# Patient Record
Sex: MALE | Race: WHITE | Employment: OTHER | URBAN - METROPOLITAN AREA
[De-identification: names, ages, dates, MRNs, and addresses within clinical notes are randomized per-mention and may not be internally consistent; named-entity substitution may affect disease eponyms.]

---

## 2017-01-06 ENCOUNTER — HOSPITAL ENCOUNTER (INPATIENT)
Age: 82
LOS: 4 days | Discharge: NURSING, REHAB OR LONG-TERM CARE | DRG: 065 | End: 2017-01-10
Attending: INTERNAL MEDICINE | Admitting: FAMILY MEDICINE
Payer: MEDICARE

## 2017-01-06 ENCOUNTER — TELEPHONE (OUTPATIENT)
Dept: FAMILY MEDICINE CLINIC | Age: 82
End: 2017-01-06

## 2017-01-06 PROCEDURE — 36415 COLL VENOUS BLD VENIPUNCTURE: CPT

## 2017-01-06 PROCEDURE — 2580000003 HC RX 258: Performed by: FAMILY MEDICINE

## 2017-01-06 PROCEDURE — 1210000000 HC MED SURG R&B

## 2017-01-06 PROCEDURE — 84484 ASSAY OF TROPONIN QUANT: CPT

## 2017-01-06 RX ORDER — HYDRALAZINE HYDROCHLORIDE 20 MG/ML
10 INJECTION INTRAMUSCULAR; INTRAVENOUS EVERY 4 HOURS PRN
Status: DISCONTINUED | OUTPATIENT
Start: 2017-01-06 | End: 2017-01-09

## 2017-01-06 RX ORDER — ONDANSETRON 2 MG/ML
4 INJECTION INTRAMUSCULAR; INTRAVENOUS EVERY 6 HOURS PRN
Status: DISCONTINUED | OUTPATIENT
Start: 2017-01-06 | End: 2017-01-10 | Stop reason: HOSPADM

## 2017-01-06 RX ORDER — SODIUM CHLORIDE 9 MG/ML
INJECTION, SOLUTION INTRAVENOUS CONTINUOUS
Status: DISCONTINUED | OUTPATIENT
Start: 2017-01-06 | End: 2017-01-09

## 2017-01-06 RX ORDER — ACETAMINOPHEN 650 MG/1
650 SUPPOSITORY RECTAL EVERY 4 HOURS PRN
Status: DISCONTINUED | OUTPATIENT
Start: 2017-01-06 | End: 2017-01-10 | Stop reason: HOSPADM

## 2017-01-06 RX ORDER — SULFACETAMIDE SODIUM 100 MG/ML
1 SOLUTION/ DROPS OPHTHALMIC
Status: DISCONTINUED | OUTPATIENT
Start: 2017-01-07 | End: 2017-01-10 | Stop reason: HOSPADM

## 2017-01-06 RX ORDER — ACETAMINOPHEN 325 MG/1
650 TABLET ORAL EVERY 4 HOURS PRN
Status: DISCONTINUED | OUTPATIENT
Start: 2017-01-06 | End: 2017-01-10 | Stop reason: HOSPADM

## 2017-01-06 RX ADMIN — SODIUM CHLORIDE: 9 INJECTION, SOLUTION INTRAVENOUS at 23:52

## 2017-01-07 ENCOUNTER — APPOINTMENT (OUTPATIENT)
Dept: ULTRASOUND IMAGING | Age: 82
DRG: 065 | End: 2017-01-07
Attending: INTERNAL MEDICINE
Payer: MEDICARE

## 2017-01-07 ENCOUNTER — APPOINTMENT (OUTPATIENT)
Dept: GENERAL RADIOLOGY | Age: 82
DRG: 065 | End: 2017-01-07
Attending: INTERNAL MEDICINE
Payer: MEDICARE

## 2017-01-07 ENCOUNTER — APPOINTMENT (OUTPATIENT)
Dept: MRI IMAGING | Age: 82
DRG: 065 | End: 2017-01-07
Attending: INTERNAL MEDICINE
Payer: MEDICARE

## 2017-01-07 PROBLEM — I48.91 NEW ONSET A-FIB (HCC): Status: ACTIVE | Noted: 2017-01-07

## 2017-01-07 PROBLEM — I63.9 CVA (CEREBRAL VASCULAR ACCIDENT) (HCC): Status: ACTIVE | Noted: 2017-01-07

## 2017-01-07 LAB
ANION GAP SERPL CALCULATED.3IONS-SCNC: 15 MEQ/L (ref 7–13)
BUN BLDV-MCNC: 24 MG/DL (ref 8–23)
CALCIUM SERPL-MCNC: 8.8 MG/DL (ref 8.6–10.2)
CHLORIDE BLD-SCNC: 102 MEQ/L (ref 98–107)
CO2: 21 MEQ/L (ref 22–29)
CREAT SERPL-MCNC: 0.7 MG/DL (ref 0.7–1.2)
GFR AFRICAN AMERICAN: >60
GFR NON-AFRICAN AMERICAN: >60
GLUCOSE BLD-MCNC: 89 MG/DL (ref 74–109)
HCT VFR BLD CALC: 40.7 % (ref 42–52)
HEMOGLOBIN: 13.4 G/DL (ref 14–18)
LV EF: 60 %
LVEF MODALITY: NORMAL
MCH RBC QN AUTO: 30 PG (ref 27–31.3)
MCHC RBC AUTO-ENTMCNC: 33 % (ref 33–37)
MCV RBC AUTO: 91.1 FL (ref 80–100)
PDW BLD-RTO: 13.8 % (ref 11.5–14.5)
PLATELET # BLD: 254 K/UL (ref 130–400)
POTASSIUM SERPL-SCNC: 3.7 MEQ/L (ref 3.5–5.1)
RBC # BLD: 4.47 M/UL (ref 4.7–6.1)
SODIUM BLD-SCNC: 138 MEQ/L (ref 132–144)
TROPONIN: <0.01 NG/ML (ref 0–0.01)
WBC # BLD: 9.6 K/UL (ref 4.8–10.8)

## 2017-01-07 PROCEDURE — 1210000000 HC MED SURG R&B

## 2017-01-07 PROCEDURE — 6370000000 HC RX 637 (ALT 250 FOR IP): Performed by: FAMILY MEDICINE

## 2017-01-07 PROCEDURE — G8989 SELF CARE D/C STATUS: HCPCS

## 2017-01-07 PROCEDURE — 6360000002 HC RX W HCPCS: Performed by: NURSE PRACTITIONER

## 2017-01-07 PROCEDURE — 36415 COLL VENOUS BLD VENIPUNCTURE: CPT

## 2017-01-07 PROCEDURE — 93880 EXTRACRANIAL BILAT STUDY: CPT

## 2017-01-07 PROCEDURE — 6360000002 HC RX W HCPCS: Performed by: INTERNAL MEDICINE

## 2017-01-07 PROCEDURE — 85027 COMPLETE CBC AUTOMATED: CPT

## 2017-01-07 PROCEDURE — 92523 SPEECH SOUND LANG COMPREHEN: CPT

## 2017-01-07 PROCEDURE — 84484 ASSAY OF TROPONIN QUANT: CPT

## 2017-01-07 PROCEDURE — 93306 TTE W/DOPPLER COMPLETE: CPT

## 2017-01-07 PROCEDURE — 80048 BASIC METABOLIC PNL TOTAL CA: CPT

## 2017-01-07 PROCEDURE — 6370000000 HC RX 637 (ALT 250 FOR IP): Performed by: NURSE PRACTITIONER

## 2017-01-07 PROCEDURE — 92610 EVALUATE SWALLOWING FUNCTION: CPT

## 2017-01-07 PROCEDURE — 99222 1ST HOSP IP/OBS MODERATE 55: CPT | Performed by: INTERNAL MEDICINE

## 2017-01-07 PROCEDURE — 71010 XR CHEST PORTABLE: CPT

## 2017-01-07 PROCEDURE — 70544 MR ANGIOGRAPHY HEAD W/O DYE: CPT

## 2017-01-07 PROCEDURE — G8978 MOBILITY CURRENT STATUS: HCPCS

## 2017-01-07 PROCEDURE — 70551 MRI BRAIN STEM W/O DYE: CPT

## 2017-01-07 PROCEDURE — 97162 PT EVAL MOD COMPLEX 30 MIN: CPT

## 2017-01-07 PROCEDURE — 2580000003 HC RX 258: Performed by: FAMILY MEDICINE

## 2017-01-07 PROCEDURE — G8996 SWALLOW CURRENT STATUS: HCPCS

## 2017-01-07 PROCEDURE — G8979 MOBILITY GOAL STATUS: HCPCS

## 2017-01-07 PROCEDURE — G8997 SWALLOW GOAL STATUS: HCPCS

## 2017-01-07 PROCEDURE — 6360000002 HC RX W HCPCS: Performed by: FAMILY MEDICINE

## 2017-01-07 PROCEDURE — G8987 SELF CARE CURRENT STATUS: HCPCS

## 2017-01-07 PROCEDURE — G8988 SELF CARE GOAL STATUS: HCPCS

## 2017-01-07 PROCEDURE — 97165 OT EVAL LOW COMPLEX 30 MIN: CPT

## 2017-01-07 RX ORDER — METOPROLOL SUCCINATE 50 MG/1
50 TABLET, EXTENDED RELEASE ORAL DAILY
Status: DISCONTINUED | OUTPATIENT
Start: 2017-01-07 | End: 2017-01-07

## 2017-01-07 RX ORDER — ATORVASTATIN CALCIUM 20 MG/1
20 TABLET, FILM COATED ORAL NIGHTLY
Status: DISCONTINUED | OUTPATIENT
Start: 2017-01-07 | End: 2017-01-10 | Stop reason: HOSPADM

## 2017-01-07 RX ORDER — ASPIRIN 81 MG/1
81 TABLET, CHEWABLE ORAL DAILY
Status: DISCONTINUED | OUTPATIENT
Start: 2017-01-07 | End: 2017-01-10 | Stop reason: HOSPADM

## 2017-01-07 RX ORDER — LISINOPRIL 10 MG/1
10 TABLET ORAL DAILY
Status: DISCONTINUED | OUTPATIENT
Start: 2017-01-07 | End: 2017-01-07

## 2017-01-07 RX ORDER — LORAZEPAM 2 MG/ML
0.5 INJECTION INTRAMUSCULAR ONCE
Status: COMPLETED | OUTPATIENT
Start: 2017-01-07 | End: 2017-01-07

## 2017-01-07 RX ORDER — OMEPRAZOLE 20 MG/1
20 CAPSULE, DELAYED RELEASE ORAL DAILY
Status: DISCONTINUED | OUTPATIENT
Start: 2017-01-07 | End: 2017-01-07 | Stop reason: CLARIF

## 2017-01-07 RX ORDER — PANTOPRAZOLE SODIUM 40 MG/1
40 TABLET, DELAYED RELEASE ORAL
Status: DISCONTINUED | OUTPATIENT
Start: 2017-01-08 | End: 2017-01-10 | Stop reason: HOSPADM

## 2017-01-07 RX ADMIN — ENOXAPARIN SODIUM 90 MG: 80 INJECTION, SOLUTION INTRAVENOUS; SUBCUTANEOUS at 20:47

## 2017-01-07 RX ADMIN — HYDRALAZINE HYDROCHLORIDE 10 MG: 20 INJECTION INTRAMUSCULAR; INTRAVENOUS at 00:05

## 2017-01-07 RX ADMIN — SULFACETAMIDE SODIUM 1 DROP: 100 SOLUTION OPHTHALMIC at 20:47

## 2017-01-07 RX ADMIN — LORAZEPAM 0.5 MG: 2 INJECTION INTRAMUSCULAR; INTRAVENOUS at 13:48

## 2017-01-07 RX ADMIN — SULFACETAMIDE SODIUM 1 DROP: 100 SOLUTION OPHTHALMIC at 00:49

## 2017-01-07 RX ADMIN — HYDRALAZINE HYDROCHLORIDE 10 MG: 20 INJECTION INTRAMUSCULAR; INTRAVENOUS at 09:39

## 2017-01-07 RX ADMIN — ASPIRIN 81 MG 81 MG: 81 TABLET ORAL at 13:20

## 2017-01-07 RX ADMIN — SODIUM CHLORIDE: 9 INJECTION, SOLUTION INTRAVENOUS at 13:12

## 2017-01-07 RX ADMIN — SULFACETAMIDE SODIUM 1 DROP: 100 SOLUTION OPHTHALMIC at 07:08

## 2017-01-07 RX ADMIN — ENOXAPARIN SODIUM 40 MG: 40 INJECTION SUBCUTANEOUS at 09:37

## 2017-01-07 RX ADMIN — ATORVASTATIN CALCIUM 20 MG: 20 TABLET, FILM COATED ORAL at 21:14

## 2017-01-07 ASSESSMENT — ENCOUNTER SYMPTOMS
EYES NEGATIVE: 1
CHOKING: 0
CHEST TIGHTNESS: 0
GASTROINTESTINAL NEGATIVE: 1
WHEEZING: 0
VOMITING: 0
ALLERGIC/IMMUNOLOGIC NEGATIVE: 1
COUGH: 0
STRIDOR: 0
APNEA: 0
SHORTNESS OF BREATH: 0
NAUSEA: 0

## 2017-01-07 ASSESSMENT — PAIN SCALES - GENERAL
PAINLEVEL_OUTOF10: 0

## 2017-01-08 ENCOUNTER — APPOINTMENT (OUTPATIENT)
Dept: ULTRASOUND IMAGING | Age: 82
DRG: 065 | End: 2017-01-08
Attending: INTERNAL MEDICINE
Payer: MEDICARE

## 2017-01-08 PROBLEM — R13.12 DYSPHAGIA, OROPHARYNGEAL PHASE: Status: ACTIVE | Noted: 2017-01-08

## 2017-01-08 PROBLEM — R47.01 APHASIA: Status: ACTIVE | Noted: 2017-01-08

## 2017-01-08 PROBLEM — Z78.9 IMPAIRED MOBILITY AND ACTIVITIES OF DAILY LIVING: Status: ACTIVE | Noted: 2017-01-08

## 2017-01-08 PROBLEM — Z74.09 IMPAIRED MOBILITY AND ACTIVITIES OF DAILY LIVING: Status: ACTIVE | Noted: 2017-01-08

## 2017-01-08 PROBLEM — R26.9 GAIT ABNORMALITY: Status: ACTIVE | Noted: 2017-01-08

## 2017-01-08 PROCEDURE — 6370000000 HC RX 637 (ALT 250 FOR IP): Performed by: FAMILY MEDICINE

## 2017-01-08 PROCEDURE — 93005 ELECTROCARDIOGRAM TRACING: CPT

## 2017-01-08 PROCEDURE — 6360000002 HC RX W HCPCS: Performed by: NURSE PRACTITIONER

## 2017-01-08 PROCEDURE — 6370000000 HC RX 637 (ALT 250 FOR IP): Performed by: NURSE PRACTITIONER

## 2017-01-08 PROCEDURE — 6360000002 HC RX W HCPCS: Performed by: PSYCHIATRY & NEUROLOGY

## 2017-01-08 PROCEDURE — 99232 SBSQ HOSP IP/OBS MODERATE 35: CPT | Performed by: NURSE PRACTITIONER

## 2017-01-08 PROCEDURE — 99221 1ST HOSP IP/OBS SF/LOW 40: CPT | Performed by: PHYSICAL MEDICINE & REHABILITATION

## 2017-01-08 PROCEDURE — 93971 EXTREMITY STUDY: CPT

## 2017-01-08 PROCEDURE — 1210000000 HC MED SURG R&B

## 2017-01-08 RX ORDER — TAMSULOSIN HYDROCHLORIDE 0.4 MG/1
0.4 CAPSULE ORAL EVERY EVENING
Status: DISCONTINUED | OUTPATIENT
Start: 2017-01-08 | End: 2017-01-10 | Stop reason: HOSPADM

## 2017-01-08 RX ADMIN — PANTOPRAZOLE SODIUM 40 MG: 40 TABLET, DELAYED RELEASE ORAL at 06:31

## 2017-01-08 RX ADMIN — SULFACETAMIDE SODIUM 1 DROP: 100 SOLUTION OPHTHALMIC at 00:13

## 2017-01-08 RX ADMIN — ASPIRIN 81 MG 81 MG: 81 TABLET ORAL at 08:21

## 2017-01-08 RX ADMIN — SULFACETAMIDE SODIUM 1 DROP: 100 SOLUTION OPHTHALMIC at 04:26

## 2017-01-08 RX ADMIN — TAMSULOSIN HYDROCHLORIDE 0.4 MG: 0.4 CAPSULE ORAL at 00:13

## 2017-01-08 RX ADMIN — SULFACETAMIDE SODIUM 1 DROP: 100 SOLUTION OPHTHALMIC at 20:08

## 2017-01-08 RX ADMIN — ACETAMINOPHEN 650 MG: 325 TABLET ORAL at 20:31

## 2017-01-08 RX ADMIN — SULFACETAMIDE SODIUM 1 DROP: 100 SOLUTION OPHTHALMIC at 12:42

## 2017-01-08 RX ADMIN — ATORVASTATIN CALCIUM 20 MG: 20 TABLET, FILM COATED ORAL at 20:08

## 2017-01-08 RX ADMIN — SULFACETAMIDE SODIUM 1 DROP: 100 SOLUTION OPHTHALMIC at 22:00

## 2017-01-08 RX ADMIN — TAMSULOSIN HYDROCHLORIDE 0.4 MG: 0.4 CAPSULE ORAL at 20:08

## 2017-01-08 RX ADMIN — ENOXAPARIN SODIUM 90 MG: 80 INJECTION SUBCUTANEOUS at 20:08

## 2017-01-08 RX ADMIN — SULFACETAMIDE SODIUM 1 DROP: 100 SOLUTION OPHTHALMIC at 06:32

## 2017-01-08 RX ADMIN — SULFACETAMIDE SODIUM 1 DROP: 100 SOLUTION OPHTHALMIC at 10:00

## 2017-01-08 RX ADMIN — ENOXAPARIN SODIUM 90 MG: 80 INJECTION, SOLUTION INTRAVENOUS; SUBCUTANEOUS at 08:21

## 2017-01-08 RX ADMIN — SULFACETAMIDE SODIUM 1 DROP: 100 SOLUTION OPHTHALMIC at 16:38

## 2017-01-08 ASSESSMENT — ENCOUNTER SYMPTOMS
ABDOMINAL PAIN: 0
NAUSEA: 0
RESPIRATORY NEGATIVE: 1
TROUBLE SWALLOWING: 0
EYES NEGATIVE: 1
SHORTNESS OF BREATH: 0
WHEEZING: 0
COUGH: 0
CHEST TIGHTNESS: 0
FACIAL SWELLING: 0
GASTROINTESTINAL NEGATIVE: 1
BLOOD IN STOOL: 0
ABDOMINAL DISTENTION: 0
CHOKING: 0
VOMITING: 0
COLOR CHANGE: 0
CONSTIPATION: 0
BACK PAIN: 0
VISUAL CHANGE: 0
EYE PAIN: 0
PHOTOPHOBIA: 0
EYE REDNESS: 0
ANAL BLEEDING: 0

## 2017-01-08 ASSESSMENT — PAIN DESCRIPTION - LOCATION: LOCATION: KNEE

## 2017-01-08 ASSESSMENT — PAIN SCALES - GENERAL
PAINLEVEL_OUTOF10: 0
PAINLEVEL_OUTOF10: 0
PAINLEVEL_OUTOF10: 4
PAINLEVEL_OUTOF10: 0

## 2017-01-08 ASSESSMENT — PAIN DESCRIPTION - PAIN TYPE: TYPE: CHRONIC PAIN

## 2017-01-09 ENCOUNTER — APPOINTMENT (OUTPATIENT)
Dept: CARDIAC CATH/INVASIVE PROCEDURES | Age: 82
DRG: 065 | End: 2017-01-09
Attending: INTERNAL MEDICINE
Payer: MEDICARE

## 2017-01-09 PROCEDURE — 93321 DOPPLER ECHO F-UP/LMTD STD: CPT

## 2017-01-09 PROCEDURE — 97530 THERAPEUTIC ACTIVITIES: CPT

## 2017-01-09 PROCEDURE — 6370000000 HC RX 637 (ALT 250 FOR IP): Performed by: FAMILY MEDICINE

## 2017-01-09 PROCEDURE — 93005 ELECTROCARDIOGRAM TRACING: CPT

## 2017-01-09 PROCEDURE — 6370000000 HC RX 637 (ALT 250 FOR IP): Performed by: PSYCHIATRY & NEUROLOGY

## 2017-01-09 PROCEDURE — 1210000000 HC MED SURG R&B

## 2017-01-09 PROCEDURE — 6370000000 HC RX 637 (ALT 250 FOR IP): Performed by: INTERNAL MEDICINE

## 2017-01-09 PROCEDURE — 93312 ECHO TRANSESOPHAGEAL: CPT

## 2017-01-09 PROCEDURE — 2580000003 HC RX 258: Performed by: FAMILY MEDICINE

## 2017-01-09 PROCEDURE — 6360000002 HC RX W HCPCS: Performed by: FAMILY MEDICINE

## 2017-01-09 PROCEDURE — 97535 SELF CARE MNGMENT TRAINING: CPT

## 2017-01-09 PROCEDURE — 6370000000 HC RX 637 (ALT 250 FOR IP): Performed by: NURSE PRACTITIONER

## 2017-01-09 PROCEDURE — 6360000002 HC RX W HCPCS

## 2017-01-09 PROCEDURE — 97532 HC OT DEVELOP COGNITIVE SKILLS 15MIN: CPT

## 2017-01-09 PROCEDURE — 93325 DOPPLER ECHO COLOR FLOW MAPG: CPT

## 2017-01-09 PROCEDURE — 93325 DOPPLER ECHO COLOR FLOW MAPG: CPT | Performed by: INTERNAL MEDICINE

## 2017-01-09 PROCEDURE — 99231 SBSQ HOSP IP/OBS SF/LOW 25: CPT | Performed by: PHYSICAL MEDICINE & REHABILITATION

## 2017-01-09 PROCEDURE — 97116 GAIT TRAINING THERAPY: CPT

## 2017-01-09 RX ORDER — SODIUM CHLORIDE 0.9 % (FLUSH) 0.9 %
10 SYRINGE (ML) INJECTION PRN
Status: DISCONTINUED | OUTPATIENT
Start: 2017-01-09 | End: 2017-01-10 | Stop reason: HOSPADM

## 2017-01-09 RX ORDER — METOPROLOL SUCCINATE 50 MG/1
50 TABLET, EXTENDED RELEASE ORAL DAILY
Status: DISCONTINUED | OUTPATIENT
Start: 2017-01-09 | End: 2017-01-10 | Stop reason: HOSPADM

## 2017-01-09 RX ORDER — BACTERIOSTATIC SODIUM CHLORIDE 0.9 %
VIAL (ML) INJECTION
Status: DISPENSED
Start: 2017-01-09 | End: 2017-01-09

## 2017-01-09 RX ORDER — HYDRALAZINE HYDROCHLORIDE 20 MG/ML
5 INJECTION INTRAMUSCULAR; INTRAVENOUS EVERY 6 HOURS PRN
Status: DISCONTINUED | OUTPATIENT
Start: 2017-01-09 | End: 2017-01-10 | Stop reason: HOSPADM

## 2017-01-09 RX ORDER — AMLODIPINE BESYLATE 5 MG/1
5 TABLET ORAL DAILY
Status: DISCONTINUED | OUTPATIENT
Start: 2017-01-09 | End: 2017-01-10 | Stop reason: HOSPADM

## 2017-01-09 RX ORDER — MIDAZOLAM HYDROCHLORIDE 1 MG/ML
INJECTION INTRAMUSCULAR; INTRAVENOUS
Status: DISPENSED
Start: 2017-01-09 | End: 2017-01-10

## 2017-01-09 RX ORDER — SODIUM CHLORIDE 0.9 % (FLUSH) 0.9 %
10 SYRINGE (ML) INJECTION EVERY 12 HOURS SCHEDULED
Status: DISCONTINUED | OUTPATIENT
Start: 2017-01-09 | End: 2017-01-10 | Stop reason: HOSPADM

## 2017-01-09 RX ORDER — NYSTATIN 100000 U/G
OINTMENT TOPICAL PRN
Status: DISCONTINUED | OUTPATIENT
Start: 2017-01-09 | End: 2017-01-10 | Stop reason: HOSPADM

## 2017-01-09 RX ORDER — SODIUM CHLORIDE 9 MG/ML
INJECTION, SOLUTION INTRAVENOUS CONTINUOUS
Status: DISCONTINUED | OUTPATIENT
Start: 2017-01-09 | End: 2017-01-09

## 2017-01-09 RX ADMIN — ATORVASTATIN CALCIUM 20 MG: 20 TABLET, FILM COATED ORAL at 21:10

## 2017-01-09 RX ADMIN — SULFACETAMIDE SODIUM 1 DROP: 100 SOLUTION OPHTHALMIC at 09:42

## 2017-01-09 RX ADMIN — SULFACETAMIDE SODIUM 1 DROP: 100 SOLUTION OPHTHALMIC at 07:01

## 2017-01-09 RX ADMIN — RIVAROXABAN 20 MG: 20 TABLET, FILM COATED ORAL at 17:13

## 2017-01-09 RX ADMIN — HYDRALAZINE HYDROCHLORIDE 10 MG: 20 INJECTION INTRAMUSCULAR; INTRAVENOUS at 08:23

## 2017-01-09 RX ADMIN — SULFACETAMIDE SODIUM 1 DROP: 100 SOLUTION OPHTHALMIC at 03:21

## 2017-01-09 RX ADMIN — SULFACETAMIDE SODIUM 1 DROP: 100 SOLUTION OPHTHALMIC at 21:13

## 2017-01-09 RX ADMIN — SODIUM CHLORIDE: 9 INJECTION, SOLUTION INTRAVENOUS at 09:39

## 2017-01-09 RX ADMIN — SULFACETAMIDE SODIUM 1 DROP: 100 SOLUTION OPHTHALMIC at 00:48

## 2017-01-09 RX ADMIN — TAMSULOSIN HYDROCHLORIDE 0.4 MG: 0.4 CAPSULE ORAL at 17:12

## 2017-01-09 RX ADMIN — SULFACETAMIDE SODIUM 1 DROP: 100 SOLUTION OPHTHALMIC at 18:25

## 2017-01-09 RX ADMIN — SULFACETAMIDE SODIUM 1 DROP: 100 SOLUTION OPHTHALMIC at 15:17

## 2017-01-09 RX ADMIN — METOPROLOL SUCCINATE 50 MG: 50 TABLET, EXTENDED RELEASE ORAL at 09:40

## 2017-01-09 RX ADMIN — AMLODIPINE BESYLATE 5 MG: 5 TABLET ORAL at 21:11

## 2017-01-09 ASSESSMENT — PAIN DESCRIPTION - DESCRIPTORS: DESCRIPTORS: SORE

## 2017-01-09 ASSESSMENT — PAIN DESCRIPTION - PAIN TYPE: TYPE: ACUTE PAIN

## 2017-01-09 ASSESSMENT — PAIN SCALES - GENERAL
PAINLEVEL_OUTOF10: 0
PAINLEVEL_OUTOF10: 2
PAINLEVEL_OUTOF10: 0

## 2017-01-09 ASSESSMENT — PAIN DESCRIPTION - ORIENTATION: ORIENTATION: LEFT

## 2017-01-09 ASSESSMENT — PAIN DESCRIPTION - LOCATION: LOCATION: KNEE

## 2017-01-10 VITALS
SYSTOLIC BLOOD PRESSURE: 176 MMHG | HEIGHT: 69 IN | BODY MASS INDEX: 30.07 KG/M2 | HEART RATE: 63 BPM | TEMPERATURE: 98.4 F | DIASTOLIC BLOOD PRESSURE: 92 MMHG | WEIGHT: 203 LBS | OXYGEN SATURATION: 95 % | RESPIRATION RATE: 18 BRPM

## 2017-01-10 LAB
ANION GAP SERPL CALCULATED.3IONS-SCNC: 13 MEQ/L (ref 7–13)
BASOPHILS ABSOLUTE: 0.1 K/UL (ref 0–0.2)
BASOPHILS RELATIVE PERCENT: 0.9 %
BUN BLDV-MCNC: 30 MG/DL (ref 8–23)
CALCIUM SERPL-MCNC: 8.8 MG/DL (ref 8.6–10.2)
CHLORIDE BLD-SCNC: 105 MEQ/L (ref 98–107)
CO2: 20 MEQ/L (ref 22–29)
CREAT SERPL-MCNC: 0.59 MG/DL (ref 0.7–1.2)
EOSINOPHILS ABSOLUTE: 0.3 K/UL (ref 0–0.7)
EOSINOPHILS RELATIVE PERCENT: 2.5 %
GFR AFRICAN AMERICAN: >60
GFR NON-AFRICAN AMERICAN: >60
GLUCOSE BLD-MCNC: 115 MG/DL (ref 74–109)
HCT VFR BLD CALC: 39.2 % (ref 42–52)
HEMOGLOBIN: 13.4 G/DL (ref 14–18)
LYMPHOCYTES ABSOLUTE: 1.6 K/UL (ref 1–4.8)
LYMPHOCYTES RELATIVE PERCENT: 14 %
MAGNESIUM: 2.3 MG/DL (ref 1.7–2.3)
MCH RBC QN AUTO: 31 PG (ref 27–31.3)
MCHC RBC AUTO-ENTMCNC: 34.3 % (ref 33–37)
MCV RBC AUTO: 90.3 FL (ref 80–100)
MONOCYTES ABSOLUTE: 1.5 K/UL (ref 0.2–0.8)
MONOCYTES RELATIVE PERCENT: 13.2 %
NEUTROPHILS ABSOLUTE: 7.8 K/UL (ref 1.4–6.5)
NEUTROPHILS RELATIVE PERCENT: 69.4 %
PDW BLD-RTO: 13.8 % (ref 11.5–14.5)
PLATELET # BLD: 205 K/UL (ref 130–400)
POTASSIUM SERPL-SCNC: 4 MEQ/L (ref 3.5–5.1)
RBC # BLD: 4.34 M/UL (ref 4.7–6.1)
SODIUM BLD-SCNC: 138 MEQ/L (ref 132–144)
WBC # BLD: 11.2 K/UL (ref 4.8–10.8)

## 2017-01-10 PROCEDURE — 85025 COMPLETE CBC W/AUTO DIFF WBC: CPT

## 2017-01-10 PROCEDURE — 6370000000 HC RX 637 (ALT 250 FOR IP): Performed by: INTERNAL MEDICINE

## 2017-01-10 PROCEDURE — 97116 GAIT TRAINING THERAPY: CPT

## 2017-01-10 PROCEDURE — 36415 COLL VENOUS BLD VENIPUNCTURE: CPT

## 2017-01-10 PROCEDURE — 93005 ELECTROCARDIOGRAM TRACING: CPT

## 2017-01-10 PROCEDURE — 99232 SBSQ HOSP IP/OBS MODERATE 35: CPT | Performed by: PHYSICAL MEDICINE & REHABILITATION

## 2017-01-10 PROCEDURE — 80048 BASIC METABOLIC PNL TOTAL CA: CPT

## 2017-01-10 PROCEDURE — 6370000000 HC RX 637 (ALT 250 FOR IP): Performed by: NURSE PRACTITIONER

## 2017-01-10 PROCEDURE — 97532 HC COGNITIVE THERAPY 15 MIN: CPT

## 2017-01-10 PROCEDURE — 6370000000 HC RX 637 (ALT 250 FOR IP): Performed by: PSYCHIATRY & NEUROLOGY

## 2017-01-10 PROCEDURE — 99231 SBSQ HOSP IP/OBS SF/LOW 25: CPT | Performed by: NURSE PRACTITIONER

## 2017-01-10 PROCEDURE — 92507 TX SP LANG VOICE COMM INDIV: CPT

## 2017-01-10 PROCEDURE — 2580000003 HC RX 258: Performed by: NURSE PRACTITIONER

## 2017-01-10 PROCEDURE — 83735 ASSAY OF MAGNESIUM: CPT

## 2017-01-10 PROCEDURE — 6370000000 HC RX 637 (ALT 250 FOR IP): Performed by: FAMILY MEDICINE

## 2017-01-10 RX ORDER — ASPIRIN 81 MG/1
81 TABLET, CHEWABLE ORAL DAILY
Qty: 30 TABLET | Refills: 3 | Status: SHIPPED | OUTPATIENT
Start: 2017-01-10

## 2017-01-10 RX ORDER — METOPROLOL SUCCINATE 50 MG/1
50 TABLET, EXTENDED RELEASE ORAL DAILY
Qty: 30 TABLET | Refills: 3 | Status: SHIPPED | OUTPATIENT
Start: 2017-01-10 | End: 2017-02-01 | Stop reason: DRUGHIGH

## 2017-01-10 RX ORDER — AMLODIPINE BESYLATE 5 MG/1
5 TABLET ORAL DAILY
Qty: 30 TABLET | Refills: 3 | Status: SHIPPED | OUTPATIENT
Start: 2017-01-10 | End: 2017-01-30 | Stop reason: SDUPTHER

## 2017-01-10 RX ORDER — PANTOPRAZOLE SODIUM 40 MG/1
40 TABLET, DELAYED RELEASE ORAL
Qty: 30 TABLET | Refills: 3 | Status: SHIPPED | OUTPATIENT
Start: 2017-01-10 | End: 2017-01-12 | Stop reason: ALTCHOICE

## 2017-01-10 RX ORDER — ATORVASTATIN CALCIUM 20 MG/1
20 TABLET, FILM COATED ORAL NIGHTLY
Qty: 30 TABLET | Refills: 3 | Status: SHIPPED | OUTPATIENT
Start: 2017-01-10 | End: 2017-06-02 | Stop reason: SDUPTHER

## 2017-01-10 RX ORDER — GUAIFENESIN/DEXTROMETHORPHAN 100-10MG/5
10 SYRUP ORAL EVERY 4 HOURS PRN
Qty: 120 ML | Refills: 0 | Status: SHIPPED | OUTPATIENT
Start: 2017-01-10 | End: 2017-01-20

## 2017-01-10 RX ORDER — GUAIFENESIN/DEXTROMETHORPHAN 100-10MG/5
10 SYRUP ORAL EVERY 4 HOURS PRN
Status: DISCONTINUED | OUTPATIENT
Start: 2017-01-10 | End: 2017-01-10 | Stop reason: HOSPADM

## 2017-01-10 RX ADMIN — METOPROLOL SUCCINATE 50 MG: 50 TABLET, EXTENDED RELEASE ORAL at 08:50

## 2017-01-10 RX ADMIN — TAMSULOSIN HYDROCHLORIDE 0.4 MG: 0.4 CAPSULE ORAL at 17:13

## 2017-01-10 RX ADMIN — ASPIRIN 81 MG 81 MG: 81 TABLET ORAL at 08:50

## 2017-01-10 RX ADMIN — SULFACETAMIDE SODIUM 1 DROP: 100 SOLUTION OPHTHALMIC at 04:54

## 2017-01-10 RX ADMIN — SODIUM CHLORIDE, PRESERVATIVE FREE 10 ML: 5 INJECTION INTRAVENOUS at 08:50

## 2017-01-10 RX ADMIN — SULFACETAMIDE SODIUM 1 DROP: 100 SOLUTION OPHTHALMIC at 16:33

## 2017-01-10 RX ADMIN — SULFACETAMIDE SODIUM 1 DROP: 100 SOLUTION OPHTHALMIC at 12:54

## 2017-01-10 RX ADMIN — PANTOPRAZOLE SODIUM 40 MG: 40 TABLET, DELAYED RELEASE ORAL at 08:50

## 2017-01-10 RX ADMIN — SULFACETAMIDE SODIUM 1 DROP: 100 SOLUTION OPHTHALMIC at 08:51

## 2017-01-10 RX ADMIN — AMLODIPINE BESYLATE 5 MG: 5 TABLET ORAL at 08:50

## 2017-01-10 RX ADMIN — SULFACETAMIDE SODIUM 1 DROP: 100 SOLUTION OPHTHALMIC at 07:02

## 2017-01-10 RX ADMIN — RIVAROXABAN 20 MG: 20 TABLET, FILM COATED ORAL at 17:13

## 2017-01-10 RX ADMIN — SULFACETAMIDE SODIUM 1 DROP: 100 SOLUTION OPHTHALMIC at 01:35

## 2017-01-10 ASSESSMENT — ENCOUNTER SYMPTOMS
EYES NEGATIVE: 1
GASTROINTESTINAL NEGATIVE: 1
RESPIRATORY NEGATIVE: 1

## 2017-01-10 ASSESSMENT — PAIN SCALES - GENERAL
PAINLEVEL_OUTOF10: 0
PAINLEVEL_OUTOF10: 0

## 2017-01-11 ENCOUNTER — NURSE ONLY (OUTPATIENT)
Dept: GERIATRIC MEDICINE | Age: 82
End: 2017-01-11

## 2017-01-11 DIAGNOSIS — I63.9 CEREBROVASCULAR ACCIDENT (CVA), UNSPECIFIED MECHANISM (HCC): Primary | ICD-10-CM

## 2017-01-11 DIAGNOSIS — R53.1 WEAKNESS: ICD-10-CM

## 2017-01-11 DIAGNOSIS — I10 ESSENTIAL HYPERTENSION: ICD-10-CM

## 2017-01-11 PROCEDURE — 99305 1ST NF CARE MODERATE MDM 35: CPT | Performed by: INTERNAL MEDICINE

## 2017-01-12 LAB
EKG ATRIAL RATE: 57 BPM
EKG ATRIAL RATE: 63 BPM
EKG ATRIAL RATE: 67 BPM
EKG ATRIAL RATE: 68 BPM
EKG P AXIS: 34 DEGREES
EKG P AXIS: 35 DEGREES
EKG P AXIS: 50 DEGREES
EKG P AXIS: 61 DEGREES
EKG P-R INTERVAL: 156 MS
EKG P-R INTERVAL: 162 MS
EKG P-R INTERVAL: 170 MS
EKG P-R INTERVAL: 182 MS
EKG Q-T INTERVAL: 424 MS
EKG Q-T INTERVAL: 430 MS
EKG Q-T INTERVAL: 446 MS
EKG Q-T INTERVAL: 458 MS
EKG QRS DURATION: 108 MS
EKG QRS DURATION: 110 MS
EKG QRS DURATION: 110 MS
EKG QRS DURATION: 112 MS
EKG QTC CALCULATION (BAZETT): 445 MS
EKG QTC CALCULATION (BAZETT): 450 MS
EKG QTC CALCULATION (BAZETT): 454 MS
EKG QTC CALCULATION (BAZETT): 456 MS
EKG R AXIS: 16 DEGREES
EKG R AXIS: 17 DEGREES
EKG R AXIS: 35 DEGREES
EKG R AXIS: 42 DEGREES
EKG T AXIS: 48 DEGREES
EKG T AXIS: 52 DEGREES
EKG T AXIS: 55 DEGREES
EKG T AXIS: 59 DEGREES
EKG VENTRICULAR RATE: 57 BPM
EKG VENTRICULAR RATE: 63 BPM
EKG VENTRICULAR RATE: 67 BPM
EKG VENTRICULAR RATE: 68 BPM

## 2017-01-12 RX ORDER — OMEPRAZOLE 20 MG/1
20 CAPSULE, DELAYED RELEASE ORAL DAILY
COMMUNITY
End: 2017-08-04 | Stop reason: ALTCHOICE

## 2017-01-12 RX ORDER — FINASTERIDE 5 MG/1
5 TABLET, FILM COATED ORAL DAILY
COMMUNITY
End: 2018-01-05 | Stop reason: SDUPTHER

## 2017-01-12 RX ORDER — LACTOBACILLUS RHAMNOSUS GG 10B CELL
1 CAPSULE ORAL DAILY
COMMUNITY
Start: 2017-01-11 | End: 2017-01-18

## 2017-01-17 ENCOUNTER — NURSE ONLY (OUTPATIENT)
Dept: GERIATRIC MEDICINE | Age: 82
End: 2017-01-17

## 2017-01-17 DIAGNOSIS — I63.9 CEREBROVASCULAR ACCIDENT (CVA), UNSPECIFIED MECHANISM (HCC): Primary | ICD-10-CM

## 2017-01-17 DIAGNOSIS — R13.12 DYSPHAGIA, OROPHARYNGEAL PHASE: ICD-10-CM

## 2017-01-17 DIAGNOSIS — I10 ESSENTIAL HYPERTENSION: ICD-10-CM

## 2017-01-17 DIAGNOSIS — G45.9 TRANSIENT CEREBRAL ISCHEMIA, UNSPECIFIED TYPE: ICD-10-CM

## 2017-01-17 PROCEDURE — 99308 SBSQ NF CARE LOW MDM 20: CPT | Performed by: NURSE PRACTITIONER

## 2017-01-18 LAB
BASOPHILS ABSOLUTE: ABNORMAL /ΜL
BASOPHILS RELATIVE PERCENT: ABNORMAL %
BUN BLDV-MCNC: 17 MG/DL
CALCIUM SERPL-MCNC: 8.5 MG/DL
CHLORIDE BLD-SCNC: 102 MMOL/L
CHOLESTEROL, TOTAL: 78 MG/DL
CHOLESTEROL/HDL RATIO: 2.6
CO2: 23 MMOL/L
CREAT SERPL-MCNC: 0.63 MG/DL
EOSINOPHILS ABSOLUTE: ABNORMAL /ΜL
EOSINOPHILS RELATIVE PERCENT: ABNORMAL %
GFR CALCULATED: >60
GLUCOSE BLD-MCNC: 92 MG/DL
HCT VFR BLD CALC: 35.8 % (ref 41–53)
HDLC SERPL-MCNC: 30 MG/DL (ref 35–70)
HEMOGLOBIN: 11.6 G/DL (ref 13.5–17.5)
LDL CHOLESTEROL CALCULATED: 41 MG/DL (ref 0–160)
LYMPHOCYTES ABSOLUTE: ABNORMAL /ΜL
LYMPHOCYTES RELATIVE PERCENT: ABNORMAL %
MCH RBC QN AUTO: 29.4 PG
MCHC RBC AUTO-ENTMCNC: 32.4 G/DL
MCV RBC AUTO: 90.6 FL
MONOCYTES ABSOLUTE: ABNORMAL /ΜL
MONOCYTES RELATIVE PERCENT: ABNORMAL %
NEUTROPHILS ABSOLUTE: ABNORMAL /ΜL
NEUTROPHILS RELATIVE PERCENT: ABNORMAL %
PLATELET # BLD: 174 K/ΜL
PMV BLD AUTO: ABNORMAL FL
POTASSIUM SERPL-SCNC: 4 MMOL/L
RBC # BLD: 3.95 10^6/ΜL
SODIUM BLD-SCNC: 139 MMOL/L
TRIGL SERPL-MCNC: 36 MG/DL
VLDLC SERPL CALC-MCNC: 7 MG/DL
WBC # BLD: 9.69 10^3/ML

## 2017-01-19 ENCOUNTER — NURSE ONLY (OUTPATIENT)
Dept: GERIATRIC MEDICINE | Age: 82
End: 2017-01-19

## 2017-01-19 VITALS
DIASTOLIC BLOOD PRESSURE: 72 MMHG | OXYGEN SATURATION: 98 % | TEMPERATURE: 96.8 F | SYSTOLIC BLOOD PRESSURE: 114 MMHG | HEART RATE: 86 BPM

## 2017-01-19 DIAGNOSIS — I10 ESSENTIAL HYPERTENSION: ICD-10-CM

## 2017-01-19 DIAGNOSIS — G45.9 TRANSIENT CEREBRAL ISCHEMIA, UNSPECIFIED TYPE: Primary | ICD-10-CM

## 2017-01-19 DIAGNOSIS — D50.9 IRON DEFICIENCY ANEMIA, UNSPECIFIED IRON DEFICIENCY ANEMIA TYPE: ICD-10-CM

## 2017-01-19 DIAGNOSIS — I63.9 CEREBROVASCULAR ACCIDENT (CVA), UNSPECIFIED MECHANISM (HCC): ICD-10-CM

## 2017-01-19 PROCEDURE — 99316 NF DSCHRG MGMT 30 MIN+: CPT | Performed by: NURSE PRACTITIONER

## 2017-01-20 ENCOUNTER — TELEPHONE (OUTPATIENT)
Dept: FAMILY MEDICINE CLINIC | Age: 82
End: 2017-01-20

## 2017-01-20 DIAGNOSIS — I63.529 CEREBROVASCULAR ACCIDENT (CVA) DUE TO OCCLUSION OF ANTERIOR CEREBRAL ARTERY, UNSPECIFIED BLOOD VESSEL LATERALITY (HCC): Primary | ICD-10-CM

## 2017-01-24 ENCOUNTER — TELEPHONE (OUTPATIENT)
Dept: FAMILY MEDICINE CLINIC | Age: 82
End: 2017-01-24

## 2017-01-25 ENCOUNTER — OFFICE VISIT (OUTPATIENT)
Dept: FAMILY MEDICINE CLINIC | Age: 82
End: 2017-01-25

## 2017-01-25 VITALS
DIASTOLIC BLOOD PRESSURE: 90 MMHG | BODY MASS INDEX: 28.66 KG/M2 | HEART RATE: 52 BPM | HEIGHT: 70 IN | WEIGHT: 200.2 LBS | TEMPERATURE: 97.4 F | SYSTOLIC BLOOD PRESSURE: 134 MMHG | RESPIRATION RATE: 12 BRPM

## 2017-01-25 VITALS
RESPIRATION RATE: 18 BRPM | TEMPERATURE: 98.2 F | HEART RATE: 77 BPM | OXYGEN SATURATION: 97 % | DIASTOLIC BLOOD PRESSURE: 78 MMHG | SYSTOLIC BLOOD PRESSURE: 148 MMHG

## 2017-01-25 VITALS
HEART RATE: 57 BPM | OXYGEN SATURATION: 97 % | BODY MASS INDEX: 27.92 KG/M2 | RESPIRATION RATE: 18 BRPM | HEIGHT: 70 IN | TEMPERATURE: 96.1 F | WEIGHT: 195 LBS | DIASTOLIC BLOOD PRESSURE: 78 MMHG | SYSTOLIC BLOOD PRESSURE: 132 MMHG

## 2017-01-25 DIAGNOSIS — I48.91 NEW ONSET A-FIB (HCC): ICD-10-CM

## 2017-01-25 DIAGNOSIS — I63.9 CEREBROVASCULAR ACCIDENT (CVA), UNSPECIFIED MECHANISM (HCC): Primary | ICD-10-CM

## 2017-01-25 DIAGNOSIS — H53.9 VISION CHANGES: ICD-10-CM

## 2017-01-25 DIAGNOSIS — I10 ESSENTIAL HYPERTENSION: ICD-10-CM

## 2017-01-25 DIAGNOSIS — F41.9 ANXIETY: ICD-10-CM

## 2017-01-25 PROCEDURE — 99213 OFFICE O/P EST LOW 20 MIN: CPT | Performed by: FAMILY MEDICINE

## 2017-01-25 RX ORDER — DICLOFENAC SODIUM 75 MG/1
75 TABLET, DELAYED RELEASE ORAL 2 TIMES DAILY
COMMUNITY
End: 2017-09-21 | Stop reason: SDUPTHER

## 2017-01-25 RX ORDER — METOPROLOL SUCCINATE 100 MG/1
100 TABLET, EXTENDED RELEASE ORAL DAILY
Qty: 30 TABLET | Refills: 3 | Status: SHIPPED | OUTPATIENT
Start: 2017-01-25 | End: 2017-02-01 | Stop reason: DRUGHIGH

## 2017-01-25 ASSESSMENT — ENCOUNTER SYMPTOMS
COUGH: 0
EYES NEGATIVE: 1
NAUSEA: 0
ABDOMINAL PAIN: 0
SHORTNESS OF BREATH: 0
CONSTIPATION: 0
DIARRHEA: 0

## 2017-01-27 ENCOUNTER — TELEPHONE (OUTPATIENT)
Dept: FAMILY MEDICINE CLINIC | Age: 82
End: 2017-01-27

## 2017-01-30 PROBLEM — G45.9 TIA (TRANSIENT ISCHEMIC ATTACK): Status: ACTIVE | Noted: 2017-01-17

## 2017-01-30 PROBLEM — D64.9 ANEMIA: Status: ACTIVE | Noted: 2017-01-19

## 2017-01-30 RX ORDER — AMLODIPINE BESYLATE 5 MG/1
TABLET ORAL
Qty: 60 TABLET | Refills: 3 | Status: SHIPPED | OUTPATIENT
Start: 2017-01-30 | End: 2017-03-30 | Stop reason: SDUPTHER

## 2017-01-30 RX ORDER — AMLODIPINE BESYLATE 5 MG/1
TABLET ORAL
Qty: 60 TABLET | Refills: 3 | Status: CANCELLED | OUTPATIENT
Start: 2017-01-30

## 2017-02-01 ENCOUNTER — OFFICE VISIT (OUTPATIENT)
Dept: CARDIOLOGY | Age: 82
End: 2017-02-01

## 2017-02-01 VITALS
DIASTOLIC BLOOD PRESSURE: 70 MMHG | WEIGHT: 196.2 LBS | OXYGEN SATURATION: 98 % | BODY MASS INDEX: 28.09 KG/M2 | HEIGHT: 70 IN | HEART RATE: 53 BPM | SYSTOLIC BLOOD PRESSURE: 130 MMHG

## 2017-02-01 DIAGNOSIS — I65.23 BILATERAL CAROTID ARTERY STENOSIS: ICD-10-CM

## 2017-02-01 DIAGNOSIS — I48.0 PAROXYSMAL ATRIAL FIBRILLATION (HCC): ICD-10-CM

## 2017-02-01 DIAGNOSIS — I63.019 CEREBROVASCULAR ACCIDENT (CVA) DUE TO THROMBOSIS OF VERTEBRAL ARTERY, UNSPECIFIED BLOOD VESSEL LATERALITY (HCC): ICD-10-CM

## 2017-02-01 DIAGNOSIS — I10 ESSENTIAL HYPERTENSION: ICD-10-CM

## 2017-02-01 PROCEDURE — 99214 OFFICE O/P EST MOD 30 MIN: CPT | Performed by: PHYSICIAN ASSISTANT

## 2017-02-01 RX ORDER — METOPROLOL SUCCINATE 25 MG/1
25 TABLET, EXTENDED RELEASE ORAL DAILY
Qty: 30 TABLET | Refills: 3 | Status: SHIPPED | OUTPATIENT
Start: 2017-02-01 | End: 2017-03-02 | Stop reason: SDUPTHER

## 2017-02-01 ASSESSMENT — ENCOUNTER SYMPTOMS
CHEST TIGHTNESS: 0
VOMITING: 0
NAUSEA: 0
BLOOD IN STOOL: 0
SHORTNESS OF BREATH: 0
COLOR CHANGE: 0

## 2017-02-07 ENCOUNTER — TELEPHONE (OUTPATIENT)
Dept: FAMILY MEDICINE CLINIC | Age: 82
End: 2017-02-07

## 2017-02-14 ENCOUNTER — NURSE ONLY (OUTPATIENT)
Dept: CARDIOLOGY | Age: 82
End: 2017-02-14

## 2017-02-14 DIAGNOSIS — I48.91 NEW ONSET A-FIB (HCC): Primary | ICD-10-CM

## 2017-02-16 ENCOUNTER — NURSE ONLY (OUTPATIENT)
Dept: CARDIOLOGY | Age: 82
End: 2017-02-16

## 2017-02-16 DIAGNOSIS — I48.91 NEW ONSET A-FIB (HCC): Primary | ICD-10-CM

## 2017-02-22 ENCOUNTER — OFFICE VISIT (OUTPATIENT)
Dept: FAMILY MEDICINE CLINIC | Age: 82
End: 2017-02-22

## 2017-02-22 VITALS
TEMPERATURE: 97.5 F | WEIGHT: 208.2 LBS | RESPIRATION RATE: 16 BRPM | BODY MASS INDEX: 29.81 KG/M2 | HEIGHT: 70 IN | SYSTOLIC BLOOD PRESSURE: 138 MMHG | HEART RATE: 64 BPM | DIASTOLIC BLOOD PRESSURE: 86 MMHG

## 2017-02-22 DIAGNOSIS — J01.00 ACUTE MAXILLARY SINUSITIS, RECURRENCE NOT SPECIFIED: ICD-10-CM

## 2017-02-22 DIAGNOSIS — L30.9 ECZEMA, UNSPECIFIED TYPE: ICD-10-CM

## 2017-02-22 DIAGNOSIS — I63.9 CEREBROVASCULAR ACCIDENT (CVA), UNSPECIFIED MECHANISM (HCC): Primary | ICD-10-CM

## 2017-02-22 DIAGNOSIS — I48.91 NEW ONSET A-FIB (HCC): ICD-10-CM

## 2017-02-22 DIAGNOSIS — I10 ESSENTIAL HYPERTENSION: ICD-10-CM

## 2017-02-22 PROCEDURE — 99213 OFFICE O/P EST LOW 20 MIN: CPT | Performed by: FAMILY MEDICINE

## 2017-02-22 RX ORDER — CEFUROXIME AXETIL 250 MG/1
250 TABLET ORAL 2 TIMES DAILY
Qty: 20 TABLET | Refills: 0 | Status: SHIPPED | OUTPATIENT
Start: 2017-02-22 | End: 2017-03-04

## 2017-02-22 ASSESSMENT — ENCOUNTER SYMPTOMS
CONSTIPATION: 0
SHORTNESS OF BREATH: 0
COUGH: 0
EYES NEGATIVE: 1
DIARRHEA: 0
NAUSEA: 0
ABDOMINAL PAIN: 0

## 2017-02-24 ENCOUNTER — CARE COORDINATION (OUTPATIENT)
Dept: FAMILY MEDICINE CLINIC | Age: 82
End: 2017-02-24

## 2017-02-24 DIAGNOSIS — I48.91 NEW ONSET A-FIB (HCC): Primary | ICD-10-CM

## 2017-03-02 ENCOUNTER — OFFICE VISIT (OUTPATIENT)
Dept: CARDIOLOGY | Age: 82
End: 2017-03-02

## 2017-03-02 VITALS
SYSTOLIC BLOOD PRESSURE: 122 MMHG | HEART RATE: 55 BPM | DIASTOLIC BLOOD PRESSURE: 72 MMHG | WEIGHT: 209.8 LBS | HEIGHT: 70 IN | BODY MASS INDEX: 30.03 KG/M2

## 2017-03-02 DIAGNOSIS — I10 ESSENTIAL HYPERTENSION: ICD-10-CM

## 2017-03-02 DIAGNOSIS — I48.91 NEW ONSET A-FIB (HCC): Primary | ICD-10-CM

## 2017-03-02 PROCEDURE — 93000 ELECTROCARDIOGRAM COMPLETE: CPT | Performed by: INTERNAL MEDICINE

## 2017-03-02 PROCEDURE — 99213 OFFICE O/P EST LOW 20 MIN: CPT | Performed by: INTERNAL MEDICINE

## 2017-03-02 RX ORDER — METOPROLOL SUCCINATE 50 MG/1
50 TABLET, EXTENDED RELEASE ORAL DAILY
Qty: 30 TABLET | Refills: 11 | Status: SHIPPED | OUTPATIENT
Start: 2017-03-02 | End: 2017-05-24 | Stop reason: ALTCHOICE

## 2017-03-02 ASSESSMENT — ENCOUNTER SYMPTOMS
CHEST TIGHTNESS: 0
COLOR CHANGE: 0
SHORTNESS OF BREATH: 0
BLOOD IN STOOL: 0
VOMITING: 0
NAUSEA: 0

## 2017-03-06 ENCOUNTER — CARE COORDINATION (OUTPATIENT)
Dept: FAMILY MEDICINE CLINIC | Age: 82
End: 2017-03-06

## 2017-03-20 RX ORDER — TAMSULOSIN HYDROCHLORIDE 0.4 MG/1
CAPSULE ORAL
Qty: 90 CAPSULE | Refills: 2 | Status: SHIPPED | OUTPATIENT
Start: 2017-03-20 | End: 2018-01-05 | Stop reason: ALTCHOICE

## 2017-03-30 ENCOUNTER — TELEPHONE (OUTPATIENT)
Dept: INTERNAL MEDICINE | Age: 82
End: 2017-03-30

## 2017-03-30 ENCOUNTER — OFFICE VISIT (OUTPATIENT)
Dept: CARDIOLOGY | Age: 82
End: 2017-03-30

## 2017-03-30 VITALS
WEIGHT: 212.2 LBS | DIASTOLIC BLOOD PRESSURE: 72 MMHG | HEIGHT: 70 IN | HEART RATE: 50 BPM | OXYGEN SATURATION: 97 % | BODY MASS INDEX: 30.38 KG/M2 | SYSTOLIC BLOOD PRESSURE: 148 MMHG | RESPIRATION RATE: 12 BRPM

## 2017-03-30 DIAGNOSIS — I77.9 BILATERAL CAROTID ARTERY DISEASE (HCC): ICD-10-CM

## 2017-03-30 DIAGNOSIS — Z86.79 HISTORY OF ATRIAL FIBRILLATION: ICD-10-CM

## 2017-03-30 DIAGNOSIS — I10 ESSENTIAL HYPERTENSION: Primary | ICD-10-CM

## 2017-03-30 PROBLEM — I48.0 AF (PAROXYSMAL ATRIAL FIBRILLATION) (HCC): Status: ACTIVE | Noted: 2017-01-07

## 2017-03-30 PROBLEM — I48.91 NEW ONSET A-FIB (HCC): Status: RESOLVED | Noted: 2017-01-07 | Resolved: 2017-03-30

## 2017-03-30 PROCEDURE — 99213 OFFICE O/P EST LOW 20 MIN: CPT | Performed by: INTERNAL MEDICINE

## 2017-03-30 RX ORDER — AMLODIPINE BESYLATE 10 MG/1
TABLET ORAL
Qty: 30 TABLET | Refills: 6 | Status: SHIPPED | OUTPATIENT
Start: 2017-03-30 | End: 2017-05-24 | Stop reason: ALTCHOICE

## 2017-03-30 RX ORDER — PANTOPRAZOLE SODIUM 40 MG/1
40 TABLET, DELAYED RELEASE ORAL DAILY
COMMUNITY
Start: 2017-03-13 | End: 2017-06-02 | Stop reason: SDUPTHER

## 2017-04-05 ENCOUNTER — CARE COORDINATION (OUTPATIENT)
Dept: FAMILY MEDICINE CLINIC | Age: 82
End: 2017-04-05

## 2017-04-21 ENCOUNTER — OFFICE VISIT (OUTPATIENT)
Dept: FAMILY MEDICINE CLINIC | Age: 82
End: 2017-04-21

## 2017-04-21 VITALS
WEIGHT: 213.6 LBS | RESPIRATION RATE: 10 BRPM | HEIGHT: 70 IN | DIASTOLIC BLOOD PRESSURE: 72 MMHG | SYSTOLIC BLOOD PRESSURE: 120 MMHG | TEMPERATURE: 98.5 F | BODY MASS INDEX: 30.58 KG/M2 | HEART RATE: 72 BPM

## 2017-04-21 DIAGNOSIS — R60.9 EDEMA, UNSPECIFIED TYPE: ICD-10-CM

## 2017-04-21 DIAGNOSIS — I10 ESSENTIAL HYPERTENSION: ICD-10-CM

## 2017-04-21 DIAGNOSIS — I48.0 AF (PAROXYSMAL ATRIAL FIBRILLATION) (HCC): ICD-10-CM

## 2017-04-21 DIAGNOSIS — I63.9 CEREBROVASCULAR ACCIDENT (CVA), UNSPECIFIED MECHANISM (HCC): ICD-10-CM

## 2017-04-21 DIAGNOSIS — Z86.79 HISTORY OF ATRIAL FIBRILLATION: Primary | ICD-10-CM

## 2017-04-21 PROCEDURE — 99213 OFFICE O/P EST LOW 20 MIN: CPT | Performed by: FAMILY MEDICINE

## 2017-04-21 RX ORDER — HYDROCHLOROTHIAZIDE 12.5 MG/1
12.5 CAPSULE, GELATIN COATED ORAL DAILY
Qty: 30 CAPSULE | Refills: 3 | Status: SHIPPED | OUTPATIENT
Start: 2017-04-21 | End: 2018-04-06 | Stop reason: ALTCHOICE

## 2017-04-21 RX ORDER — AMLODIPINE BESYLATE 5 MG/1
5 TABLET ORAL DAILY
Qty: 30 TABLET | Refills: 3 | Status: SHIPPED | OUTPATIENT
Start: 2017-04-21 | End: 2017-07-13 | Stop reason: DRUGHIGH

## 2017-04-21 ASSESSMENT — ENCOUNTER SYMPTOMS
CONSTIPATION: 0
SHORTNESS OF BREATH: 0
ABDOMINAL PAIN: 0
DIARRHEA: 0
EYES NEGATIVE: 1
COUGH: 0
NAUSEA: 0

## 2017-05-05 ENCOUNTER — NURSE ONLY (OUTPATIENT)
Dept: FAMILY MEDICINE CLINIC | Age: 82
End: 2017-05-05

## 2017-05-05 VITALS — DIASTOLIC BLOOD PRESSURE: 80 MMHG | SYSTOLIC BLOOD PRESSURE: 138 MMHG

## 2017-05-05 DIAGNOSIS — I15.9 SECONDARY HYPERTENSION: Primary | ICD-10-CM

## 2017-05-24 ENCOUNTER — OFFICE VISIT (OUTPATIENT)
Dept: FAMILY MEDICINE CLINIC | Age: 82
End: 2017-05-24

## 2017-05-24 ENCOUNTER — CARE COORDINATOR VISIT (OUTPATIENT)
Dept: FAMILY MEDICINE CLINIC | Age: 82
End: 2017-05-24

## 2017-05-24 VITALS
HEIGHT: 70 IN | WEIGHT: 210 LBS | BODY MASS INDEX: 30.06 KG/M2 | DIASTOLIC BLOOD PRESSURE: 84 MMHG | RESPIRATION RATE: 14 BRPM | HEART RATE: 60 BPM | SYSTOLIC BLOOD PRESSURE: 142 MMHG

## 2017-05-24 DIAGNOSIS — G45.9 TRANSIENT CEREBRAL ISCHEMIA, UNSPECIFIED TYPE: ICD-10-CM

## 2017-05-24 DIAGNOSIS — T50.905A MEDICATION SIDE EFFECT, INITIAL ENCOUNTER: ICD-10-CM

## 2017-05-24 DIAGNOSIS — I10 ESSENTIAL HYPERTENSION: Primary | ICD-10-CM

## 2017-05-24 DIAGNOSIS — Z86.79 HISTORY OF ATRIAL FIBRILLATION: ICD-10-CM

## 2017-05-24 PROCEDURE — 99213 OFFICE O/P EST LOW 20 MIN: CPT | Performed by: FAMILY MEDICINE

## 2017-05-24 RX ORDER — METOPROLOL SUCCINATE 25 MG/1
25 TABLET, EXTENDED RELEASE ORAL DAILY
COMMUNITY
End: 2017-08-04 | Stop reason: SDUPTHER

## 2017-05-24 RX ORDER — AMLODIPINE BESYLATE 2.5 MG/1
2.5 TABLET ORAL DAILY
Qty: 30 TABLET | Refills: 3 | Status: SHIPPED | OUTPATIENT
Start: 2017-05-24 | End: 2017-08-04 | Stop reason: SDUPTHER

## 2017-05-24 ASSESSMENT — ENCOUNTER SYMPTOMS
SHORTNESS OF BREATH: 0
NAUSEA: 0
ABDOMINAL PAIN: 0
EYES NEGATIVE: 1
COUGH: 0
CONSTIPATION: 0
DIARRHEA: 0

## 2017-05-24 ASSESSMENT — PATIENT HEALTH QUESTIONNAIRE - PHQ9: SUM OF ALL RESPONSES TO PHQ QUESTIONS 1-9: 0

## 2017-06-02 RX ORDER — ATORVASTATIN CALCIUM 20 MG/1
20 TABLET, FILM COATED ORAL NIGHTLY
Qty: 30 TABLET | Refills: 3 | Status: SHIPPED | OUTPATIENT
Start: 2017-06-02 | End: 2017-10-12 | Stop reason: SDUPTHER

## 2017-06-02 RX ORDER — PANTOPRAZOLE SODIUM 40 MG/1
40 TABLET, DELAYED RELEASE ORAL DAILY
Qty: 30 TABLET | Refills: 3 | Status: SHIPPED | OUTPATIENT
Start: 2017-06-02 | End: 2018-01-05 | Stop reason: ALTCHOICE

## 2017-06-22 ENCOUNTER — OFFICE VISIT (OUTPATIENT)
Dept: FAMILY MEDICINE CLINIC | Age: 82
End: 2017-06-22

## 2017-06-22 VITALS
WEIGHT: 205.5 LBS | RESPIRATION RATE: 8 BRPM | SYSTOLIC BLOOD PRESSURE: 130 MMHG | TEMPERATURE: 97.5 F | BODY MASS INDEX: 29.42 KG/M2 | DIASTOLIC BLOOD PRESSURE: 84 MMHG | HEIGHT: 70 IN | HEART RATE: 62 BPM

## 2017-06-22 DIAGNOSIS — J01.01 ACUTE RECURRENT MAXILLARY SINUSITIS: Primary | ICD-10-CM

## 2017-06-22 DIAGNOSIS — R06.02 SOB (SHORTNESS OF BREATH): ICD-10-CM

## 2017-06-22 DIAGNOSIS — R05.9 COUGH: ICD-10-CM

## 2017-06-22 PROCEDURE — 99213 OFFICE O/P EST LOW 20 MIN: CPT | Performed by: FAMILY MEDICINE

## 2017-06-22 RX ORDER — AZITHROMYCIN 250 MG/1
TABLET, FILM COATED ORAL
Qty: 1 PACKET | Refills: 0 | Status: SHIPPED | OUTPATIENT
Start: 2017-06-22 | End: 2017-07-02

## 2017-07-13 ENCOUNTER — CARE COORDINATION (OUTPATIENT)
Dept: FAMILY MEDICINE CLINIC | Age: 82
End: 2017-07-13

## 2017-08-04 ENCOUNTER — OFFICE VISIT (OUTPATIENT)
Dept: FAMILY MEDICINE CLINIC | Age: 82
End: 2017-08-04

## 2017-08-04 VITALS
WEIGHT: 206 LBS | SYSTOLIC BLOOD PRESSURE: 136 MMHG | HEART RATE: 60 BPM | RESPIRATION RATE: 16 BRPM | DIASTOLIC BLOOD PRESSURE: 86 MMHG | BODY MASS INDEX: 29.49 KG/M2 | HEIGHT: 70 IN

## 2017-08-04 DIAGNOSIS — R26.9 GAIT ABNORMALITY: ICD-10-CM

## 2017-08-04 DIAGNOSIS — Z86.79 HISTORY OF ATRIAL FIBRILLATION: ICD-10-CM

## 2017-08-04 DIAGNOSIS — I10 ESSENTIAL HYPERTENSION: ICD-10-CM

## 2017-08-04 DIAGNOSIS — I77.9 BILATERAL CAROTID ARTERY DISEASE (HCC): Primary | ICD-10-CM

## 2017-08-04 PROCEDURE — 99213 OFFICE O/P EST LOW 20 MIN: CPT | Performed by: FAMILY MEDICINE

## 2017-08-04 RX ORDER — METOPROLOL SUCCINATE 25 MG/1
25 TABLET, EXTENDED RELEASE ORAL DAILY
Qty: 90 TABLET | Refills: 3 | Status: SHIPPED | OUTPATIENT
Start: 2017-08-04 | End: 2017-11-03 | Stop reason: SDUPTHER

## 2017-08-04 RX ORDER — AMLODIPINE BESYLATE 2.5 MG/1
2.5 TABLET ORAL DAILY
Qty: 90 TABLET | Refills: 3 | Status: SHIPPED | OUTPATIENT
Start: 2017-08-04 | End: 2017-11-03 | Stop reason: SDUPTHER

## 2017-08-04 ASSESSMENT — ENCOUNTER SYMPTOMS
CONSTIPATION: 0
NAUSEA: 0
SHORTNESS OF BREATH: 0
ABDOMINAL PAIN: 0
EYES NEGATIVE: 1
DIARRHEA: 0
COUGH: 0

## 2017-08-24 ENCOUNTER — CARE COORDINATION (OUTPATIENT)
Dept: FAMILY MEDICINE CLINIC | Age: 82
End: 2017-08-24

## 2017-08-25 ENCOUNTER — CARE COORDINATION (OUTPATIENT)
Dept: FAMILY MEDICINE CLINIC | Age: 82
End: 2017-08-25

## 2017-09-21 RX ORDER — DICLOFENAC SODIUM 75 MG/1
TABLET, DELAYED RELEASE ORAL
Qty: 180 TABLET | Refills: 1 | Status: SHIPPED | OUTPATIENT
Start: 2017-09-21 | End: 2018-01-05 | Stop reason: SDUPTHER

## 2017-10-13 RX ORDER — ATORVASTATIN CALCIUM 20 MG/1
TABLET, FILM COATED ORAL
Qty: 30 TABLET | Refills: 3 | Status: SHIPPED | OUTPATIENT
Start: 2017-10-13 | End: 2017-11-03 | Stop reason: SDUPTHER

## 2017-11-03 ENCOUNTER — OFFICE VISIT (OUTPATIENT)
Dept: FAMILY MEDICINE CLINIC | Age: 82
End: 2017-11-03

## 2017-11-03 VITALS
TEMPERATURE: 97.7 F | HEART RATE: 88 BPM | SYSTOLIC BLOOD PRESSURE: 138 MMHG | DIASTOLIC BLOOD PRESSURE: 80 MMHG | RESPIRATION RATE: 18 BRPM

## 2017-11-03 DIAGNOSIS — F41.9 ANXIETY: ICD-10-CM

## 2017-11-03 DIAGNOSIS — D50.9 IRON DEFICIENCY ANEMIA, UNSPECIFIED IRON DEFICIENCY ANEMIA TYPE: ICD-10-CM

## 2017-11-03 DIAGNOSIS — R79.89 LOW TESTOSTERONE: ICD-10-CM

## 2017-11-03 DIAGNOSIS — Z86.79 HISTORY OF ATRIAL FIBRILLATION: ICD-10-CM

## 2017-11-03 DIAGNOSIS — Z23 NEED FOR PNEUMOCOCCAL VACCINATION: ICD-10-CM

## 2017-11-03 DIAGNOSIS — I10 ESSENTIAL HYPERTENSION: Primary | ICD-10-CM

## 2017-11-03 DIAGNOSIS — M17.10 PRIMARY OSTEOARTHRITIS OF KNEE, UNSPECIFIED LATERALITY: ICD-10-CM

## 2017-11-03 DIAGNOSIS — I10 ESSENTIAL HYPERTENSION: ICD-10-CM

## 2017-11-03 LAB
ALBUMIN SERPL-MCNC: 4 G/DL (ref 3.9–4.9)
ALP BLD-CCNC: 109 U/L (ref 35–104)
ALT SERPL-CCNC: 21 U/L (ref 0–41)
ANION GAP SERPL CALCULATED.3IONS-SCNC: 14 MEQ/L (ref 7–13)
AST SERPL-CCNC: 16 U/L (ref 0–40)
BASOPHILS ABSOLUTE: 0.1 K/UL (ref 0–0.2)
BASOPHILS RELATIVE PERCENT: 1.2 %
BILIRUB SERPL-MCNC: 1.2 MG/DL (ref 0–1.2)
BUN BLDV-MCNC: 23 MG/DL (ref 8–23)
CALCIUM SERPL-MCNC: 8.8 MG/DL (ref 8.6–10.2)
CHLORIDE BLD-SCNC: 104 MEQ/L (ref 98–107)
CO2: 25 MEQ/L (ref 22–29)
CREAT SERPL-MCNC: 0.7 MG/DL (ref 0.7–1.2)
EOSINOPHILS ABSOLUTE: 0.4 K/UL (ref 0–0.7)
EOSINOPHILS RELATIVE PERCENT: 4.8 %
GFR AFRICAN AMERICAN: >60
GFR NON-AFRICAN AMERICAN: >60
GLOBULIN: 3.1 G/DL (ref 2.3–3.5)
GLUCOSE BLD-MCNC: 88 MG/DL (ref 74–109)
HCT VFR BLD CALC: 41.4 % (ref 42–52)
HEMOGLOBIN: 13.6 G/DL (ref 14–18)
LYMPHOCYTES ABSOLUTE: 1.9 K/UL (ref 1–4.8)
LYMPHOCYTES RELATIVE PERCENT: 23.8 %
MCH RBC QN AUTO: 30.7 PG (ref 27–31.3)
MCHC RBC AUTO-ENTMCNC: 32.9 % (ref 33–37)
MCV RBC AUTO: 93.3 FL (ref 80–100)
MONOCYTES ABSOLUTE: 1 K/UL (ref 0.2–0.8)
MONOCYTES RELATIVE PERCENT: 12 %
NEUTROPHILS ABSOLUTE: 4.7 K/UL (ref 1.4–6.5)
NEUTROPHILS RELATIVE PERCENT: 58.2 %
PDW BLD-RTO: 14.4 % (ref 11.5–14.5)
PLATELET # BLD: 195 K/UL (ref 130–400)
POTASSIUM SERPL-SCNC: 4.4 MEQ/L (ref 3.5–5.1)
RBC # BLD: 4.44 M/UL (ref 4.7–6.1)
SODIUM BLD-SCNC: 143 MEQ/L (ref 132–144)
TOTAL PROTEIN: 7.1 G/DL (ref 6.4–8.1)
WBC # BLD: 8.2 K/UL (ref 4.8–10.8)

## 2017-11-03 PROCEDURE — 99213 OFFICE O/P EST LOW 20 MIN: CPT | Performed by: FAMILY MEDICINE

## 2017-11-03 PROCEDURE — G0009 ADMIN PNEUMOCOCCAL VACCINE: HCPCS | Performed by: FAMILY MEDICINE

## 2017-11-03 PROCEDURE — 90670 PCV13 VACCINE IM: CPT | Performed by: FAMILY MEDICINE

## 2017-11-03 RX ORDER — AMLODIPINE BESYLATE 2.5 MG/1
2.5 TABLET ORAL DAILY
Qty: 90 TABLET | Refills: 3 | Status: SHIPPED | OUTPATIENT
Start: 2017-11-03 | End: 2018-01-05 | Stop reason: SDUPTHER

## 2017-11-03 RX ORDER — ATORVASTATIN CALCIUM 20 MG/1
TABLET, FILM COATED ORAL
Qty: 90 TABLET | Refills: 3 | Status: SHIPPED | OUTPATIENT
Start: 2017-11-03 | End: 2018-01-05 | Stop reason: SDUPTHER

## 2017-11-03 RX ORDER — METOPROLOL SUCCINATE 25 MG/1
25 TABLET, EXTENDED RELEASE ORAL DAILY
Qty: 90 TABLET | Refills: 3 | Status: SHIPPED | OUTPATIENT
Start: 2017-11-03 | End: 2019-02-15 | Stop reason: SDUPTHER

## 2017-11-03 ASSESSMENT — ENCOUNTER SYMPTOMS
ABDOMINAL PAIN: 0
COUGH: 0
DIARRHEA: 0
EYES NEGATIVE: 1
NAUSEA: 0
SHORTNESS OF BREATH: 0
SORE THROAT: 0
CONSTIPATION: 0

## 2017-11-03 NOTE — PROGRESS NOTES
Subjective  Geovanna Licea, 81 y.o. male presents today with:  Chief Complaint   Patient presents with    Hypertension     PT presents today for f/u on bp. PT is on low sodium diet. He checks bp at home on regular basis but isnt sure if its accurate he gets higher reading than he does at drug store machine. HPI      Patient presents today for a follow-up hypertension   Taking current medications which were reviewed. Problem list discussed. Eating okay. Monitors blood pressure at home. Overall doing well. Has no new problem / question. Health Maintenance Is Up-To-Date       No other questions and or concerns for today's visit      Review of Systems   Constitutional: Negative for appetite change, fatigue and fever. HENT: Negative for congestion and sore throat. Eyes: Negative. Respiratory: Negative for cough and shortness of breath. Cardiovascular: Negative for chest pain and palpitations. Gastrointestinal: Negative for abdominal pain, constipation, diarrhea and nausea. Genitourinary: Negative for frequency. Neurological: Negative for dizziness. Past Medical History:   Diagnosis Date    Anxiety     Bilateral carotid artery disease ([address] 3/30/2017    BPH (benign prostatic hypertrophy)     History of atrial fibrillation 3/30/2017    Hypertension     Low testosterone     Osteoarthritis     Vertigo      Past Surgical History:   Procedure Laterality Date    JOINT REPLACEMENT Left 10/11/2016    total    KIDNEY STONE SURGERY       Social History     Social History    Marital status:       Spouse name: Cinthia Hernandez    Number of children: 0    Years of education: N/A     Occupational History     Retired     Social History Main Topics    Smoking status: Former Smoker     Packs/day: 3.00     Types: Cigarettes    Smokeless tobacco: Never Used    Alcohol use No    Drug use: No    Sexual activity: Not Currently     Other Topics Concern    Not on file Social History Narrative    Lives independently in on floor home--step children in the are are helpful. Has neighbors that help if needed. Family History   Problem Relation Age of Onset    Emphysema Father      Allergies   Allergen Reactions    Oxycodone Hcl Anaphylaxis and Other (See Comments)    Betamethasone Dipropionate Other (See Comments)     Regular steroids cause dim vision    Cortisone      Blurred vision    Pcn [Penicillins] Hives    Morphine And Related Nausea And Vomiting     Current Outpatient Prescriptions   Medication Sig Dispense Refill    metoprolol succinate (TOPROL XL) 25 MG extended release tablet Take 1 tablet by mouth daily 90 tablet 3    amLODIPine (NORVASC) 2.5 MG tablet Take 1 tablet by mouth daily 90 tablet 3    atorvastatin (LIPITOR) 20 MG tablet TAKE ONE TABLET BY MOUTH NIGHTLY 90 tablet 3    diclofenac (VOLTAREN) 75 MG EC tablet TAKE 1 TABLET TWICE A  tablet 1    tamsulosin (FLOMAX) 0.4 MG capsule TAKE 1 CAPSULE DAILY 90 capsule 2    hydrocortisone 2.5 % cream Apply topically 2 times daily. 60 g 5    aspirin 81 MG chewable tablet Take 1 tablet by mouth daily 30 tablet 3    meclizine (ANTIVERT) 25 MG tablet Take 1 tablet by mouth 3 times daily as needed for Dizziness 180 tablet 1    pantoprazole (PROTONIX) 40 MG tablet Take 1 tablet by mouth daily 30 tablet 3    hydrochlorothiazide (MICROZIDE) 12.5 MG capsule Take 1 capsule by mouth daily (Patient taking differently: Take 12.5 mg by mouth daily Takes only on M-W-F) 30 capsule 3    finasteride (PROSCAR) 5 MG tablet Take 5 mg by mouth daily       No current facility-administered medications for this visit. PMH, Surgical Hx, Family Hx, and Social Hx reviewed and updated. Health Maintenance reviewed. Objective    Vitals:    11/03/17 1008   BP: 138/80   Pulse: 88   Resp: 18   Temp: 97.7 °F (36.5 °C)   TempSrc: Temporal       Physical Exam    Assessment & Plan   1.  Essential hypertension controlled metoprolol succinate (TOPROL XL) 25 MG extended release tablet    amLODIPine (NORVASC) 2.5 MG tablet    Comprehensive Metabolic Panel    CBC Auto Differential   2. Anxiety stable Comprehensive Metabolic Panel    CBC Auto Differential   3. Primary osteoarthritis of knee, unspecified laterality     4. Low testosterone     5. Iron deficiency anemia, unspecified iron deficiency anemia type     6. Need for pneumococcal vaccination  Pneumococcal conjugate vaccine 13-valent IM (PREVNAR 13)   7. History of atrial fibrillation  amLODIPine (NORVASC) 2.5 MG tablet     Orders Placed This Encounter   Procedures    Pneumococcal conjugate vaccine 13-valent IM (PREVNAR 13)    Comprehensive Metabolic Panel     Standing Status:   Future     Number of Occurrences:   1     Standing Expiration Date:   11/3/2018    CBC Auto Differential     Standing Status:   Future     Number of Occurrences:   1     Standing Expiration Date:   11/3/2018     Orders Placed This Encounter   Medications    metoprolol succinate (TOPROL XL) 25 MG extended release tablet     Sig: Take 1 tablet by mouth daily     Dispense:  90 tablet     Refill:  3    amLODIPine (NORVASC) 2.5 MG tablet     Sig: Take 1 tablet by mouth daily     Dispense:  90 tablet     Refill:  3    atorvastatin (LIPITOR) 20 MG tablet     Sig: TAKE ONE TABLET BY MOUTH NIGHTLY     Dispense:  90 tablet     Refill:  3     Medications Discontinued During This Encounter   Medication Reason    metoprolol succinate (TOPROL XL) 25 MG extended release tablet Reorder    amLODIPine (NORVASC) 2.5 MG tablet Reorder    atorvastatin (LIPITOR) 20 MG tablet Reorder     No Follow-up on file. Long talk. Health maintenance issues reviewed and discussed with recommendations made. The importance of a good diet and exercise regimen discussed. Take medications as prescribed. Follow up as instructed.   Call if any problems        Controlled Substances Monitoring:          Prabhu Delaney MD          Please note this report has been partially produced using speech recognition software  And may cause contain errors related to that system including grammar, punctuation and spelling as well as words and phrases that may seem inappropriate. If there are questions or concerns please feel free to contact me to clarify.

## 2017-12-14 ENCOUNTER — CARE COORDINATION (OUTPATIENT)
Dept: CARE COORDINATION | Age: 82
End: 2017-12-14

## 2017-12-14 NOTE — CARE COORDINATION
Ambulatory Care Coordination Note  12/14/2017  CM Risk Score: No Risk Score On File  Chi Mortality Risk Score: 16.89    ACC: Rhonda Salinas RN    Summary Note: Patient continues to monitor BP daily in a.m. And p.p. Before taking BP medications, States generally in range of 160/90 in a.m. And in range of 130/85 in evening. He has quit checking BP in between \"unless I feel funny\". Patient vasyl has new Medicare advantage plan for 2018 and will bring information with to his appt. 1/5/18. States his knee pain has improved, lately not having any knee pain at all, has been more active. States has been doing some stretching exercises therapy gave him, and watching what he eats an has lost 5 lbs. Patient offers no concerns or issues today. He will bring BP log to his next appt. Care Coordination Interventions    Program Enrollment:  Rising Risk  Referral from Primary Care Provider:  Yes  Suggested Interventions and Community Resources  Fall Risk Prevention: In Process  Meals on Wheels:  Declined  Medi Set or Pill Pack:  Completed (Comment: patient uses a med-minder box and is undependent with managing the box., leopoldo. )  Senior Services:  Declined         Goals Addressed             Most Recent     Self Monitoring   On track (12/14/2017)             Blood Pressure - I will take my blood pressure as directed - Daily  I will notify my provider of any changes in blood pressure associated with symptoms of dizziness, falls, passing out, headache, confusion/change in mental status. Patient Reported Blood Pressure No flowsheet data found. Barriers: none  Plan for overcoming my barriers: N/A  Confidence: 8/10  Anticipated Goal Completion Date: 3 months              Prior to Admission medications    Medication Sig Start Date End Date Taking?  Authorizing Provider   metoprolol succinate (TOPROL XL) 25 MG extended release tablet Take 1 tablet by mouth daily 11/3/17  Yes Prescilla Landau, MD   amLODIPine (NORVASC) 2.5 MG tablet Take 1 tablet by mouth daily 11/3/17  Yes Joseph Mcgovern MD   atorvastatin (LIPITOR) 20 MG tablet TAKE ONE TABLET BY MOUTH NIGHTLY 11/3/17  Yes Joseph Mcgovern MD   diclofenac (VOLTAREN) 75 MG EC tablet TAKE 1 TABLET TWICE A DAY 9/21/17  Yes Joseph Mcgovern MD   tamsulosin (FLOMAX) 0.4 MG capsule TAKE 1 CAPSULE DAILY 3/20/17  Yes Joseph Mcgovern MD   finasteride (PROSCAR) 5 MG tablet Take 5 mg by mouth daily   Yes Historical Provider, MD   aspirin 81 MG chewable tablet Take 1 tablet by mouth daily 1/10/17  Yes Mahnaz Mckay, NATTY   meclizine (ANTIVERT) 25 MG tablet Take 1 tablet by mouth 3 times daily as needed for Dizziness 12/6/16  Yes Joseph Mcgovern MD   pantoprazole (PROTONIX) 40 MG tablet Take 1 tablet by mouth daily 6/2/17   Joseph Mcgovern MD   hydrochlorothiazide (MICROZIDE) 12.5 MG capsule Take 1 capsule by mouth daily  Patient taking differently: Take 12.5 mg by mouth daily Takes only on M-W-F 4/21/17   Joseph Mcgovern MD   hydrocortisone 2.5 % cream Apply topically 2 times daily.  2/22/17   Joseph Mcgovern MD       Future Appointments  Date Time Provider Jian Uribe   1/5/2018 10:00 AM Joseph Mcgovern MD [id number]     General Assessment    Do you have any symptoms that are causing concern?:  No      and

## 2018-01-05 ENCOUNTER — CARE COORDINATOR VISIT (OUTPATIENT)
Dept: CARE COORDINATION | Age: 83
End: 2018-01-05

## 2018-01-05 ENCOUNTER — OFFICE VISIT (OUTPATIENT)
Dept: FAMILY MEDICINE CLINIC | Age: 83
End: 2018-01-05

## 2018-01-05 VITALS
RESPIRATION RATE: 12 BRPM | BODY MASS INDEX: 29.86 KG/M2 | TEMPERATURE: 98.2 F | WEIGHT: 208.6 LBS | SYSTOLIC BLOOD PRESSURE: 130 MMHG | HEIGHT: 70 IN | DIASTOLIC BLOOD PRESSURE: 80 MMHG | HEART RATE: 60 BPM

## 2018-01-05 DIAGNOSIS — Z86.79 HISTORY OF ATRIAL FIBRILLATION: ICD-10-CM

## 2018-01-05 DIAGNOSIS — L30.9 ECZEMA, UNSPECIFIED TYPE: ICD-10-CM

## 2018-01-05 DIAGNOSIS — I10 ESSENTIAL HYPERTENSION: Primary | ICD-10-CM

## 2018-01-05 DIAGNOSIS — I63.9 CEREBROVASCULAR ACCIDENT (CVA), UNSPECIFIED MECHANISM (HCC): ICD-10-CM

## 2018-01-05 DIAGNOSIS — I48.0 AF (PAROXYSMAL ATRIAL FIBRILLATION) (HCC): ICD-10-CM

## 2018-01-05 DIAGNOSIS — R26.9 GAIT ABNORMALITY: ICD-10-CM

## 2018-01-05 PROCEDURE — G8427 DOCREV CUR MEDS BY ELIG CLIN: HCPCS | Performed by: FAMILY MEDICINE

## 2018-01-05 PROCEDURE — 1036F TOBACCO NON-USER: CPT | Performed by: FAMILY MEDICINE

## 2018-01-05 PROCEDURE — 99213 OFFICE O/P EST LOW 20 MIN: CPT | Performed by: FAMILY MEDICINE

## 2018-01-05 PROCEDURE — 1123F ACP DISCUSS/DSCN MKR DOCD: CPT | Performed by: FAMILY MEDICINE

## 2018-01-05 PROCEDURE — G8599 NO ASA/ANTIPLAT THER USE RNG: HCPCS | Performed by: FAMILY MEDICINE

## 2018-01-05 PROCEDURE — G8417 CALC BMI ABV UP PARAM F/U: HCPCS | Performed by: FAMILY MEDICINE

## 2018-01-05 PROCEDURE — 4040F PNEUMOC VAC/ADMIN/RCVD: CPT | Performed by: FAMILY MEDICINE

## 2018-01-05 PROCEDURE — G8483 FLU IMM NO ADMIN DOC REA: HCPCS | Performed by: FAMILY MEDICINE

## 2018-01-05 RX ORDER — AMLODIPINE BESYLATE 2.5 MG/1
2.5 TABLET ORAL DAILY
Qty: 90 TABLET | Refills: 3 | Status: SHIPPED | OUTPATIENT
Start: 2018-01-05 | End: 2018-09-28 | Stop reason: SDUPTHER

## 2018-01-05 RX ORDER — ATORVASTATIN CALCIUM 20 MG/1
TABLET, FILM COATED ORAL
Qty: 90 TABLET | Refills: 3 | Status: SHIPPED | OUTPATIENT
Start: 2018-01-05 | End: 2018-09-28 | Stop reason: SDUPTHER

## 2018-01-05 RX ORDER — METOPROLOL SUCCINATE 50 MG/1
50 TABLET, EXTENDED RELEASE ORAL DAILY
Qty: 90 TABLET | Refills: 3 | Status: SHIPPED | OUTPATIENT
Start: 2018-01-05 | End: 2018-09-28 | Stop reason: SDUPTHER

## 2018-01-05 RX ORDER — FINASTERIDE 5 MG/1
5 TABLET, FILM COATED ORAL DAILY
Qty: 90 TABLET | Refills: 3 | Status: SHIPPED | OUTPATIENT
Start: 2018-01-05 | End: 2018-04-06 | Stop reason: ALTCHOICE

## 2018-01-05 RX ORDER — TRIAMCINOLONE ACETONIDE 1 MG/G
CREAM TOPICAL
Qty: 45 G | Refills: 1 | Status: SHIPPED | OUTPATIENT
Start: 2018-01-05 | End: 2018-10-12 | Stop reason: ALTCHOICE

## 2018-01-05 RX ORDER — MECLIZINE HYDROCHLORIDE 25 MG/1
25 TABLET ORAL 3 TIMES DAILY PRN
Qty: 180 TABLET | Refills: 3 | Status: SHIPPED | OUTPATIENT
Start: 2018-01-05 | End: 2018-06-15 | Stop reason: SDUPTHER

## 2018-01-05 RX ORDER — DICLOFENAC SODIUM 75 MG/1
TABLET, DELAYED RELEASE ORAL
Qty: 180 TABLET | Refills: 3 | Status: SHIPPED | OUTPATIENT
Start: 2018-01-05 | End: 2018-09-28 | Stop reason: SDUPTHER

## 2018-01-05 RX ORDER — TAMSULOSIN HYDROCHLORIDE 0.4 MG/1
CAPSULE ORAL
Qty: 90 CAPSULE | Refills: 3 | Status: SHIPPED | OUTPATIENT
Start: 2018-01-05 | End: 2021-11-30

## 2018-01-05 ASSESSMENT — ENCOUNTER SYMPTOMS
EYES NEGATIVE: 1
DIARRHEA: 0
NAUSEA: 0
COUGH: 0
SHORTNESS OF BREATH: 0
CONSTIPATION: 0
ABDOMINAL PAIN: 0

## 2018-01-05 NOTE — PATIENT INSTRUCTIONS
Patient Education        Preventing Falls: Care Instructions  Your Care Instructions    Getting around your home safely can be a challenge if you have injuries or health problems that make it easy for you to fall. Loose rugs and furniture in walkways are among the dangers for many older people who have problems walking or who have poor eyesight. People who have conditions such as arthritis, osteoporosis, or dementia also have to be careful not to fall. You can make your home safer with a few simple measures. Follow-up care is a key part of your treatment and safety. Be sure to make and go to all appointments, and call your doctor if you are having problems. It's also a good idea to know your test results and keep a list of the medicines you take. How can you care for yourself at home? Taking care of yourself  · You may get dizzy if you do not drink enough water. To prevent dehydration, drink plenty of fluids, enough so that your urine is light yellow or clear like water. Choose water and other caffeine-free clear liquids. If you have kidney, heart, or liver disease and have to limit fluids, talk with your doctor before you increase the amount of fluids you drink. · Exercise regularly to improve your strength, muscle tone, and balance. Walk if you can. Swimming may be a good choice if you cannot walk easily. · Have your vision and hearing checked each year or any time you notice a change. If you have trouble seeing and hearing, you might not be able to avoid objects and could lose your balance. · Know the side effects of the medicines you take. Ask your doctor or pharmacist whether the medicines you take can affect your balance. Sleeping pills or sedatives can affect your balance. · Limit the amount of alcohol you drink. Alcohol can impair your balance and other senses. · Ask your doctor whether calluses or corns on your feet need to be removed.  If you wear loose-fitting shoes because of calluses or corns, you can lose your balance and fall. · Talk to your doctor if you have numbness in your feet. Preventing falls at home  · Remove raised doorway thresholds, throw rugs, and clutter. Repair loose carpet or raised areas in the floor. · Move furniture and electrical cords to keep them out of walking paths. · Use nonskid floor wax, and wipe up spills right away, especially on ceramic tile floors. · If you use a walker or cane, put rubber tips on it. If you use crutches, clean the bottoms of them regularly with an abrasive pad, such as steel wool. · Keep your house well lit, especially [place], and outside walkways. Use night-lights in areas such as hallways and bathrooms. Add extra light switches or use remote switches (such as switches that go on or off when you clap your hands) to make it easier to turn lights on if you have to get up during the night. · Install sturdy handrails on stairways. · Move items in your cabinets so that the things you use a lot are on the lower shelves (about waist level). · Keep a cordless phone and a flashlight with new batteries by your bed. If possible, put a phone in each of the main rooms of your house, or carry a cell phone in case you fall and cannot reach a phone. Or, you can wear a device around your neck or wrist. You push a button that sends a signal for help. · Wear low-heeled shoes that fit well and give your feet good support. Use footwear with nonskid soles. Check the heels and soles of your shoes for wear. Repair or replace worn heels or soles. · Do not wear socks without shoes on wood floors. · Walk on the grass when the sidewalks are slippery. If you live in an area that gets snow and ice in the winter, sprinkle salt on slippery steps and sidewalks. Preventing falls in the bath  · Install grab bars and nonskid mats inside and outside your shower or tub and near the toilet and sinks. · Use shower chairs and bath benches.   · Use a hand-held shower head use grippers that can be worn over your shoes in bad weather. · Be extra careful if weather is bad. Walk on the grass when the sidewalks are slick. If you live in a place that gets snow and ice in the winter, sprinkle salt on slippery stairs and sidewalks. · Be careful getting on or off buses and trains or getting in and out of cars. If handrails are available, use them. · Be careful when you cross the street. Look for crosswalks or places where curb cuts or ramps are present. · Try not to hurry, especially if you are carrying something. · Be cautious in parking lots or garages. There may be curbs or changes in pavement, or the height of the pavement may vary. · Make sure to wear the correct eyeglasses, if you need them. Reading glasses or bifocals can make it harder to see hazards that might be in your way. · If you are walking outdoors for exercise, try to:  ¨ Walk in well-lighted, well-maintained areas. These include high school or college tracks, shopping malls, and public spaces. ¨ Walk with a partner. ¨ Watch out for cracked sidewalks, curbs, changes in the height of the pavement, exposed tree roots, and debris such as fallen leaves or branches. Where can you learn more? Go to https://[employer].commagdyeb.[employer]. org and sign in to your [employer] account. Enter H213 in the [facility] box to learn more about \"Preventing Outdoor Falls: Care Instructions. \"     If you do not have an account, please click on the \"Sign Up Now\" link. Current as of: May 12, 2017  Content Version: 11.5  © 6509-2928 [employer]. Care instructions adapted under license by [facility] ([facility]). If you have questions about a medical condition or this instruction, always ask your healthcare professional. [place] [address] any warranty or liability for your use of this information.        Patient Education        How to Get Up Safely After a Fall: Care Instructions  Your Care Instructions    If

## 2018-01-05 NOTE — CARE COORDINATION
status. Patient Reported Blood Pressure No flowsheet data found. Barriers: none  Plan for overcoming my barriers: N/A  Confidence: 8/10  Anticipated Goal Completion Date: 3 months              Prior to Admission medications    Medication Sig Start Date End Date Taking? Authorizing Provider   amLODIPine (NORVASC) 2.5 MG tablet Take 1 tablet by mouth daily 1/5/18   Christopher Hyde MD   atorvastatin (LIPITOR) 20 MG tablet TAKE ONE TABLET BY MOUTH NIGHTLY 1/5/18   Christopher Hyde, MD   diclofenac (VOLTAREN) 75 MG EC tablet TAKE 1 TABLET TWICE A DAY 1/5/18   Christopher Hyde MD   finasteride (PROSCAR) 5 MG tablet Take 1 tablet by mouth daily 1/5/18   Christopher Hyde, MD   meclizine (ANTIVERT) 25 MG tablet Take 1 tablet by mouth 3 times daily as needed for Dizziness 1/5/18   Christopher Hyde, MD   tamsulosin [facility]) 0.4 MG capsule TAKE 1 CAPSULE DAILY 1/5/18   Christopher Hyde MD   metoprolol succinate (TOPROL XL) 50 MG extended release tablet Take 1 tablet by mouth daily 1/5/18   Christopher Hyde MD   triamcinolone (KENALOG) 0.1 % cream Apply topically 2 times daily. 1/5/18   Christopher Hyde MD   metoprolol succinate (TOPROL XL) 25 MG extended release tablet Take 1 tablet by mouth daily 11/3/17   Christopher Hyde MD   hydrochlorothiazide (MICROZIDE) 12.5 MG capsule Take 1 capsule by mouth daily  Patient taking differently: Take 12.5 mg by mouth daily Takes only on M-W-F 4/21/17   Christopher Hyde MD   hydrocortisone 2.5 % cream Apply topically 2 times daily.  2/22/17   Christopher Hyde MD   aspirin 81 MG chewable tablet Take 1 tablet by mouth daily 1/10/17   Yuval Munoz NP       Future Appointments  Date Time Provider Jian Uribe   4/6/2018 10:00 AM MD Laurita Parada[address]     General Assessment    Do you have any symptoms that are causing concern?:  Yes  Progression since Onset:  Intermittent - Waxing/Waning  Reported Symptoms:  Other (Comment: dry skin and itching of skin LLE, has scabs from scratching )      and

## 2018-01-05 NOTE — PROGRESS NOTES
diclofenac (VOLTAREN) 75 MG EC tablet     Sig: TAKE 1 TABLET TWICE A DAY     Dispense:  180 tablet     Refill:  3    finasteride (PROSCAR) 5 MG tablet     Sig: Take 1 tablet by mouth daily     Dispense:  90 tablet     Refill:  3    meclizine (ANTIVERT) 25 MG tablet     Sig: Take 1 tablet by mouth 3 times daily as needed for Dizziness     Dispense:  180 tablet     Refill:  3    tamsulosin (FLOMAX) 0.4 MG capsule     Sig: TAKE 1 CAPSULE DAILY     Dispense:  90 capsule     Refill:  3    metoprolol succinate (TOPROL XL) 50 MG extended release tablet     Sig: Take 1 tablet by mouth daily     Dispense:  90 tablet     Refill:  3    triamcinolone (KENALOG) 0.1 % cream     Sig: Apply topically 2 times daily. Dispense:  45 g     Refill:  1     Medications Discontinued During This Encounter   Medication Reason    pantoprazole (PROTONIX) 40 MG tablet Therapy completed    tamsulosin (FLOMAX) 0.4 MG capsule Therapy completed    amLODIPine (NORVASC) 2.5 MG tablet Reorder    atorvastatin (LIPITOR) 20 MG tablet Reorder    diclofenac (VOLTAREN) 75 MG EC tablet Reorder    finasteride (PROSCAR) 5 MG tablet Reorder    meclizine (ANTIVERT) 25 MG tablet Reorder     Return in about 3 months (around 4/5/2018). Long talk. Health maintenance issues reviewed and discussed with recommendations made. The importance of a good diet and exercise regimen discussed. Take medications as prescribed. Follow up as instructed. Call if any problems    Controlled Substances Monitoring:          Oskar Ricardo MD          Please note this report has been partially produced using speech recognition software  And may cause contain errors related to that system including grammar, punctuation and spelling as well as words and phrases that may seem inappropriate. If there are questions or concerns please feel free to contact me to clarify.

## 2018-03-22 ENCOUNTER — CARE COORDINATION (OUTPATIENT)
Dept: CARE COORDINATION | Age: 83
End: 2018-03-22

## 2018-03-22 RX ORDER — VIT B/MIN/SAW PALMETTO/PYGEUM 160-12.5MG
2 CAPSULE ORAL DAILY
COMMUNITY
Start: 2018-03-22 | End: 2020-02-27 | Stop reason: ALTCHOICE

## 2018-03-22 NOTE — CARE COORDINATION
Ambulatory Care Coordination Note  3/22/2018  CM Risk Score: 4  Chi Mortality Risk Score: 13.08    ACC: Leeroy Puckett, NICHO    Summary Note: States \"feel pretty good\", tries to stay busy as he can, doesn't gp out much. States follows low salt diet, doesn't add any salt and only buys low salt or no added salt labeled items. BP ranges 110//90, states compliant with metoprolol dose based on BP scale , taking either 25 mg or 50 mg dose. Has occasional dizziness, takes Antivert with good relief. He stopped Finasteride and replaced with OTC supplement Urinozinc OTC due to did not like possible side effects of finasteride , states urine flow is ok as long as he takes Urinozinc. He has had no numbness/tingling. He will bring BP log to f/u appt. reviewed home safety , falls prevention: states tries to be careful and has had no falls. Dicussed care coordination program discharge but states he wants intermittent calls to continues, makes him feel \"like somebody cares\". Care Coordination Interventions    Program Enrollment:  Rising Risk  Referral from Primary Care Provider:  Yes  Suggested Interventions and Community Resources  Fall Risk Prevention:   In Process  Meals on Wheels:  Declined  Medi Set or Pill Pack:  Completed (Comment: patient uses a med-minder box and is undependent with managing the box., leopoldo. )  Senior Services:  Declined         Goals Addressed             Most Recent     Reduce Falls    On track (3/22/2018)             I will reduce my risk of falls by the following: reading falls prevfention and home safety educ handouts     Barriers: none  Plan for overcoming my barriers: N/A  Confidence: 10/10  Anticipated Goal Completion Date:1 month       Self Monitoring   On track (3/22/2018)             Blood Pressure - I will take my blood pressure as directed - Daily  I will notify my provider of any changes in blood pressure associated with symptoms of dizziness, falls, passing out, headache,

## 2018-04-06 ENCOUNTER — CARE COORDINATOR VISIT (OUTPATIENT)
Dept: CARE COORDINATION | Age: 83
End: 2018-04-06

## 2018-04-06 ENCOUNTER — OFFICE VISIT (OUTPATIENT)
Dept: FAMILY MEDICINE CLINIC | Age: 83
End: 2018-04-06
Payer: MEDICARE

## 2018-04-06 VITALS
HEART RATE: 57 BPM | SYSTOLIC BLOOD PRESSURE: 138 MMHG | DIASTOLIC BLOOD PRESSURE: 80 MMHG | TEMPERATURE: 97.1 F | HEIGHT: 70 IN | BODY MASS INDEX: 30.24 KG/M2 | OXYGEN SATURATION: 97 % | WEIGHT: 211.25 LBS

## 2018-04-06 DIAGNOSIS — Z13.220 SCREENING CHOLESTEROL LEVEL: ICD-10-CM

## 2018-04-06 DIAGNOSIS — Z12.5 SCREENING PSA (PROSTATE SPECIFIC ANTIGEN): ICD-10-CM

## 2018-04-06 DIAGNOSIS — Z86.79 HISTORY OF ATRIAL FIBRILLATION: ICD-10-CM

## 2018-04-06 DIAGNOSIS — I10 ESSENTIAL HYPERTENSION: ICD-10-CM

## 2018-04-06 DIAGNOSIS — I10 ESSENTIAL HYPERTENSION: Primary | ICD-10-CM

## 2018-04-06 DIAGNOSIS — M15.9 PRIMARY OSTEOARTHRITIS INVOLVING MULTIPLE JOINTS: ICD-10-CM

## 2018-04-06 DIAGNOSIS — R26.9 GAIT ABNORMALITY: ICD-10-CM

## 2018-04-06 LAB
ALBUMIN SERPL-MCNC: 3.9 G/DL (ref 3.9–4.9)
ALP BLD-CCNC: 106 U/L (ref 35–104)
ALT SERPL-CCNC: 20 U/L (ref 0–41)
ANION GAP SERPL CALCULATED.3IONS-SCNC: 14 MEQ/L (ref 7–13)
AST SERPL-CCNC: 16 U/L (ref 0–40)
BILIRUB SERPL-MCNC: 1.6 MG/DL (ref 0–1.2)
BUN BLDV-MCNC: 28 MG/DL (ref 8–23)
CALCIUM SERPL-MCNC: 8.8 MG/DL (ref 8.6–10.2)
CHLORIDE BLD-SCNC: 102 MEQ/L (ref 98–107)
CO2: 24 MEQ/L (ref 22–29)
CREAT SERPL-MCNC: 0.83 MG/DL (ref 0.7–1.2)
GFR AFRICAN AMERICAN: >60
GFR NON-AFRICAN AMERICAN: >60
GLOBULIN: 2.7 G/DL (ref 2.3–3.5)
GLUCOSE BLD-MCNC: 71 MG/DL (ref 74–109)
POTASSIUM SERPL-SCNC: 3.9 MEQ/L (ref 3.5–5.1)
PROSTATE SPECIFIC ANTIGEN: 92.06 NG/ML (ref 0–6.22)
SODIUM BLD-SCNC: 140 MEQ/L (ref 132–144)
TOTAL PROTEIN: 6.6 G/DL (ref 6.4–8.1)

## 2018-04-06 PROCEDURE — 99213 OFFICE O/P EST LOW 20 MIN: CPT | Performed by: FAMILY MEDICINE

## 2018-04-06 PROCEDURE — 4040F PNEUMOC VAC/ADMIN/RCVD: CPT | Performed by: FAMILY MEDICINE

## 2018-04-06 PROCEDURE — G8427 DOCREV CUR MEDS BY ELIG CLIN: HCPCS | Performed by: FAMILY MEDICINE

## 2018-04-06 PROCEDURE — G8599 NO ASA/ANTIPLAT THER USE RNG: HCPCS | Performed by: FAMILY MEDICINE

## 2018-04-06 PROCEDURE — G8417 CALC BMI ABV UP PARAM F/U: HCPCS | Performed by: FAMILY MEDICINE

## 2018-04-06 PROCEDURE — 1036F TOBACCO NON-USER: CPT | Performed by: FAMILY MEDICINE

## 2018-04-06 PROCEDURE — 1123F ACP DISCUSS/DSCN MKR DOCD: CPT | Performed by: FAMILY MEDICINE

## 2018-04-08 ASSESSMENT — ENCOUNTER SYMPTOMS
SHORTNESS OF BREATH: 0
SORE THROAT: 0
COUGH: 0
DIARRHEA: 0
EYES NEGATIVE: 1
ABDOMINAL PAIN: 0
CONSTIPATION: 0
NAUSEA: 0

## 2018-04-09 ENCOUNTER — TELEPHONE (OUTPATIENT)
Dept: FAMILY MEDICINE CLINIC | Age: 83
End: 2018-04-09

## 2018-04-09 DIAGNOSIS — R97.20 ELEVATED PSA: Primary | ICD-10-CM

## 2018-06-15 RX ORDER — MECLIZINE HYDROCHLORIDE 25 MG/1
25 TABLET ORAL 3 TIMES DAILY PRN
Qty: 100 TABLET | Refills: 0 | Status: SHIPPED | OUTPATIENT
Start: 2018-06-15 | End: 2018-08-24 | Stop reason: SDUPTHER

## 2018-06-19 ENCOUNTER — HOSPITAL ENCOUNTER (OUTPATIENT)
Dept: NUCLEAR MEDICINE | Age: 83
Discharge: HOME | End: 2018-06-21
Payer: MEDICARE

## 2018-06-19 DIAGNOSIS — C61 PROSTATE CA (HCC): ICD-10-CM

## 2018-06-19 PROCEDURE — A9503 TC99M MEDRONATE: HCPCS | Performed by: UROLOGY

## 2018-06-19 PROCEDURE — 78306 BONE IMAGING WHOLE BODY: CPT

## 2018-06-19 PROCEDURE — 3430000000 HC RX DIAGNOSTIC RADIOPHARMACEUTICAL: Performed by: UROLOGY

## 2018-06-19 RX ORDER — TC 99M MEDRONATE 20 MG/10ML
25 INJECTION, POWDER, LYOPHILIZED, FOR SOLUTION INTRAVENOUS
Status: COMPLETED | OUTPATIENT
Start: 2018-06-19 | End: 2018-06-19

## 2018-06-19 RX ADMIN — Medication 25.1 MILLICURIE: at 10:35

## 2018-06-21 DIAGNOSIS — I10 ESSENTIAL HYPERTENSION: ICD-10-CM

## 2018-06-21 DIAGNOSIS — R60.9 EDEMA, UNSPECIFIED TYPE: ICD-10-CM

## 2018-06-22 RX ORDER — AMLODIPINE BESYLATE 5 MG/1
TABLET ORAL
Qty: 30 TABLET | Refills: 3 | Status: SHIPPED | OUTPATIENT
Start: 2018-06-22 | End: 2018-10-12 | Stop reason: DRUGHIGH

## 2018-07-09 ENCOUNTER — CARE COORDINATION (OUTPATIENT)
Dept: CARE COORDINATION | Age: 83
End: 2018-07-09

## 2018-07-10 ENCOUNTER — CARE COORDINATION (OUTPATIENT)
Dept: CARE COORDINATION | Age: 83
End: 2018-07-10

## 2018-08-27 RX ORDER — MECLIZINE HYDROCHLORIDE 25 MG/1
25 TABLET ORAL 3 TIMES DAILY PRN
Qty: 90 TABLET | Refills: 2 | Status: SHIPPED | OUTPATIENT
Start: 2018-08-27 | End: 2019-04-02 | Stop reason: SDUPTHER

## 2018-09-05 ENCOUNTER — TELEPHONE (OUTPATIENT)
Dept: FAMILY MEDICINE CLINIC | Age: 83
End: 2018-09-05

## 2018-09-05 NOTE — TELEPHONE ENCOUNTER
Pt is calling because he takes Voltaren for his arthritis and he was reading on the intranet that Voltaren can contribute to heart problems. He's already had a stroke and wants to take all precautions.  Wanted to know if there was something else you could prescribe instead of the Voltaren  Please advise  Thanks        Preferred pharmacy [id number]

## 2018-09-11 RX ORDER — MELOXICAM 15 MG/1
15 TABLET ORAL DAILY
Qty: 30 TABLET | Refills: 3 | Status: SHIPPED | OUTPATIENT
Start: 2018-09-11 | End: 2018-10-12 | Stop reason: ALTCHOICE

## 2018-09-28 DIAGNOSIS — Z86.79 HISTORY OF ATRIAL FIBRILLATION: ICD-10-CM

## 2018-09-28 DIAGNOSIS — I10 ESSENTIAL HYPERTENSION: ICD-10-CM

## 2018-09-28 RX ORDER — ATORVASTATIN CALCIUM 20 MG/1
TABLET, FILM COATED ORAL
Qty: 90 TABLET | Refills: 0 | Status: SHIPPED | OUTPATIENT
Start: 2018-09-28 | End: 2018-12-14 | Stop reason: SDUPTHER

## 2018-09-28 RX ORDER — AMLODIPINE BESYLATE 2.5 MG/1
2.5 TABLET ORAL DAILY
Qty: 90 TABLET | Refills: 0 | Status: SHIPPED | OUTPATIENT
Start: 2018-09-28 | End: 2018-12-14 | Stop reason: SDUPTHER

## 2018-09-28 RX ORDER — METOPROLOL SUCCINATE 50 MG/1
50 TABLET, EXTENDED RELEASE ORAL DAILY
Qty: 90 TABLET | Refills: 0 | Status: SHIPPED | OUTPATIENT
Start: 2018-09-28 | End: 2018-12-14 | Stop reason: SDUPTHER

## 2018-09-28 RX ORDER — DICLOFENAC SODIUM 75 MG/1
TABLET, DELAYED RELEASE ORAL
Qty: 180 TABLET | Refills: 0 | Status: SHIPPED | OUTPATIENT
Start: 2018-09-28 | End: 2018-12-14 | Stop reason: SDUPTHER

## 2018-10-12 ENCOUNTER — OFFICE VISIT (OUTPATIENT)
Dept: FAMILY MEDICINE CLINIC | Age: 83
End: 2018-10-12
Payer: MEDICARE

## 2018-10-12 VITALS
DIASTOLIC BLOOD PRESSURE: 82 MMHG | SYSTOLIC BLOOD PRESSURE: 128 MMHG | RESPIRATION RATE: 16 BRPM | BODY MASS INDEX: 29.09 KG/M2 | HEART RATE: 68 BPM | TEMPERATURE: 97.4 F | HEIGHT: 70 IN | WEIGHT: 203.2 LBS

## 2018-10-12 DIAGNOSIS — I77.9 BILATERAL CAROTID ARTERY DISEASE, UNSPECIFIED TYPE (HCC): ICD-10-CM

## 2018-10-12 DIAGNOSIS — G45.9 TIA (TRANSIENT ISCHEMIC ATTACK): ICD-10-CM

## 2018-10-12 DIAGNOSIS — I48.0 AF (PAROXYSMAL ATRIAL FIBRILLATION) (HCC): ICD-10-CM

## 2018-10-12 DIAGNOSIS — C61 PROSTATE CANCER (HCC): ICD-10-CM

## 2018-10-12 DIAGNOSIS — F41.9 ANXIETY: ICD-10-CM

## 2018-10-12 DIAGNOSIS — I10 ESSENTIAL HYPERTENSION: ICD-10-CM

## 2018-10-12 DIAGNOSIS — R79.89 LOW TESTOSTERONE: ICD-10-CM

## 2018-10-12 DIAGNOSIS — I77.9 DISORDER OF ARTERIES AND ARTERIOLES (HCC): ICD-10-CM

## 2018-10-12 DIAGNOSIS — I10 ESSENTIAL HYPERTENSION: Primary | ICD-10-CM

## 2018-10-12 LAB
BASOPHILS ABSOLUTE: 0.1 K/UL (ref 0–0.2)
BASOPHILS RELATIVE PERCENT: 1.2 %
CHOLESTEROL, TOTAL: 108 MG/DL (ref 0–199)
EOSINOPHILS ABSOLUTE: 0.4 K/UL (ref 0–0.7)
EOSINOPHILS RELATIVE PERCENT: 4.9 %
HCT VFR BLD CALC: 41 % (ref 42–52)
HDLC SERPL-MCNC: 38 MG/DL (ref 40–59)
HEMOGLOBIN: 13.7 G/DL (ref 14–18)
LDL CHOLESTEROL CALCULATED: 59 MG/DL (ref 0–129)
LYMPHOCYTES ABSOLUTE: 2.1 K/UL (ref 1–4.8)
LYMPHOCYTES RELATIVE PERCENT: 28.1 %
MCH RBC QN AUTO: 30.8 PG (ref 27–31.3)
MCHC RBC AUTO-ENTMCNC: 33.3 % (ref 33–37)
MCV RBC AUTO: 92.5 FL (ref 80–100)
MONOCYTES ABSOLUTE: 1 K/UL (ref 0.2–0.8)
MONOCYTES RELATIVE PERCENT: 13.3 %
NEUTROPHILS ABSOLUTE: 3.9 K/UL (ref 1.4–6.5)
NEUTROPHILS RELATIVE PERCENT: 52.5 %
PDW BLD-RTO: 15.5 % (ref 11.5–14.5)
PLATELET # BLD: 173 K/UL (ref 130–400)
RBC # BLD: 4.43 M/UL (ref 4.7–6.1)
TRIGL SERPL-MCNC: 54 MG/DL (ref 0–200)
WBC # BLD: 7.4 K/UL (ref 4.8–10.8)

## 2018-10-12 PROCEDURE — G8417 CALC BMI ABV UP PARAM F/U: HCPCS | Performed by: FAMILY MEDICINE

## 2018-10-12 PROCEDURE — 4040F PNEUMOC VAC/ADMIN/RCVD: CPT | Performed by: FAMILY MEDICINE

## 2018-10-12 PROCEDURE — 1101F PT FALLS ASSESS-DOCD LE1/YR: CPT | Performed by: FAMILY MEDICINE

## 2018-10-12 PROCEDURE — G8427 DOCREV CUR MEDS BY ELIG CLIN: HCPCS | Performed by: FAMILY MEDICINE

## 2018-10-12 PROCEDURE — 1036F TOBACCO NON-USER: CPT | Performed by: FAMILY MEDICINE

## 2018-10-12 PROCEDURE — G8599 NO ASA/ANTIPLAT THER USE RNG: HCPCS | Performed by: FAMILY MEDICINE

## 2018-10-12 PROCEDURE — 1123F ACP DISCUSS/DSCN MKR DOCD: CPT | Performed by: FAMILY MEDICINE

## 2018-10-12 PROCEDURE — G8483 FLU IMM NO ADMIN DOC REA: HCPCS | Performed by: FAMILY MEDICINE

## 2018-10-12 PROCEDURE — 99214 OFFICE O/P EST MOD 30 MIN: CPT | Performed by: FAMILY MEDICINE

## 2018-10-12 ASSESSMENT — PATIENT HEALTH QUESTIONNAIRE - PHQ9
SUM OF ALL RESPONSES TO PHQ QUESTIONS 1-9: 2
2. FEELING DOWN, DEPRESSED OR HOPELESS: 1
SUM OF ALL RESPONSES TO PHQ QUESTIONS 1-9: 2
SUM OF ALL RESPONSES TO PHQ9 QUESTIONS 1 & 2: 2
1. LITTLE INTEREST OR PLEASURE IN DOING THINGS: 1

## 2018-10-12 ASSESSMENT — ENCOUNTER SYMPTOMS
EYES NEGATIVE: 1
DIARRHEA: 0
CONSTIPATION: 0
NAUSEA: 0
SHORTNESS OF BREATH: 0
ABDOMINAL PAIN: 0
COUGH: 0

## 2018-12-14 DIAGNOSIS — Z86.79 HISTORY OF ATRIAL FIBRILLATION: ICD-10-CM

## 2018-12-14 DIAGNOSIS — I10 ESSENTIAL HYPERTENSION: ICD-10-CM

## 2018-12-14 RX ORDER — ATORVASTATIN CALCIUM 20 MG/1
TABLET, FILM COATED ORAL
Qty: 90 TABLET | Refills: 1 | Status: SHIPPED | OUTPATIENT
Start: 2018-12-14 | End: 2020-02-27 | Stop reason: ALTCHOICE

## 2018-12-14 RX ORDER — AMLODIPINE BESYLATE 2.5 MG/1
2.5 TABLET ORAL DAILY
Qty: 90 TABLET | Refills: 1 | Status: SHIPPED | OUTPATIENT
Start: 2018-12-14

## 2018-12-14 RX ORDER — METOPROLOL SUCCINATE 50 MG/1
50 TABLET, EXTENDED RELEASE ORAL DAILY
Qty: 90 TABLET | Refills: 1 | Status: SHIPPED | OUTPATIENT
Start: 2018-12-14 | End: 2020-02-27 | Stop reason: DRUGHIGH

## 2018-12-14 RX ORDER — DICLOFENAC SODIUM 75 MG/1
TABLET, DELAYED RELEASE ORAL
Qty: 180 TABLET | Refills: 1 | Status: SHIPPED | OUTPATIENT
Start: 2018-12-14 | End: 2020-02-27 | Stop reason: ALTCHOICE

## 2019-02-15 DIAGNOSIS — I10 ESSENTIAL HYPERTENSION: ICD-10-CM

## 2019-02-15 RX ORDER — METOPROLOL SUCCINATE 25 MG/1
TABLET, EXTENDED RELEASE ORAL
Qty: 90 TABLET | Refills: 1 | Status: SHIPPED | OUTPATIENT
Start: 2019-02-15 | End: 2020-02-27 | Stop reason: DRUGHIGH

## 2019-03-19 ENCOUNTER — OFFICE VISIT (OUTPATIENT)
Dept: FAMILY MEDICINE CLINIC | Age: 84
End: 2019-03-19
Payer: MEDICARE

## 2019-03-19 VITALS
WEIGHT: 209.8 LBS | SYSTOLIC BLOOD PRESSURE: 122 MMHG | DIASTOLIC BLOOD PRESSURE: 80 MMHG | HEART RATE: 56 BPM | BODY MASS INDEX: 30.03 KG/M2 | TEMPERATURE: 97.8 F | RESPIRATION RATE: 12 BRPM | HEIGHT: 70 IN

## 2019-03-19 DIAGNOSIS — B96.89 ACUTE BACTERIAL SINUSITIS: ICD-10-CM

## 2019-03-19 DIAGNOSIS — I10 ESSENTIAL HYPERTENSION: ICD-10-CM

## 2019-03-19 DIAGNOSIS — I77.9 BILATERAL CAROTID ARTERY DISEASE, UNSPECIFIED TYPE (HCC): ICD-10-CM

## 2019-03-19 DIAGNOSIS — F41.9 ANXIETY: ICD-10-CM

## 2019-03-19 DIAGNOSIS — Z12.5 SPECIAL SCREENING FOR MALIGNANT NEOPLASM OF PROSTATE: ICD-10-CM

## 2019-03-19 DIAGNOSIS — J01.90 ACUTE BACTERIAL SINUSITIS: ICD-10-CM

## 2019-03-19 DIAGNOSIS — I48.0 AF (PAROXYSMAL ATRIAL FIBRILLATION) (HCC): ICD-10-CM

## 2019-03-19 DIAGNOSIS — I10 ESSENTIAL HYPERTENSION: Primary | ICD-10-CM

## 2019-03-19 LAB
ALBUMIN SERPL-MCNC: 4 G/DL (ref 3.5–4.6)
ALP BLD-CCNC: 102 U/L (ref 35–104)
ALT SERPL-CCNC: 34 U/L (ref 0–41)
ANION GAP SERPL CALCULATED.3IONS-SCNC: 12 MEQ/L (ref 9–15)
AST SERPL-CCNC: 30 U/L (ref 0–40)
BILIRUB SERPL-MCNC: 1.1 MG/DL (ref 0.2–0.7)
BUN BLDV-MCNC: 22 MG/DL (ref 8–23)
CALCIUM SERPL-MCNC: 9 MG/DL (ref 8.5–9.9)
CHLORIDE BLD-SCNC: 106 MEQ/L (ref 95–107)
CO2: 25 MEQ/L (ref 20–31)
CREAT SERPL-MCNC: 0.71 MG/DL (ref 0.7–1.2)
GFR AFRICAN AMERICAN: >60
GFR NON-AFRICAN AMERICAN: >60
GLOBULIN: 3.3 G/DL (ref 2.3–3.5)
GLUCOSE BLD-MCNC: 95 MG/DL (ref 70–99)
POTASSIUM SERPL-SCNC: 4.1 MEQ/L (ref 3.4–4.9)
SODIUM BLD-SCNC: 143 MEQ/L (ref 135–144)
TOTAL PROTEIN: 7.3 G/DL (ref 6.3–8)

## 2019-03-19 PROCEDURE — 1101F PT FALLS ASSESS-DOCD LE1/YR: CPT | Performed by: FAMILY MEDICINE

## 2019-03-19 PROCEDURE — G8417 CALC BMI ABV UP PARAM F/U: HCPCS | Performed by: FAMILY MEDICINE

## 2019-03-19 PROCEDURE — 1036F TOBACCO NON-USER: CPT | Performed by: FAMILY MEDICINE

## 2019-03-19 PROCEDURE — G8599 NO ASA/ANTIPLAT THER USE RNG: HCPCS | Performed by: FAMILY MEDICINE

## 2019-03-19 PROCEDURE — 1123F ACP DISCUSS/DSCN MKR DOCD: CPT | Performed by: FAMILY MEDICINE

## 2019-03-19 PROCEDURE — 99214 OFFICE O/P EST MOD 30 MIN: CPT | Performed by: FAMILY MEDICINE

## 2019-03-19 PROCEDURE — 4040F PNEUMOC VAC/ADMIN/RCVD: CPT | Performed by: FAMILY MEDICINE

## 2019-03-19 PROCEDURE — G8427 DOCREV CUR MEDS BY ELIG CLIN: HCPCS | Performed by: FAMILY MEDICINE

## 2019-03-19 PROCEDURE — G8483 FLU IMM NO ADMIN DOC REA: HCPCS | Performed by: FAMILY MEDICINE

## 2019-03-19 RX ORDER — AZITHROMYCIN 250 MG/1
TABLET, FILM COATED ORAL
Qty: 6 TABLET | Refills: 0 | Status: SHIPPED | OUTPATIENT
Start: 2019-03-19 | End: 2020-02-27 | Stop reason: ALTCHOICE

## 2019-03-19 ASSESSMENT — PATIENT HEALTH QUESTIONNAIRE - PHQ9
SUM OF ALL RESPONSES TO PHQ QUESTIONS 1-9: 0
1. LITTLE INTEREST OR PLEASURE IN DOING THINGS: 0
SUM OF ALL RESPONSES TO PHQ9 QUESTIONS 1 & 2: 0
SUM OF ALL RESPONSES TO PHQ QUESTIONS 1-9: 0
2. FEELING DOWN, DEPRESSED OR HOPELESS: 0

## 2019-03-19 ASSESSMENT — ENCOUNTER SYMPTOMS
SINUS PRESSURE: 1
CONSTIPATION: 0
DIARRHEA: 0
NAUSEA: 0
ABDOMINAL PAIN: 0
COUGH: 0
SHORTNESS OF BREATH: 0
EYES NEGATIVE: 1

## 2019-04-03 RX ORDER — MECLIZINE HYDROCHLORIDE 25 MG/1
25 TABLET ORAL 3 TIMES DAILY PRN
Qty: 90 TABLET | Refills: 2 | Status: SHIPPED | OUTPATIENT
Start: 2019-04-03 | End: 2021-11-30

## 2020-02-25 ENCOUNTER — OFFICE VISIT (OUTPATIENT)
Dept: CARDIOLOGY CLINIC | Age: 85
End: 2020-02-25
Payer: MEDICARE

## 2020-02-25 PROCEDURE — 93000 ELECTROCARDIOGRAM COMPLETE: CPT | Performed by: INTERNAL MEDICINE

## 2020-02-27 ENCOUNTER — OFFICE VISIT (OUTPATIENT)
Dept: CARDIOLOGY CLINIC | Age: 85
End: 2020-02-27
Payer: MEDICARE

## 2020-02-27 VITALS
SYSTOLIC BLOOD PRESSURE: 130 MMHG | WEIGHT: 208 LBS | BODY MASS INDEX: 29.84 KG/M2 | HEART RATE: 61 BPM | DIASTOLIC BLOOD PRESSURE: 80 MMHG

## 2020-02-27 PROCEDURE — G8483 FLU IMM NO ADMIN DOC REA: HCPCS | Performed by: INTERNAL MEDICINE

## 2020-02-27 PROCEDURE — 99204 OFFICE O/P NEW MOD 45 MIN: CPT | Performed by: INTERNAL MEDICINE

## 2020-02-27 PROCEDURE — 4040F PNEUMOC VAC/ADMIN/RCVD: CPT | Performed by: INTERNAL MEDICINE

## 2020-02-27 PROCEDURE — 1036F TOBACCO NON-USER: CPT | Performed by: INTERNAL MEDICINE

## 2020-02-27 PROCEDURE — G8427 DOCREV CUR MEDS BY ELIG CLIN: HCPCS | Performed by: INTERNAL MEDICINE

## 2020-02-27 PROCEDURE — G8417 CALC BMI ABV UP PARAM F/U: HCPCS | Performed by: INTERNAL MEDICINE

## 2020-02-27 PROCEDURE — 1123F ACP DISCUSS/DSCN MKR DOCD: CPT | Performed by: INTERNAL MEDICINE

## 2020-02-27 RX ORDER — LISINOPRIL 10 MG/1
10 TABLET ORAL DAILY
COMMUNITY
End: 2020-06-24 | Stop reason: ALTCHOICE

## 2020-02-27 RX ORDER — PANTOPRAZOLE SODIUM 40 MG/1
40 TABLET, DELAYED RELEASE ORAL DAILY
COMMUNITY
End: 2020-06-24 | Stop reason: ALTCHOICE

## 2020-02-27 NOTE — PROGRESS NOTES
Subjective:     Maryuri Funes is a 81 y.o. male who present today with:    Chief Complaint   Patient presents with    Establish Cardiologist     DR SHINE REFERRING /OLD PATIENT OF DR Alecia DUQUE [address]       [facility]    2-1-17 with Aiyana Akins: Patient here for hospital follow-up from recent admission to [facility] for CVA and ? new onset atrial fibrillation. Reportedly when patient initially presented to [address] [place] [address] ER he was noted to be in atrial fibrillation; however, upon review of cardiology consult done at [facility] patient's initial rhythm appeared to be SR with PACs. EKG from [address] [place] [address] unavailable to me at this time. Had bilateral carotid US from 1/7/17 revealed 50-69% bilateral ICA stenosis. MRI brain 1/7/17 showed there is a focal area of diffusion restriction in the right periventricular deep white matter consistent with an acute ischemic nonhemorrhagic infarct. Was later discharge to [facility] rehab facility and discharged from there after 1.5 weeks of rehab. Has been doing well since discharge with no complaints overall other than occasionally lower BP with lowest BP reading of 108/71. Patient has occasionally not been taking his Toprol XL 50mg PO daily and amlodipine 5mg PO daily if his SBP isn't above 140-160 range. Will experience dizziness and lightheadedness occasionally when SBP in low 100s. Denies chest pain/SOB/orthopnea/syncope/fever/chills. Has been compliant with xarelto. No bleeding issues. 3-2-17: s/p normal 48 hour holter monitor. States his BP is 160-170 in am and PM. Pt denies chest pain, dyspnea, dyspnea on exertion, change in exercise capacity, fatigue,  nausea, vomiting, diarrhea, constipation, motor weakness, insomnia, weight loss, syncope, dizziness, lightheadedness, palpitations, PND, orthopnea, or claudication. No nitro use. BP and hr are good. CAD is stable. No LE discoloration or ulcers. No LE edema. No CHF type symptoms.  Lipid profile is normal.     S/p LOUISA in condition explained and patient was warned about adverse consequences of uncontrolled medical conditions and possible side effects of prescribed medications. Follow up:  Return in about 3 weeks (around 3/19/2020).     Lamont Chapman, DO

## 2020-05-19 ENCOUNTER — HOSPITAL ENCOUNTER (OUTPATIENT)
Dept: NON INVASIVE DIAGNOSTICS | Age: 85
End: 2020-05-19
Payer: MEDICARE

## 2020-06-16 ENCOUNTER — HOSPITAL ENCOUNTER (OUTPATIENT)
Dept: NON INVASIVE DIAGNOSTICS | Age: 85
Discharge: HOME | End: 2020-06-16
Payer: MEDICARE

## 2020-06-16 ENCOUNTER — HOSPITAL ENCOUNTER (OUTPATIENT)
Dept: ULTRASOUND IMAGING | Age: 85
Discharge: HOME | End: 2020-06-18
Payer: MEDICARE

## 2020-06-16 LAB
LV EF: 60 %
LVEF MODALITY: NORMAL

## 2020-06-16 PROCEDURE — 93880 EXTRACRANIAL BILAT STUDY: CPT

## 2020-06-16 PROCEDURE — 93306 TTE W/DOPPLER COMPLETE: CPT

## 2020-06-16 PROCEDURE — 93880 EXTRACRANIAL BILAT STUDY: CPT | Performed by: INTERNAL MEDICINE

## 2020-06-24 RX ORDER — METOPROLOL SUCCINATE 25 MG/1
25 TABLET, EXTENDED RELEASE ORAL DAILY
COMMUNITY
End: 2021-11-30

## 2020-06-24 RX ORDER — DICLOFENAC SODIUM 75 MG/1
75 TABLET, DELAYED RELEASE ORAL 2 TIMES DAILY
COMMUNITY

## 2020-06-24 RX ORDER — ATORVASTATIN CALCIUM 20 MG/1
20 TABLET, FILM COATED ORAL NIGHTLY
COMMUNITY
End: 2021-03-09 | Stop reason: SINTOL

## 2020-06-26 ENCOUNTER — VIRTUAL VISIT (OUTPATIENT)
Dept: CARDIOLOGY CLINIC | Age: 85
End: 2020-06-26
Payer: MEDICARE

## 2020-06-26 PROCEDURE — 99442 PR PHYS/QHP TELEPHONE EVALUATION 11-20 MIN: CPT | Performed by: INTERNAL MEDICINE

## 2020-06-26 ASSESSMENT — ENCOUNTER SYMPTOMS
ABDOMINAL PAIN: 0
VOMITING: 0
NAUSEA: 0
SHORTNESS OF BREATH: 0
ALLERGIC/IMMUNOLOGIC NEGATIVE: 1
GASTROINTESTINAL NEGATIVE: 1
EYES NEGATIVE: 1
WHEEZING: 0

## 2020-06-26 NOTE — PROGRESS NOTES
6/26/2020    TELEHEALTH EVALUATION -- Audio/Visual (During GBKGW-20 public health emergency)    Due to Alie 19 outbreak, patient's office visit was converted to a virtual visit. Patient was contacted and agreed to proceed with a virtual visit via Telephone Visit  The risks and benefits of converting to a virtual visit were discussed in light of the current infectious disease epidemic. Patient also understood that insurance coverage and co-pays are up to their individual insurance plans. HPI:      HPI     2-1-17 with Kaila: Patient here for hospital follow-up from recent admission to [facility] for CVA and ? new onset atrial fibrillation. Reportedly when patient initially presented to [address] [place] [address] ER he was noted to be in atrial fibrillation; however, upon review of cardiology consult done at [facility] patient's initial rhythm appeared to be SR with PACs. EKG from [address] [place] [address] unavailable to me at this time. Had bilateral carotid US from 1/7/17 revealed 50-69% bilateral ICA stenosis. MRI brain 1/7/17 showed there is a focal area of diffusion restriction in the right periventricular deep white matter consistent with an acute ischemic nonhemorrhagic infarct. Was later discharge to [facility] rehab facility and discharged from there after 1.5 weeks of rehab.     Has been doing well since discharge with no complaints overall other than occasionally lower BP with lowest BP reading of 108/71. Patient has occasionally not been taking his Toprol XL 50mg PO daily and amlodipine 5mg PO daily if his SBP isn't above 140-160 range. Will experience dizziness and lightheadedness occasionally when SBP in low 100s. Denies chest pain/SOB/orthopnea/syncope/fever/chills. Has been compliant with xarelto. No bleeding issues.      3-2-17: s/p normal 48 hour holter monitor.  States his BP is 160-170 in am and PM. Pt denies chest pain, dyspnea, dyspnea on exertion, change in exercise capacity, fatigue,  nausea, vomiting, diarrhea, constipation, motor weakness, insomnia, weight loss, syncope, dizziness, lightheadedness, palpitations, PND, orthopnea, or claudication. No nitro use. BP and hr are good. CAD is stable. No LE discoloration or ulcers. No LE edema. No CHF type symptoms. Lipid profile is normal.      S/p LOUISA in 1/2017:  Conclusions   Summary   No mass, thrombus or vegetation.   Mild (1+) mitral regurgitation is present.   Mild aortic regurgitation is noted.   No evidence of aortic stenosis.   Tricuspid valve is structurally normal.   Mildly dilated LA   No evidence of left to right shunt with color flow doppler or right to   left shunt with agitated saline contrast study.   Normal left ventricle structure and function.   Normal right ventricle structure and function.   Normal right ventricle systolic pressure.   The aorta is within normal limits.   Plaque noted in the descending aorta   No evidence of pericardial effusion.     S/p ECHO in 1/2017: Summary   Diffusely thickened and pliable mitral valve leaflets with normal   excursion.   No evidence of mitral regurgitation.   No evidence of mitral valve stenosis   Sclerotic, trileaflet aortic valve with diffusely thickened leaflets with   normal cusp separation and excursion.   No evidence of aortic valve regurgitation   No evidence of aortic valve stenosis   Normal left atrium.   No left to right shunt with color flow doppler or right to left shunt with   agitated saline contrast study.   Normal left ventricular systolic function, no regional wall motion   abnormalities, estimated ejection fraction of 60%   Normal left ventricular size and function.   Normal left ventricular wall thickness.   Impaired relaxation compatible with diastolic dysfunction.  ( reversed E/A   ratio)   Normal right ventricle structure and function.   Normal right ventricle systolic pressure.   Miscellaneous normal findings were found.   No evidence of pericardial effusion.        3-30-17: s/p event monitor with

## 2021-03-09 ENCOUNTER — OFFICE VISIT (OUTPATIENT)
Dept: CARDIOLOGY CLINIC | Age: 86
End: 2021-03-09
Payer: MEDICARE

## 2021-03-09 VITALS
HEART RATE: 59 BPM | WEIGHT: 210 LBS | BODY MASS INDEX: 30.13 KG/M2 | DIASTOLIC BLOOD PRESSURE: 80 MMHG | TEMPERATURE: 98.1 F | SYSTOLIC BLOOD PRESSURE: 120 MMHG

## 2021-03-09 DIAGNOSIS — I10 ESSENTIAL HYPERTENSION: ICD-10-CM

## 2021-03-09 DIAGNOSIS — Z86.79 HISTORY OF ATRIAL FIBRILLATION: ICD-10-CM

## 2021-03-09 DIAGNOSIS — I48.0 AF (PAROXYSMAL ATRIAL FIBRILLATION) (HCC): Primary | ICD-10-CM

## 2021-03-09 DIAGNOSIS — R06.02 SHORTNESS OF BREATH: ICD-10-CM

## 2021-03-09 PROCEDURE — 93000 ELECTROCARDIOGRAM COMPLETE: CPT | Performed by: INTERNAL MEDICINE

## 2021-03-09 PROCEDURE — G8427 DOCREV CUR MEDS BY ELIG CLIN: HCPCS | Performed by: INTERNAL MEDICINE

## 2021-03-09 PROCEDURE — 1123F ACP DISCUSS/DSCN MKR DOCD: CPT | Performed by: INTERNAL MEDICINE

## 2021-03-09 PROCEDURE — 4040F PNEUMOC VAC/ADMIN/RCVD: CPT | Performed by: INTERNAL MEDICINE

## 2021-03-09 PROCEDURE — 1036F TOBACCO NON-USER: CPT | Performed by: INTERNAL MEDICINE

## 2021-03-09 PROCEDURE — G8483 FLU IMM NO ADMIN DOC REA: HCPCS | Performed by: INTERNAL MEDICINE

## 2021-03-09 PROCEDURE — 99214 OFFICE O/P EST MOD 30 MIN: CPT | Performed by: INTERNAL MEDICINE

## 2021-03-09 PROCEDURE — G8417 CALC BMI ABV UP PARAM F/U: HCPCS | Performed by: INTERNAL MEDICINE

## 2021-03-09 ASSESSMENT — ENCOUNTER SYMPTOMS
NAUSEA: 0
GASTROINTESTINAL NEGATIVE: 1
SHORTNESS OF BREATH: 0
WHEEZING: 0
EYES NEGATIVE: 1
ABDOMINAL PAIN: 0
ALLERGIC/IMMUNOLOGIC NEGATIVE: 1
VOMITING: 0

## 2021-03-09 NOTE — PROGRESS NOTES
3/9/2021      HPI:      HPI     2-1-17 with Kaila: Patient here for hospital follow-up from recent admission to [facility] for CVA and ? new onset atrial fibrillation. Reportedly when patient initially presented to [address] [place] [address] ER he was noted to be in atrial fibrillation; however, upon review of cardiology consult done at [facility] patient's initial rhythm appeared to be SR with PACs. EKG from [address] [place] [address] unavailable to me at this time. Had bilateral carotid US from 1/7/17 revealed 50-69% bilateral ICA stenosis. MRI brain 1/7/17 showed there is a focal area of diffusion restriction in the right periventricular deep white matter consistent with an acute ischemic nonhemorrhagic infarct. Was later discharge to [facility] rehab facility and discharged from there after 1.5 weeks of rehab.     Has been doing well since discharge with no complaints overall other than occasionally lower BP with lowest BP reading of 108/71. Patient has occasionally not been taking his Toprol XL 50mg PO daily and amlodipine 5mg PO daily if his SBP isn't above 140-160 range. Will experience dizziness and lightheadedness occasionally when SBP in low 100s. Denies chest pain/SOB/orthopnea/syncope/fever/chills. Has been compliant with xarelto. No bleeding issues.      3-2-17: s/p normal 48 hour holter monitor. States his BP is 160-170 in am and PM. Pt denies chest pain, dyspnea, dyspnea on exertion, change in exercise capacity, fatigue,  nausea, vomiting, diarrhea, constipation, motor weakness, insomnia, weight loss, syncope, dizziness, lightheadedness, palpitations, PND, orthopnea, or claudication. No nitro use. BP and hr are good. CAD is stable. No LE discoloration or ulcers. No LE edema. No CHF type symptoms.  Lipid profile is normal.      S/p LOUISA in 1/2017:  Conclusions   Summary   No mass, thrombus or vegetation.   Mild (1+) mitral regurgitation is present.   Mild aortic regurgitation is noted.   No evidence of aortic stenosis.   Tricuspid valve is structurally normal.   Mildly dilated LA   No evidence of left to right shunt with color flow doppler or right to   left shunt with agitated saline contrast study.   Normal left ventricle structure and function.   Normal right ventricle structure and function.   Normal right ventricle systolic pressure.   The aorta is within normal limits.   Plaque noted in the descending aorta   No evidence of pericardial effusion.     S/p ECHO in 1/2017: Summary   Diffusely thickened and pliable mitral valve leaflets with normal   excursion.   No evidence of mitral regurgitation.   No evidence of mitral valve stenosis   Sclerotic, trileaflet aortic valve with diffusely thickened leaflets with   normal cusp separation and excursion.   No evidence of aortic valve regurgitation   No evidence of aortic valve stenosis   Normal left atrium.   No left to right shunt with color flow doppler or right to left shunt with   agitated saline contrast study.   Normal left ventricular systolic function, no regional wall motion   abnormalities, estimated ejection fraction of 60%   Normal left ventricular size and function.   Normal left ventricular wall thickness.   Impaired relaxation compatible with diastolic dysfunction. ( reversed E/A   ratio)   Normal right ventricle structure and function.   Normal right ventricle systolic pressure.   Miscellaneous normal findings were found.   No evidence of pericardial effusion.        3-30-17: s/p event monitor with no evidence of afibb for 2 weeks.         2-27-20: HAS NOT BEEN seen in 3 yrs. EKG today with NSR. Pt denies chest pain, dyspnea, dyspnea on exertion, change in exercise capacity, fatigue,  nausea, vomiting, diarrhea, constipation, motor weakness, insomnia, weight loss, syncope, dizziness, lightheadedness, palpitations, PND, orthopnea, or claudication. No nitro use. BP and hr are good. CAD is stable. No LE discoloration or ulcers. No LE edema.  No CHF type symptoms. Lipid profile is normal. No recent hospitalization. No change in meds. Has hx of CUS with 50-69% stenosis in 2017.      20:   Nahid Lima (:  1935) has requested an audio/video evaluation for the following concern(s):    That is post carotid ultrasound with less than 50% bilateral stenosis. Status post echo ejection fraction 60%, mild LVH, grade 1 diastolic dysfunction trace MR. Following with urology for history of prostate cancer. Pt denies chest pain, dyspnea, dyspnea on exertion, change in exercise capacity, fatigue,  nausea, vomiting, diarrhea, constipation, motor weakness, insomnia, weight loss, syncope, dizziness, lightheadedness, palpitations, PND, orthopnea, or claudication. 3/9/2021: Patient was on Lupron shots for prostate cancer. Has been short of breath since his Lupron shots. States he has to take a break with walking to his mailbox. Pt denies chest pain,nausea, vomiting, diarrhea, constipation, motor weakness, insomnia, weight loss, syncope, dizziness, lightheadedness, palpitations, PND, orthopnea, or claudication. EKG with normal sinus rhythm positive PAC/PVC. States he had recent blood work which was okay. Status post carotid ultrasound in  with 50% bilateral stenosis. Last echo with normal LV function trace MR, mild MR. Did not tolerate statin. He is off of Lipitor now. Review of Systems   Constitutional: Negative. Negative for chills and fever. HENT: Negative. Eyes: Negative. Respiratory: Negative for shortness of breath and wheezing. Cardiovascular: Negative for chest pain, palpitations and leg swelling. Gastrointestinal: Negative. Negative for abdominal pain, nausea and vomiting. Endocrine: Negative. Genitourinary: Negative. Musculoskeletal: Negative. Skin: Negative. Negative for rash. Allergic/Immunologic: Negative. Neurological: Negative for dizziness, weakness and headaches. Hematological: Negative. Psychiatric/Behavioral: Negative. Prior to Visit Medications    Medication Sig Taking?  Authorizing Provider   Misc Natural Products (URINOZINC PO) Take 2 tablets by mouth daily Yes Historical Provider, MD   metoprolol succinate (TOPROL XL) 25 MG extended release tablet Take 25 mg by mouth daily Yes Historical Provider, MD   diclofenac (VOLTAREN) 75 MG EC tablet Take 75 mg by mouth 2 times daily Yes Historical Provider, MD   meclizine (ANTIVERT) 25 MG tablet TAKE 1 TABLET BY MOUTH 3  TIMES DAILY AS NEEDED FOR  DIZZINESS Yes Dalkali Torres Chetan Tatum APRN - [facility]   amLODIPine (NORVASC) 2.5 MG tablet TAKE 1 TABLET BY MOUTH  DAILY Yes ZOHREH Diaz - CNP   Mirabegron (MYRBETRIQ PO) Take 25 mg by mouth daily as needed  Yes Historical Provider, MD   tamsulosin (FLOMAX) 0.4 MG capsule TAKE 1 CAPSULE DAILY Yes Lauren Maza MD   aspirin 81 MG chewable tablet Take 1 tablet by mouth daily Yes Jeni Goldberg APRN - CNP       Social History     Tobacco Use    Smoking status: Former Smoker     Packs/day: 3.00     Types: Cigarettes    Smokeless tobacco: Never Used   Substance Use Topics    Alcohol use: No    Drug use: No        Allergies   Allergen Reactions    Oxycodone Hcl Anaphylaxis and Other (See Comments)    Betamethasone Dipropionate Other (See Comments)     Regular steroids cause dim vision    Cortisone      Blurred vision    Pcn [Penicillins] Hives    Morphine And Related Nausea And Vomiting   ,   Past Medical History:   Diagnosis Date    Anxiety     Bilateral carotid artery disease ([address] 3/30/2017    BPH (benign prostatic hypertrophy)     History of atrial fibrillation 3/30/2017    Hypertension     Low testosterone     Osteoarthritis     Shortness of breath 3/9/2021    Vertigo    ,   Past Surgical History:   Procedure Laterality Date    JOINT REPLACEMENT Left 10/11/2016    total    KIDNEY STONE SURGERY     ,   Family History   Problem Relation Age of Onset    Emphysema Father        Physical Exam   Constitutional: He is oriented to person, place, and time. He appears well-developed and well-nourished. HENT:   Head: Normocephalic and atraumatic. Neck: Normal range of motion. Neck supple. No JVD present. No tracheal deviation present. No thyromegaly present. Cardiovascular: Normal rate and regular rhythm. Murmur heard. Pulmonary/Chest: Effort normal and breath sounds normal. No respiratory distress. He has no wheezes. He has no rales. Abdominal: Soft. Bowel sounds are normal. He exhibits no distension. There is no tenderness. There is no rebound. Musculoskeletal: Normal range of motion. He exhibits no edema. Neurological: He is alert and oriented to person, place, and time. No cranial nerve deficit. Skin: Skin is warm and dry. No rash noted. No erythema. Psychiatric: He has a normal mood and affect. His behavior is normal.     ASSESSMENT:        Diagnosis Orders   1. AF (paroxysmal atrial fibrillation) ([facility])  EKG 12 Lead   2. Essential hypertension     3. History of atrial fibrillation     4. Shortness of breath     5. FATIGUE. PLAN:       stress test GIVEN SOB     Check EKG     STOP lOPRESSOR TO SEE IF ITS CAUSE OF HIS FATIGUE    CHECK LABS WITH PCP- ? ANEMIA.      Patient was advised and encouraged to check blood pressure at home or at a pharmacy, maintain a logbook, and also call us back if blood pressure are above the target ranges or if it is low. Patient clearly understands and agrees to the instructions.      We will need to continue to monitor muscle and liver enzymes, BUN, CR, and electrolytes.     Details of medical condition explained and patient was warned about adverse consequences of uncontrolled medical conditions and possible side effects of prescribed medications. No follow-ups on file. An  electronic signature was used to authenticate this note.     --Stanislaw Garcia, DO on 3/9/2021 at 12:42 PM  9}

## 2021-04-08 ENCOUNTER — APPOINTMENT (OUTPATIENT)
Dept: NUCLEAR MEDICINE | Age: 86
End: 2021-04-08
Payer: MEDICARE

## 2021-04-08 ENCOUNTER — APPOINTMENT (OUTPATIENT)
Dept: NON INVASIVE DIAGNOSTICS | Age: 86
End: 2021-04-08
Payer: MEDICARE

## 2021-05-11 ENCOUNTER — HOSPITAL ENCOUNTER (OUTPATIENT)
Dept: NON INVASIVE DIAGNOSTICS | Age: 86
Discharge: HOME | End: 2021-05-11
Payer: MEDICARE

## 2021-05-11 ENCOUNTER — HOSPITAL ENCOUNTER (OUTPATIENT)
Dept: NUCLEAR MEDICINE | Age: 86
Discharge: HOME | End: 2021-05-13
Payer: MEDICARE

## 2021-05-11 DIAGNOSIS — R06.02 SHORTNESS OF BREATH: ICD-10-CM

## 2021-05-11 PROCEDURE — 93017 CV STRESS TEST TRACING ONLY: CPT

## 2021-05-11 PROCEDURE — 3430000000 HC RX DIAGNOSTIC RADIOPHARMACEUTICAL: Performed by: INTERNAL MEDICINE

## 2021-05-11 PROCEDURE — 2580000003 HC RX 258: Performed by: INTERNAL MEDICINE

## 2021-05-11 PROCEDURE — A9502 TC99M TETROFOSMIN: HCPCS | Performed by: INTERNAL MEDICINE

## 2021-05-11 PROCEDURE — 6360000002 HC RX W HCPCS: Performed by: INTERNAL MEDICINE

## 2021-05-11 PROCEDURE — 78452 HT MUSCLE IMAGE SPECT MULT: CPT

## 2021-05-11 RX ORDER — SODIUM CHLORIDE 0.9 % (FLUSH) 0.9 %
10 SYRINGE (ML) INJECTION PRN
Status: COMPLETED | OUTPATIENT
Start: 2021-05-11 | End: 2021-05-11

## 2021-05-11 RX ADMIN — TETROFOSMIN 35.1 MILLICURIE: 1.38 INJECTION, POWDER, LYOPHILIZED, FOR SOLUTION INTRAVENOUS at 10:59

## 2021-05-11 RX ADMIN — Medication 10 ML: at 10:59

## 2021-05-11 RX ADMIN — REGADENOSON 0.4 MG: 0.08 INJECTION, SOLUTION INTRAVENOUS at 10:59

## 2021-05-11 RX ADMIN — Medication 10 ML: at 11:00

## 2021-05-11 RX ADMIN — Medication 10 ML: at 09:45

## 2021-05-11 RX ADMIN — TETROFOSMIN 10.5 MILLICURIE: 1.38 INJECTION, POWDER, LYOPHILIZED, FOR SOLUTION INTRAVENOUS at 09:45

## 2021-05-11 NOTE — PROGRESS NOTES
Reviewed history, allergies, and medications. Patient held his medications prior to testing. Consent confirmed. Lexiscan exam explained. Placed patient on monitor. 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 SOB noted during recovery phase. Denied chest pain. Frequent pvc's noted. Patient off monitor and instructed to eat, will have last part of exam in 1 hour.

## 2021-05-17 ENCOUNTER — TELEPHONE (OUTPATIENT)
Dept: CARDIOLOGY CLINIC | Age: 86
End: 2021-05-17

## 2021-09-29 ENCOUNTER — HOSPITAL ENCOUNTER (OUTPATIENT)
Dept: NUCLEAR MEDICINE | Age: 86
Discharge: HOME | End: 2021-10-01
Payer: MEDICARE

## 2021-09-29 DIAGNOSIS — C61 MALIGNANT NEOPLASM OF PROSTATE (HCC): ICD-10-CM

## 2021-09-29 PROCEDURE — A9503 TC99M MEDRONATE: HCPCS | Performed by: UROLOGY

## 2021-09-29 PROCEDURE — 78306 BONE IMAGING WHOLE BODY: CPT

## 2021-09-29 PROCEDURE — 3430000000 HC RX DIAGNOSTIC RADIOPHARMACEUTICAL: Performed by: UROLOGY

## 2021-09-29 RX ORDER — TC 99M MEDRONATE 20 MG/10ML
25 INJECTION, POWDER, LYOPHILIZED, FOR SOLUTION INTRAVENOUS
Status: COMPLETED | OUTPATIENT
Start: 2021-09-29 | End: 2021-09-29

## 2021-09-29 RX ADMIN — TC 99M MEDRONATE 29.8 MILLICURIE: 20 INJECTION, POWDER, LYOPHILIZED, FOR SOLUTION INTRAVENOUS at 10:46

## 2021-11-30 ENCOUNTER — OFFICE VISIT (OUTPATIENT)
Dept: CARDIOLOGY CLINIC | Age: 86
End: 2021-11-30
Payer: MEDICARE

## 2021-11-30 VITALS
HEART RATE: 67 BPM | SYSTOLIC BLOOD PRESSURE: 130 MMHG | BODY MASS INDEX: 30.42 KG/M2 | DIASTOLIC BLOOD PRESSURE: 80 MMHG | WEIGHT: 212 LBS | TEMPERATURE: 97.2 F

## 2021-11-30 DIAGNOSIS — R06.02 SHORTNESS OF BREATH: ICD-10-CM

## 2021-11-30 DIAGNOSIS — I48.0 AF (PAROXYSMAL ATRIAL FIBRILLATION) (HCC): Primary | ICD-10-CM

## 2021-11-30 DIAGNOSIS — I10 ESSENTIAL HYPERTENSION: ICD-10-CM

## 2021-11-30 DIAGNOSIS — I77.9 BILATERAL CAROTID ARTERY DISEASE, UNSPECIFIED TYPE (HCC): ICD-10-CM

## 2021-11-30 DIAGNOSIS — Z86.79 HISTORY OF ATRIAL FIBRILLATION: ICD-10-CM

## 2021-11-30 PROCEDURE — 93000 ELECTROCARDIOGRAM COMPLETE: CPT | Performed by: INTERNAL MEDICINE

## 2021-11-30 PROCEDURE — G8417 CALC BMI ABV UP PARAM F/U: HCPCS | Performed by: INTERNAL MEDICINE

## 2021-11-30 PROCEDURE — 99214 OFFICE O/P EST MOD 30 MIN: CPT | Performed by: INTERNAL MEDICINE

## 2021-11-30 PROCEDURE — 4004F PT TOBACCO SCREEN RCVD TLK: CPT | Performed by: INTERNAL MEDICINE

## 2021-11-30 PROCEDURE — 1123F ACP DISCUSS/DSCN MKR DOCD: CPT | Performed by: INTERNAL MEDICINE

## 2021-11-30 PROCEDURE — 4040F PNEUMOC VAC/ADMIN/RCVD: CPT | Performed by: INTERNAL MEDICINE

## 2021-11-30 PROCEDURE — G8427 DOCREV CUR MEDS BY ELIG CLIN: HCPCS | Performed by: INTERNAL MEDICINE

## 2021-11-30 PROCEDURE — G8483 FLU IMM NO ADMIN DOC REA: HCPCS | Performed by: INTERNAL MEDICINE

## 2021-11-30 RX ORDER — BICALUTAMIDE 50 MG/1
50 TABLET, FILM COATED ORAL DAILY
COMMUNITY
End: 2022-07-07 | Stop reason: ALTCHOICE

## 2021-11-30 RX ORDER — FINASTERIDE 5 MG/1
5 TABLET, FILM COATED ORAL EVERY OTHER DAY
COMMUNITY

## 2021-11-30 ASSESSMENT — ENCOUNTER SYMPTOMS
VOMITING: 0
ABDOMINAL PAIN: 0
WHEEZING: 0
NAUSEA: 0
ALLERGIC/IMMUNOLOGIC NEGATIVE: 1
EYES NEGATIVE: 1
GASTROINTESTINAL NEGATIVE: 1
SHORTNESS OF BREATH: 0

## 2021-11-30 NOTE — PROGRESS NOTES
11/30/2021      HPI:      HPI     2-1-17 with Kaila: Patient here for hospital follow-up from recent admission to [facility] for CVA and ? new onset atrial fibrillation. Reportedly when patient initially presented to [address] [place] [address] ER he was noted to be in atrial fibrillation; however, upon review of cardiology consult done at [facility] patient's initial rhythm appeared to be SR with PACs. EKG from [address] [place] [address] unavailable to me at this time. Had bilateral carotid US from 1/7/17 revealed 50-69% bilateral ICA stenosis. MRI brain 1/7/17 showed there is a focal area of diffusion restriction in the right periventricular deep white matter consistent with an acute ischemic nonhemorrhagic infarct. Was later discharge to [facility] rehab facility and discharged from there after 1.5 weeks of rehab.     Has been doing well since discharge with no complaints overall other than occasionally lower BP with lowest BP reading of 108/71. Patient has occasionally not been taking his Toprol XL 50mg PO daily and amlodipine 5mg PO daily if his SBP isn't above 140-160 range. Will experience dizziness and lightheadedness occasionally when SBP in low 100s. Denies chest pain/SOB/orthopnea/syncope/fever/chills. Has been compliant with xarelto. No bleeding issues.      3-2-17: s/p normal 48 hour holter monitor. States his BP is 160-170 in am and PM. Pt denies chest pain, dyspnea, dyspnea on exertion, change in exercise capacity, fatigue,  nausea, vomiting, diarrhea, constipation, motor weakness, insomnia, weight loss, syncope, dizziness, lightheadedness, palpitations, PND, orthopnea, or claudication. No nitro use. BP and hr are good. CAD is stable. No LE discoloration or ulcers. No LE edema. No CHF type symptoms.  Lipid profile is normal.      S/p LOUISA in 1/2017:  Conclusions   Summary   No mass, thrombus or vegetation.   Mild (1+) mitral regurgitation is present.   Mild aortic regurgitation is noted.   No evidence of aortic stenosis.   Tricuspid valve is structurally normal.   Mildly dilated LA   No evidence of left to right shunt with color flow doppler or right to   left shunt with agitated saline contrast study.   Normal left ventricle structure and function.   Normal right ventricle structure and function.   Normal right ventricle systolic pressure.   The aorta is within normal limits.   Plaque noted in the descending aorta   No evidence of pericardial effusion.     S/p ECHO in 1/2017: Summary   Diffusely thickened and pliable mitral valve leaflets with normal   excursion.   No evidence of mitral regurgitation.   No evidence of mitral valve stenosis   Sclerotic, trileaflet aortic valve with diffusely thickened leaflets with   normal cusp separation and excursion.   No evidence of aortic valve regurgitation   No evidence of aortic valve stenosis   Normal left atrium.   No left to right shunt with color flow doppler or right to left shunt with   agitated saline contrast study.   Normal left ventricular systolic function, no regional wall motion   abnormalities, estimated ejection fraction of 60%   Normal left ventricular size and function.   Normal left ventricular wall thickness.   Impaired relaxation compatible with diastolic dysfunction. ( reversed E/A   ratio)   Normal right ventricle structure and function.   Normal right ventricle systolic pressure.   Miscellaneous normal findings were found.   No evidence of pericardial effusion.        3-30-17: s/p event monitor with no evidence of afibb for 2 weeks.         2-27-20: HAS NOT BEEN seen in 3 yrs. EKG today with NSR. Pt denies chest pain, dyspnea, dyspnea on exertion, change in exercise capacity, fatigue,  nausea, vomiting, diarrhea, constipation, motor weakness, insomnia, weight loss, syncope, dizziness, lightheadedness, palpitations, PND, orthopnea, or claudication. No nitro use. BP and hr are good. CAD is stable. No LE discoloration or ulcers. No LE edema.  No CHF type symptoms. Lipid profile is normal. No recent hospitalization. No change in meds. Has hx of CUS with 50-69% stenosis in 2017.      20:   Kristen Subramanian (:  1935) has requested an audio/video evaluation for the following concern(s):    That is post carotid ultrasound with less than 50% bilateral stenosis. Status post echo ejection fraction 60%, mild LVH, grade 1 diastolic dysfunction trace MR. Following with urology for history of prostate cancer. Pt denies chest pain, dyspnea, dyspnea on exertion, change in exercise capacity, fatigue,  nausea, vomiting, diarrhea, constipation, motor weakness, insomnia, weight loss, syncope, dizziness, lightheadedness, palpitations, PND, orthopnea, or claudication. 3/9/2021: Patient was on Lupron shots for prostate cancer. Has been short of breath since his Lupron shots. States he has to take a break with walking to his mailbox. Pt denies chest pain,nausea, vomiting, diarrhea, constipation, motor weakness, insomnia, weight loss, syncope, dizziness, lightheadedness, palpitations, PND, orthopnea, or claudication. EKG with normal sinus rhythm positive PAC/PVC. States he had recent blood work which was okay. Status post carotid ultrasound in  with 50% bilateral stenosis. Last echo with normal LV function trace MR, mild MR. Did not tolerate statin. He is off of Lipitor now. 21: hx of prostate cancer. On lupron. Doing ok overall hx of irregular heart beat, PVC'PAC's. Pt denies chest pain,dyspnea on exertion, change in exercise capacity, fatigue,  nausea, vomiting, diarrhea, constipation, motor weakness, insomnia, weight loss, syncope, dizziness, lightheadedness, palpitations, PND, orthopnea, or claudication. Does have SOB  Status post carotid ultrasound in  with 50% bilateral stenosis. Last echo with normal LV function trace MR, mild MR. S/p normal nuclear stress test in 2021.    EKG with NSR, PVC      Review of Systems   Constitutional: Negative. Negative for chills and fever. HENT: Negative. Eyes: Negative. Respiratory: Negative for shortness of breath and wheezing. Cardiovascular: Negative for chest pain, palpitations and leg swelling. Gastrointestinal: Negative. Negative for abdominal pain, nausea and vomiting. Endocrine: Negative. Genitourinary: Negative. Musculoskeletal: Negative. Skin: Negative. Negative for rash. Allergic/Immunologic: Negative. Neurological: Negative for dizziness, weakness and headaches. Hematological: Negative. Psychiatric/Behavioral: Negative. Prior to Visit Medications    Medication Sig Taking?  Authorizing Provider   finasteride (PROSCAR) 5 MG tablet Take 5 mg by mouth daily Yes Historical Provider, MD   bicalutamide (CASODEX) 50 MG chemo tablet Take 50 mg by mouth daily Yes Historical Provider, MD   diclofenac (VOLTAREN) 75 MG EC tablet Take 75 mg by mouth 2 times daily Yes Historical Provider, MD   amLODIPine (NORVASC) 2.5 MG tablet TAKE 1 TABLET BY MOUTH  DAILY Yes ZOHREH Bravo CNP   aspirin 81 MG chewable tablet Take 1 tablet by mouth daily Yes ZOHREH Garcia CNP       Social History     Tobacco Use    Smoking status: Former Smoker     Packs/day: 3.00     Types: Cigarettes    Smokeless tobacco: Never Used   Vaping Use    Vaping Use: Never used   Substance Use Topics    Alcohol use: No    Drug use: No        Allergies   Allergen Reactions    Oxycodone Hcl Anaphylaxis and Other (See Comments)    Betamethasone Dipropionate Other (See Comments)     Regular steroids cause dim vision    Cortisone      Blurred vision    Pcn [Penicillins] Hives    Morphine And Related Nausea And Vomiting   ,   Past Medical History:   Diagnosis Date    Anxiety     Bilateral carotid artery disease ([facility] [address] 3/30/2017    BPH (benign prostatic hypertrophy)     History of atrial fibrillation 3/30/2017    Hypertension     Low testosterone     Osteoarthritis  Shortness of breath 3/9/2021    Vertigo    ,   Past Surgical History:   Procedure Laterality Date    JOINT REPLACEMENT Left 10/11/2016    total    KIDNEY STONE SURGERY     ,   Family History   Problem Relation Age of Onset    Emphysema Father        Physical Exam   Constitutional: He is oriented to person, place, and time. He appears well-developed and well-nourished. HENT:   Head: Normocephalic and atraumatic. Neck: Normal range of motion. Neck supple. No JVD present. No tracheal deviation present. No thyromegaly present. Cardiovascular: Normal rate and regular rhythm. Murmur heard. Pulmonary/Chest: Effort normal and breath sounds normal. No respiratory distress. He has no wheezes. He has no rales. Abdominal: Soft. Bowel sounds are normal. He exhibits no distension. There is no tenderness. There is no rebound. Musculoskeletal: Normal range of motion. He exhibits no edema. Neurological: He is alert and oriented to person, place, and time. No cranial nerve deficit. Skin: Skin is warm and dry. No rash noted. No erythema. Psychiatric: He has a normal mood and affect. His behavior is normal.     ASSESSMENT:        Diagnosis Orders   1. AF (paroxysmal atrial fibrillation) ([facility])  EKG 12 Lead   2. History of atrial fibrillation     3. Essential hypertension     4. Bilateral carotid artery disease, unspecified type ([address]     5. Shortness of breath     5. FATIGUE. PLAN:       Consider resuming BBlocker in future if has tachy arrhythmias. Check EKG     STOP lOPRESSOR TO SEE IF ITS CAUSE OF HIS FATIGUE    CHECK LABS WITH PCP     Patient was advised and encouraged to check blood pressure at home or at a pharmacy, maintain a logbook, and also call us back if blood pressure are above the target ranges or if it is low.  Patient clearly understands and agrees to the instructions.      We will need to continue to monitor muscle and liver enzymes, BUN, CR, and electrolytes.     Details of medical condition explained and patient was warned about adverse consequences of uncontrolled medical conditions and possible side effects of prescribed medications. No follow-ups on file. An  electronic signature was used to authenticate this note.     --Rico Max DO on 11/30/2021 at 12:33 PM  9}

## 2021-12-15 DIAGNOSIS — R06.02 SHORTNESS OF BREATH: ICD-10-CM

## 2021-12-15 DIAGNOSIS — I10 ESSENTIAL HYPERTENSION: Primary | ICD-10-CM

## 2021-12-27 LAB
ALBUMIN: 3.8 G/DL (ref 3.4–5)
ALP BLD-CCNC: 89 U/L (ref 33–136)
ALT SERPL-CCNC: 19 U/L (ref 10–52)
ANION GAP SERPL CALCULATED.3IONS-SCNC: 11 MMOL/L (ref 10–20)
AST SERPL-CCNC: 19 U/L (ref 9–39)
BASOPHILS # BLD: 0.1 X10E9/L (ref 0–0.1)
BASOPHILS RELATIVE PERCENT: 1.4 % (ref 0–2)
BICARBONATE: 27 MMOL/L (ref 21–32)
BILIRUB SERPL-MCNC: 1 MG/DL (ref 0–1.2)
CALCIUM SERPL-MCNC: 9.2 MG/DL (ref 8.6–10.3)
CHLORIDE BLD-SCNC: 105 MMOL/L (ref 98–107)
CREAT SERPL-MCNC: 0.94 MG/DL (ref 0.5–1.3)
EOSINOPHIL # BLD: 0.44 X10E9/L (ref 0–0.4)
EOSINOPHILS RELATIVE PERCENT: 6 % (ref 0–6)
ERYTHROCYTE [DISTWIDTH] IN BLOOD BY AUTOMATED COUNT: 16 % (ref 11.5–14)
GFR AFRICAN AMERICAN: >60 ML/MIN/1.73M2
GFR NON-AFRICAN AMERICAN: >60 ML/MIN/1.73M2
GLUCOSE: 80 MG/DL (ref 74–99)
HCT VFR BLD CALC: 37.4 % (ref 41–52)
HEMOGLOBIN: 12 G/DL (ref 13.5–17)
IMMATURE GRANULOCYTES %: 0.5 % (ref 0–0.9)
LYMPHOCYTES # BLD: 31.4 % (ref 13–44)
LYMPHOCYTES RELATIVE PERCENT: 2.29 X10E9/L (ref 0.8–3)
MCHC RBC AUTO-ENTMCNC: 32.1 G/DL (ref 32–36)
MCV RBC AUTO: 91 FL (ref 80–100)
MONOCYTES # BLD: 1 X10E9/L (ref 0.05–0.8)
MONOCYTES RELATIVE PERCENT: 13.7 % (ref 2–10)
NEUTROPHILS RELATIVE PERCENT: 47 % (ref 40–80)
NEUTROPHILS: 3.43 X10E9/L (ref 1.6–5.5)
PLATELET # BLD: 186 X10E9/L (ref 150–450)
POTASSIUM SERPL-SCNC: 3.7 MMOL/L (ref 3.5–5.3)
RBC # BLD: 4.13 X10E12/L (ref 4.5–5.9)
SODIUM BLD-SCNC: 139 MMOL/L (ref 136–145)
TOTAL PROTEIN: 6.9 G/DL (ref 6.4–8.2)
UREA NITROGEN: 30 MG/DL (ref 6–23)
WBC: 7.3 X10E9/L (ref 4.4–11.3)

## 2022-01-10 ENCOUNTER — TELEPHONE (OUTPATIENT)
Dept: CARDIOLOGY CLINIC | Age: 87
End: 2022-01-10

## 2022-01-10 NOTE — TELEPHONE ENCOUNTER
All good. No sig.  Abn with labs  Please notify patient    Electronically signed by Molly Yin DO on 1/10/2022 at 3:49 PM

## 2022-01-10 NOTE — TELEPHONE ENCOUNTER
Patient calling would like results of labs done 12/27/21.  Please advise        Orders Only on 12/15/2021   Component Date Value Ref Range Status    WBC 12/27/2021 7.3  4.4 - 11.3 x10E9/L Final    RBC 12/27/2021 4.13* 4.50 - 5.9 x10E12/L Final    Hemoglobin 12/27/2021 12.0* 13.5 - 17 g/dL Final    Hematocrit 12/27/2021 37.4* 41.0 - 52 % Final    MCV 12/27/2021 91  80 - 100 fL Final    MCHC 12/27/2021 32.1  32.0 - 36 g/dL Final    Platelets 59/35/2929 186  150 - 450 x10E9/L Final    RDW-CV 12/27/2021 16.0* 11.5 - 14 % Final    Neutrophils % 12/27/2021 47.0  40.0 - 80 % Final    Immature Granulocytes % 12/27/2021 0.5  0.0 - 0.9 % Final    Lymphocytes 12/27/2021 31.4  13.0 - 44 % Final    Monocytes % 12/27/2021 13.7  2.0 - 10.0 % Final    Eosinophils % 12/27/2021 6.0  0.0 - 6.0 % Final    Basophils % 12/27/2021 1.4  0.0 - 2.0 % Final    Neutrophils 12/27/2021 3.43  1.60 - 5.5 x10E9/L Final    Lymphocytes % 12/27/2021 2.29  0.80 - 3.0 x10E9/L Final    Monocytes 12/27/2021 1.00* 0.05 - 0.8 x10E9/L Final    Eosinophils 12/27/2021 0.44* 0.00 - 0.4 x10E9/L Final    Basophils 12/27/2021 0.10  0.00 - 0.1 x10E9/L Final    Glucose 12/27/2021 80  74 - 99 mg/dL Final    Sodium 12/27/2021 139  136 - 145 mmol/L Final    Potassium 12/27/2021 3.7  3.5 - 5.3 mmol/L Final    Chloride 12/27/2021 105  98 - 107 mmol/L Final    HCO3 12/27/2021 27  21 - 32 mmol/L Final    Anion Gap 12/27/2021 11  10 - 20 mmol/L Final    Urea Nitrogen 12/27/2021 30* 6 - 23 mg/dL Final    CREATININE 12/27/2021 0.94  0.50 - 1.3 mg/dL Final    GFR Non- 12/27/2021 >60  >60 mL/min/1.73m2 Final    GFR  12/27/2021 >60  >60 mL/min/1.73m2 Final    Calcium 12/27/2021 9.2  8.6 - 10.3 mg/dL Final    Albumin 12/27/2021 3.8  3.4 - 5.0 g/dL Final    Alkaline Phosphatase 12/27/2021 89  33 - 136 U/L Final    Total Protein 12/27/2021 6.9  6.4 - 8.2 g/dL Final    AST 12/27/2021 19  9 - 39 U/L Final    Total Bilirubin 12/27/2021 1.0  0.0 - 1.2 mg/dL Final    ALT 12/27/2021 19  10 - 52 U/L Final

## 2022-07-07 ENCOUNTER — OFFICE VISIT (OUTPATIENT)
Dept: CARDIOLOGY CLINIC | Age: 87
End: 2022-07-07
Payer: MEDICARE

## 2022-07-07 VITALS
BODY MASS INDEX: 28.58 KG/M2 | SYSTOLIC BLOOD PRESSURE: 164 MMHG | DIASTOLIC BLOOD PRESSURE: 88 MMHG | WEIGHT: 199.2 LBS | RESPIRATION RATE: 14 BRPM | HEART RATE: 73 BPM | OXYGEN SATURATION: 99 %

## 2022-07-07 DIAGNOSIS — I63.9 CEREBROVASCULAR ACCIDENT (CVA), UNSPECIFIED MECHANISM (HCC): ICD-10-CM

## 2022-07-07 DIAGNOSIS — R06.02 SHORTNESS OF BREATH: ICD-10-CM

## 2022-07-07 DIAGNOSIS — I10 ESSENTIAL HYPERTENSION: Primary | ICD-10-CM

## 2022-07-07 DIAGNOSIS — I77.9 BILATERAL CAROTID ARTERY DISEASE, UNSPECIFIED TYPE (HCC): ICD-10-CM

## 2022-07-07 PROCEDURE — 99213 OFFICE O/P EST LOW 20 MIN: CPT | Performed by: INTERNAL MEDICINE

## 2022-07-07 PROCEDURE — 1123F ACP DISCUSS/DSCN MKR DOCD: CPT | Performed by: INTERNAL MEDICINE

## 2022-07-07 PROCEDURE — 93000 ELECTROCARDIOGRAM COMPLETE: CPT | Performed by: INTERNAL MEDICINE

## 2022-07-07 ASSESSMENT — ENCOUNTER SYMPTOMS
EYES NEGATIVE: 1
GASTROINTESTINAL NEGATIVE: 1
WHEEZING: 0
NAUSEA: 0
VOMITING: 0
SHORTNESS OF BREATH: 0
ALLERGIC/IMMUNOLOGIC NEGATIVE: 1
ABDOMINAL PAIN: 0

## 2022-07-07 NOTE — PROGRESS NOTES
7/7/2022      HPI:      HPI     2-1-17 with Kaila: Patient here for hospital follow-up from recent admission to [facility] for CVA and ? new onset atrial fibrillation. Reportedly when patient initially presented to [address] [place] [address] ER he was noted to be in atrial fibrillation; however, upon review of cardiology consult done at [facility] patient's initial rhythm appeared to be SR with PACs. EKG from [address] [place] [address] unavailable to me at this time. Had bilateral carotid US from 1/7/17 revealed 50-69% bilateral ICA stenosis. MRI brain 1/7/17 showed there is a focal area of diffusion restriction in the right periventricular deep white matter consistent with an acute ischemic nonhemorrhagic infarct. Was later discharge to [facility] rehab facility and discharged from there after 1.5 weeks of rehab.     Has been doing well since discharge with no complaints overall other than occasionally lower BP with lowest BP reading of 108/71. Patient has occasionally not been taking his Toprol XL 50mg PO daily and amlodipine 5mg PO daily if his SBP isn't above 140-160 range. Will experience dizziness and lightheadedness occasionally when SBP in low 100s. Denies chest pain/SOB/orthopnea/syncope/fever/chills. Has been compliant with xarelto. No bleeding issues.      3-2-17: s/p normal 48 hour holter monitor. States his BP is 160-170 in am and PM. Pt denies chest pain, dyspnea, dyspnea on exertion, change in exercise capacity, fatigue,  nausea, vomiting, diarrhea, constipation, motor weakness, insomnia, weight loss, syncope, dizziness, lightheadedness, palpitations, PND, orthopnea, or claudication. No nitro use. BP and hr are good. CAD is stable. No LE discoloration or ulcers. No LE edema. No CHF type symptoms.  Lipid profile is normal.      S/p LOUISA in 1/2017:  Conclusions   Summary   No mass, thrombus or vegetation.   Mild (1+) mitral regurgitation is present.   Mild aortic regurgitation is noted.   No evidence of aortic stenosis.   Tricuspid valve is structurally normal.   Mildly dilated LA   No evidence of left to right shunt with color flow doppler or right to   left shunt with agitated saline contrast study.   Normal left ventricle structure and function.   Normal right ventricle structure and function.   Normal right ventricle systolic pressure.   The aorta is within normal limits.   Plaque noted in the descending aorta   No evidence of pericardial effusion.     S/p ECHO in 1/2017: Summary   Diffusely thickened and pliable mitral valve leaflets with normal   excursion.   No evidence of mitral regurgitation.   No evidence of mitral valve stenosis   Sclerotic, trileaflet aortic valve with diffusely thickened leaflets with   normal cusp separation and excursion.   No evidence of aortic valve regurgitation   No evidence of aortic valve stenosis   Normal left atrium.   No left to right shunt with color flow doppler or right to left shunt with   agitated saline contrast study.   Normal left ventricular systolic function, no regional wall motion   abnormalities, estimated ejection fraction of 60%   Normal left ventricular size and function.   Normal left ventricular wall thickness.   Impaired relaxation compatible with diastolic dysfunction. ( reversed E/A   ratio)   Normal right ventricle structure and function.   Normal right ventricle systolic pressure.   Miscellaneous normal findings were found.   No evidence of pericardial effusion.        3-30-17: s/p event monitor with no evidence of afibb for 2 weeks.         2-27-20: HAS NOT BEEN seen in 3 yrs. EKG today with NSR. Pt denies chest pain, dyspnea, dyspnea on exertion, change in exercise capacity, fatigue,  nausea, vomiting, diarrhea, constipation, motor weakness, insomnia, weight loss, syncope, dizziness, lightheadedness, palpitations, PND, orthopnea, or claudication. No nitro use. BP and hr are good. CAD is stable. No LE discoloration or ulcers. No LE edema.  No CHF type symptoms. Lipid profile is normal. No recent hospitalization. No change in meds. Has hx of CUS with 50-69% stenosis in 2017.      20:   Edvin Beard (:  1935) has requested an audio/video evaluation for the following concern(s):    That is post carotid ultrasound with less than 50% bilateral stenosis. Status post echo ejection fraction 60%, mild LVH, grade 1 diastolic dysfunction trace MR. Following with urology for history of prostate cancer. Pt denies chest pain, dyspnea, dyspnea on exertion, change in exercise capacity, fatigue,  nausea, vomiting, diarrhea, constipation, motor weakness, insomnia, weight loss, syncope, dizziness, lightheadedness, palpitations, PND, orthopnea, or claudication. 3/9/2021: Patient was on Lupron shots for prostate cancer. Has been short of breath since his Lupron shots. States he has to take a break with walking to his mailbox. Pt denies chest pain,nausea, vomiting, diarrhea, constipation, motor weakness, insomnia, weight loss, syncope, dizziness, lightheadedness, palpitations, PND, orthopnea, or claudication. EKG with normal sinus rhythm positive PAC/PVC. States he had recent blood work which was okay. Status post carotid ultrasound in  with 50% bilateral stenosis. Last echo with normal LV function trace MR, mild MR. Did not tolerate statin. He is off of Lipitor now. 21: hx of prostate cancer. On lupron. Doing ok overall hx of irregular heart beat, PVC'PAC's. Pt denies chest pain,dyspnea on exertion, change in exercise capacity, fatigue,  nausea, vomiting, diarrhea, constipation, motor weakness, insomnia, weight loss, syncope, dizziness, lightheadedness, palpitations, PND, orthopnea, or claudication. Does have SOB  Status post carotid ultrasound in  with 50% bilateral stenosis. Last echo with normal LV function trace MR, mild MR. S/p normal nuclear stress test in 2021.    EKG with NSR, PVC    22:  Feels better off bblocker, less fatigue. Status post carotid ultrasound in 2020 with 50% bilateral stenosis. Last echo with normal LV function trace MR, mild MR. S/p normal nuclear stress test in 5/2021. Pt denies chest pain, dyspnea, dyspnea on exertion, change in exercise capacity, fatigue,  nausea, vomiting, diarrhea, constipation, motor weakness, insomnia, weight loss, syncope, dizziness, lightheadedness, palpitations, PND, orthopnea, or claudication. Hx of PVC/PAC's. EKG with SB. Review of Systems   Constitutional: Negative. Negative for chills and fever. HENT: Negative. Eyes: Negative. Respiratory: Negative for shortness of breath and wheezing. Cardiovascular: Negative for chest pain, palpitations and leg swelling. Gastrointestinal: Negative. Negative for abdominal pain, nausea and vomiting. Endocrine: Negative. Genitourinary: Negative. Musculoskeletal: Negative. Skin: Negative. Negative for rash. Allergic/Immunologic: Negative. Neurological: Negative for dizziness, weakness and headaches. Hematological: Negative. Psychiatric/Behavioral: Negative. Prior to Visit Medications    Medication Sig Taking?  Authorizing Provider   finasteride (PROSCAR) 5 MG tablet Take 5 mg by mouth every other day  Yes Historical Provider, MD   diclofenac (VOLTAREN) 75 MG EC tablet Take 75 mg by mouth 2 times daily Yes Historical Provider, MD   amLODIPine (NORVASC) 2.5 MG tablet TAKE 1 TABLET BY MOUTH  DAILY Yes ZOHREH Jacobs CNP   aspirin 81 MG chewable tablet Take 1 tablet by mouth daily Yes ZOHREH Lucio CNP       Social History     Tobacco Use    Smoking status: Former Smoker     Packs/day: 3.00     Types: Cigarettes    Smokeless tobacco: Never Used   Vaping Use    Vaping Use: Never used   Substance Use Topics    Alcohol use: No    Drug use: No        Allergies   Allergen Reactions    Oxycodone Hcl Anaphylaxis and Other (See Comments)    Betamethasone Dipropionate Other (See Comments)     Regular steroids cause dim vision    Cortisone      Blurred vision    Pcn [Penicillins] Hives    Morphine And Related Nausea And Vomiting   ,   Past Medical History:   Diagnosis Date    Anxiety     Bilateral carotid artery disease ([facility]) 3/30/2017    BPH (benign prostatic hypertrophy)     History of atrial fibrillation 3/30/2017    Hypertension     Low testosterone     Osteoarthritis     Shortness of breath 3/9/2021    Vertigo    ,   Past Surgical History:   Procedure Laterality Date    JOINT REPLACEMENT Left 10/11/2016    total    KIDNEY STONE SURGERY     ,   Family History   Problem Relation Age of Onset    Emphysema Father        Physical Exam   Constitutional: He is oriented to person, place, and time. He appears well-developed and well-nourished. HENT:   Head: Normocephalic and atraumatic. Neck: Normal range of motion. Neck supple. No JVD present. No tracheal deviation present. No thyromegaly present. Cardiovascular: Normal rate and regular rhythm. Murmur heard. Pulmonary/Chest: Effort normal and breath sounds normal. No respiratory distress. He has no wheezes. He has no rales. Abdominal: Soft. Bowel sounds are normal. He exhibits no distension. There is no tenderness. There is no rebound. Musculoskeletal: Normal range of motion. He exhibits no edema. Neurological: He is alert and oriented to person, place, and time. No cranial nerve deficit. Skin: Skin is warm and dry. No rash noted. No erythema. Psychiatric: He has a normal mood and affect. His behavior is normal.     ASSESSMENT:        Diagnosis Orders   1. Essential hypertension  EKG 12 lead   2. Cerebrovascular accident (CVA), unspecified mechanism ([address]     3. Shortness of breath     4. Bilateral carotid artery disease, unspecified type ([address]     5. FATIGUE. Better off of bblocker.      PLAN:        Check EKG     No lOPRESSOR-  FATIGUE    CHECK LABS WITH PCP     Patient was advised and encouraged to check blood pressure at home or at a pharmacy, maintain a logbook, and also call us back if blood pressure are above the target ranges or if it is low. Patient clearly understands and agrees to the instructions.      We will need to continue to monitor muscle and liver enzymes, BUN, CR, and electrolytes.     Details of medical condition explained and patient was warned about adverse consequences of uncontrolled medical conditions and possible side effects of prescribed medications. Return in about 8 months (around 3/7/2023). An  electronic signature was used to authenticate this note.     --Gregorio Guerrero DO on 7/7/2022 at 2:48 PM  9}

## 2023-03-30 DIAGNOSIS — R42 VERTIGO: Primary | ICD-10-CM

## 2023-03-31 RX ORDER — MECLIZINE HYDROCHLORIDE 25 MG/1
TABLET ORAL
Qty: 270 TABLET | Refills: 1 | Status: SHIPPED | OUTPATIENT
Start: 2023-03-31 | End: 2023-06-26

## 2023-04-06 ENCOUNTER — TELEPHONE (OUTPATIENT)
Dept: PRIMARY CARE | Facility: CLINIC | Age: 88
End: 2023-04-06
Payer: MEDICARE

## 2023-04-06 DIAGNOSIS — J06.9 UPPER RESPIRATORY TRACT INFECTION, UNSPECIFIED TYPE: Primary | ICD-10-CM

## 2023-04-06 RX ORDER — AZITHROMYCIN 250 MG/1
TABLET, FILM COATED ORAL
Qty: 6 TABLET | Refills: 0 | Status: SHIPPED | OUTPATIENT
Start: 2023-04-06 | End: 2023-04-07 | Stop reason: SDUPTHER

## 2023-04-06 NOTE — TELEPHONE ENCOUNTER
PATIENT CALLED DUE TO A VERY SORE THROAT THAT WILL NOT GO AWAY.  CAN WE FIND A SPOT ON YOUR SCHEDULE OR PLEASE ADVISE.

## 2023-04-07 RX ORDER — AZITHROMYCIN 250 MG/1
TABLET, FILM COATED ORAL
Qty: 6 TABLET | Refills: 0 | Status: SHIPPED | OUTPATIENT
Start: 2023-04-07 | End: 2023-04-12

## 2023-04-07 NOTE — TELEPHONE ENCOUNTER
Pt is calling in regards to the antibiotic that you sent over yesterday. It was sent to [facility] but he needed it sent to [facility] in [place]  Please send to [facility] [place]  Thanks

## 2023-04-12 ENCOUNTER — TELEPHONE (OUTPATIENT)
Dept: PRIMARY CARE | Facility: CLINIC | Age: 88
End: 2023-04-12
Payer: MEDICARE

## 2023-04-12 DIAGNOSIS — J06.9 UPPER RESPIRATORY TRACT INFECTION, UNSPECIFIED TYPE: ICD-10-CM

## 2023-04-12 RX ORDER — CEFDINIR 300 MG/1
300 CAPSULE ORAL 2 TIMES DAILY
Qty: 14 CAPSULE | Refills: 0 | Status: SHIPPED | OUTPATIENT
Start: 2023-04-12 | End: 2023-04-14 | Stop reason: SDUPTHER

## 2023-04-12 NOTE — TELEPHONE ENCOUNTER
Pt calling, he states that he finished the antibiotic yesterday, and still feels terrible. He has been sneezing, coughing, and has a runny nose. He states that he feel worse than before. Is there anything else that he can take or do?    Please advise

## 2023-04-13 ENCOUNTER — TELEPHONE (OUTPATIENT)
Dept: PRIMARY CARE | Facility: CLINIC | Age: 88
End: 2023-04-13
Payer: MEDICARE

## 2023-04-13 PROBLEM — R06.02 SHORTNESS OF BREATH: Status: RESOLVED | Noted: 2021-03-09 | Resolved: 2023-04-13

## 2023-04-13 NOTE — TELEPHONE ENCOUNTER
Pt calling, Can we sent this medication to the local [facility]. Patient does not want this to go to [facility].    Pharmacy: [facility]

## 2023-04-14 RX ORDER — CEFDINIR 300 MG/1
300 CAPSULE ORAL 2 TIMES DAILY
Qty: 14 CAPSULE | Refills: 0 | Status: SHIPPED | OUTPATIENT
Start: 2023-04-14 | End: 2023-04-21

## 2023-05-13 DIAGNOSIS — M19.90 ARTHRITIS: ICD-10-CM

## 2023-05-15 RX ORDER — DICLOFENAC SODIUM 75 MG/1
TABLET, DELAYED RELEASE ORAL
Qty: 180 TABLET | Refills: 1 | Status: SHIPPED | OUTPATIENT
Start: 2023-05-15 | End: 2023-08-21

## 2023-05-26 DIAGNOSIS — I10 HTN (HYPERTENSION), BENIGN: Primary | ICD-10-CM

## 2023-05-26 RX ORDER — AMLODIPINE BESYLATE 5 MG/1
TABLET ORAL
Qty: 180 TABLET | Refills: 0 | Status: SHIPPED | OUTPATIENT
Start: 2023-05-26 | End: 2023-08-07

## 2023-06-09 LAB
PROSTATE SPECIFIC AG (NG/ML) IN SER/PLAS: 2.78 NG/ML (ref 0–4)
TESTOSTERONE (NG/DL) IN SER/PLAS: 738 NG/DL (ref 240–1000)

## 2023-06-26 DIAGNOSIS — R42 VERTIGO: ICD-10-CM

## 2023-06-26 RX ORDER — MECLIZINE HYDROCHLORIDE 25 MG/1
TABLET ORAL
Qty: 90 TABLET | Refills: 0 | Status: SHIPPED | OUTPATIENT
Start: 2023-06-26 | End: 2023-07-19 | Stop reason: SDUPTHER

## 2023-06-27 ENCOUNTER — OFFICE VISIT (OUTPATIENT)
Dept: CARDIOLOGY CLINIC | Age: 88
End: 2023-06-27
Payer: MEDICARE

## 2023-06-27 VITALS
HEIGHT: 70 IN | HEART RATE: 65 BPM | DIASTOLIC BLOOD PRESSURE: 60 MMHG | WEIGHT: 191 LBS | SYSTOLIC BLOOD PRESSURE: 120 MMHG | OXYGEN SATURATION: 98 % | BODY MASS INDEX: 27.35 KG/M2

## 2023-06-27 DIAGNOSIS — I77.9 BILATERAL CAROTID ARTERY DISEASE, UNSPECIFIED TYPE (HCC): Primary | ICD-10-CM

## 2023-06-27 DIAGNOSIS — I10 ESSENTIAL HYPERTENSION: ICD-10-CM

## 2023-06-27 PROCEDURE — 99213 OFFICE O/P EST LOW 20 MIN: CPT | Performed by: INTERNAL MEDICINE

## 2023-06-27 PROCEDURE — 1123F ACP DISCUSS/DSCN MKR DOCD: CPT | Performed by: INTERNAL MEDICINE

## 2023-06-27 ASSESSMENT — ENCOUNTER SYMPTOMS
VOMITING: 0
WHEEZING: 0
SHORTNESS OF BREATH: 0
NAUSEA: 0
EYES NEGATIVE: 1
ALLERGIC/IMMUNOLOGIC NEGATIVE: 1
ABDOMINAL PAIN: 0
GASTROINTESTINAL NEGATIVE: 1

## 2023-07-18 ENCOUNTER — OFFICE VISIT (OUTPATIENT)
Dept: PRIMARY CARE | Facility: CLINIC | Age: 88
End: 2023-07-18
Payer: MEDICARE

## 2023-07-18 VITALS
TEMPERATURE: 98.8 F | DIASTOLIC BLOOD PRESSURE: 88 MMHG | SYSTOLIC BLOOD PRESSURE: 148 MMHG | WEIGHT: 189.4 LBS | BODY MASS INDEX: 27.11 KG/M2 | OXYGEN SATURATION: 95 % | HEIGHT: 70 IN | RESPIRATION RATE: 18 BRPM | HEART RATE: 51 BPM

## 2023-07-18 DIAGNOSIS — C61 CA PROSTATE, ADENOCA (MULTI): ICD-10-CM

## 2023-07-18 DIAGNOSIS — G89.29 CHRONIC PAIN OF RIGHT KNEE: ICD-10-CM

## 2023-07-18 DIAGNOSIS — M25.561 CHRONIC PAIN OF RIGHT KNEE: ICD-10-CM

## 2023-07-18 DIAGNOSIS — I10 ESSENTIAL HYPERTENSION: Primary | ICD-10-CM

## 2023-07-18 DIAGNOSIS — M15.9 PRIMARY OSTEOARTHRITIS INVOLVING MULTIPLE JOINTS: ICD-10-CM

## 2023-07-18 DIAGNOSIS — E78.49 OTHER HYPERLIPIDEMIA: ICD-10-CM

## 2023-07-18 DIAGNOSIS — R09.1 PLEURISY: ICD-10-CM
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
## 2023-07-18 DIAGNOSIS — H53.9 VISION CHANGES: ICD-10-CM

## 2023-07-18 DIAGNOSIS — I48.20 CHRONIC ATRIAL FIBRILLATION (MULTI): ICD-10-CM

## 2023-07-18 PROBLEM — M15.0 PRIMARY OSTEOARTHRITIS INVOLVING MULTIPLE JOINTS: Status: ACTIVE | Noted: 2023-07-18

## 2023-07-18 PROBLEM — R20.0 BILATERAL LEG NUMBNESS: Status: ACTIVE | Noted: 2023-07-18

## 2023-07-18 PROBLEM — N39.498 FREQUENT URINARY INCONTINENCE: Status: ACTIVE | Noted: 2023-07-18

## 2023-07-18 PROBLEM — R53.83 FATIGUE: Status: ACTIVE | Noted: 2023-07-18

## 2023-07-18 PROBLEM — R06.02 MILD SHORTNESS OF BREATH: Status: ACTIVE | Noted: 2023-07-18

## 2023-07-18 PROBLEM — R97.20 ELEVATED PSA: Status: ACTIVE | Noted: 2023-07-18

## 2023-07-18 PROBLEM — E29.1 HYPOGONADISM, MALE: Status: ACTIVE | Noted: 2023-07-18

## 2023-07-18 PROBLEM — R42 VERTIGO: Status: ACTIVE | Noted: 2023-07-18

## 2023-07-18 PROCEDURE — 3079F DIAST BP 80-89 MM HG: CPT | Performed by: FAMILY MEDICINE

## 2023-07-18 PROCEDURE — 1159F MED LIST DOCD IN RCRD: CPT | Performed by: FAMILY MEDICINE

## 2023-07-18 PROCEDURE — 1160F RVW MEDS BY RX/DR IN RCRD: CPT | Performed by: FAMILY MEDICINE

## 2023-07-18 PROCEDURE — 3077F SYST BP >= 140 MM HG: CPT | Performed by: FAMILY MEDICINE

## 2023-07-18 PROCEDURE — 1036F TOBACCO NON-USER: CPT | Performed by: FAMILY MEDICINE

## 2023-07-18 PROCEDURE — 99213 OFFICE O/P EST LOW 20 MIN: CPT | Performed by: FAMILY MEDICINE

## 2023-07-18 RX ORDER — ASPIRIN 81 MG/1
1 TABLET ORAL DAILY
COMMUNITY
Start: 2019-07-17

## 2023-07-18 RX ORDER — MULTIVIT-MINERALS/HERBAL 121 20-8.5-5
2 CAPSULE ORAL DAILY
COMMUNITY
Start: 2019-07-17 | End: 2023-07-18 | Stop reason: ALTCHOICE

## 2023-07-18 RX ORDER — PREDNISONE 5 MG/1
TABLET ORAL
COMMUNITY
Start: 2023-01-27 | End: 2023-07-18 | Stop reason: ALTCHOICE

## 2023-07-18 ASSESSMENT — PATIENT HEALTH QUESTIONNAIRE - PHQ9
2. FEELING DOWN, DEPRESSED OR HOPELESS: NOT AT ALL
1. LITTLE INTEREST OR PLEASURE IN DOING THINGS: NOT AT ALL
SUM OF ALL RESPONSES TO PHQ9 QUESTIONS 1 AND 2: 0

## 2023-07-18 ASSESSMENT — ENCOUNTER SYMPTOMS: DEPRESSION: 0

## 2023-07-18 NOTE — PROGRESS NOTES
Subjective   Patient ID: Benjamin King is a 88 y.o. male who presents for Hypertension, Fall, Knee Pain, Eye Problem, and Pain With Breathing.    HPI    Patient presents today for HTN follow up.  He does try to stick to a low sodium, low cholesterol diet.  Does not exercise.  Denies chest pain.    He states every morning when he wakes up he gets a aching in his lungs. He states sometimes he cannot talk because mucous makes his voice gargled.  He does not believe his pleurisy has resolved.     He also states that he has been having vision issues. Sometimes his vision is clear and sometimes it isn't. He has not seen any doctor recently.  He saw a retina specialist 4-5 years ago.     He is having pain and difficulty with right knee. States this is bone on bone.  He has had injections before.  He is seeing Dr. Jauregui with orthopedics for that knee.     He states he fell 4 weeks ago and hit his head on concrete.  He did have 2 cuts on right temple.  He would like to know if he has any continued symptoms from this.  Denies any headaches. He states sometimes his hands don't do what he is telling them to do.    He recently had Testosterone and PSA performed on 6/9/23.    Taking current medications which were reviewed.  Problem list discussed.    Overall doing well.  Eating okay.  Staying active.    Has no other new problem /question.      ROS  Constitutional- No activity change. No appetite change.  Eyes- Denies vision changes.  Respiratory- No shortness of breath.  Cardiovascular- No palpitations. No chest pain.  GI- No nausea or vomiting. No diarrhea or constipation. Denies abdominal pain.  Musculoskeletal- Denies joint swelling.  Extremities- No edema.  Neurological- Denies headaches. Denies dizziness.  Skin- No rashes.  Psychiatric/Behavioral- Denies significant anxiety, or depressed mood.     Objective     /88 (BP Location: Left arm, Patient Position: Sitting, BP Cuff Size: Adult)   Pulse 51   Temp 37.1 °C  "(98.8 °F) (Temporal)   Resp 18   Ht 1.778 m (5' 10\")   Wt 85.9 kg (189 lb 6.4 oz)   SpO2 95%   BMI 27.18 kg/m²     Allergies   Allergen Reactions    Oxycodone Anaphylaxis and Unknown    Morphine Unknown    Penicillins Hives    Prednisone Other       Constitutional-- Well-nourished.  No distress  Eyes- PERRL.  Conjunctiva normal.  Nose- Normal.  No rhinorrhea noted.  Throat- Oropharynx is clear and moist.  Neck- Supple with no thyromegaly.  No significant cervical adenopathy noted.  Pulmonary/Chest- Breath sounds normal with normal effort.  No wheezing.  Heart- Regular rate and rhythm.  No murmur.  Abdomen- Soft and non-tender.  No masses noted.  Musculoskeletal- Normal ROM.  No significant joint swelling  Extremities- No edema.   Neurological- Alert.  No noted deficits.  Skin- Warm.  No rashes.  Psychiatric/Behavioral- Mood and affect normal.  Behavior normal.     Assessment/Plan   1. Essential hypertension controlled on meds CBC and Auto Differential    Comprehensive Metabolic Panel      2. Other hyperlipidemia stable.  Lab ordered Lipid Panel      3. Vision changes has issues with right eye and left eye is his good eye.  Will see ophthalmologist       4. Primary osteoarthritis involving multiple joints stable       5. Pleurisy        6. Chronic pain of right knee stable       7. Fall, initial encounter        8. CA prostate, adenoca (CMS/HCC) stable       9. Chronic atrial fibrillation (CMS/HCC)             Long talk. Treatment options reviewed.    Continue and take your medications as prescribed.    Health Maintenance issues discussed.    Importance of healthy diet and regular exercise regimen discussed.    We will contact you with any test results ordered. If you do not hear from us, please contact.    Follow-up as instructed or sooner if any problems or symptoms do not resolve as expected.     "

## 2023-07-19 DIAGNOSIS — R42 VERTIGO: ICD-10-CM

## 2023-07-19 RX ORDER — MECLIZINE HYDROCHLORIDE 25 MG/1
TABLET ORAL
Qty: 90 TABLET | Refills: 0 | Status: SHIPPED | OUTPATIENT
Start: 2023-07-19 | End: 2023-07-28 | Stop reason: SDUPTHER

## 2023-07-19 NOTE — TELEPHONE ENCOUNTER
Rx Refill Request Telephone Encounter    Name:  Benjamin King  : 1935     Medication Name:  Meclizine 25 MG  Quantity (Optional):   270 Refill:1  Directions (Optional):   TAKE 1 TABLET BY MOUTH 3 TIMES  DAILY AS NEEDED     Specific Pharmacy location:  [facility]    Date of last appointment:  23

## 2023-07-20 ENCOUNTER — LAB (OUTPATIENT)
Dept: LAB | Facility: LAB | Age: 88
End: 2023-07-20
Payer: MEDICARE

## 2023-07-20 DIAGNOSIS — I10 ESSENTIAL HYPERTENSION: ICD-10-CM

## 2023-07-20 DIAGNOSIS — E78.49 OTHER HYPERLIPIDEMIA: ICD-10-CM

## 2023-07-20 LAB
ALANINE AMINOTRANSFERASE (SGPT) (U/L) IN SER/PLAS: 18 U/L (ref 10–52)
ALBUMIN (G/DL) IN SER/PLAS: 4 G/DL (ref 3.4–5)
ALKALINE PHOSPHATASE (U/L) IN SER/PLAS: 106 U/L (ref 33–136)
ANION GAP IN SER/PLAS: 14 MMOL/L (ref 10–20)
ASPARTATE AMINOTRANSFERASE (SGOT) (U/L) IN SER/PLAS: 19 U/L (ref 9–39)
BASOPHILS (10*3/UL) IN BLOOD BY AUTOMATED COUNT: 0.11 X10E9/L (ref 0–0.1)
BASOPHILS/100 LEUKOCYTES IN BLOOD BY AUTOMATED COUNT: 1.4 % (ref 0–2)
BILIRUBIN TOTAL (MG/DL) IN SER/PLAS: 1.1 MG/DL (ref 0–1.2)
CALCIUM (MG/DL) IN SER/PLAS: 9.5 MG/DL (ref 8.6–10.3)
CARBON DIOXIDE, TOTAL (MMOL/L) IN SER/PLAS: 24 MMOL/L (ref 21–32)
CHLORIDE (MMOL/L) IN SER/PLAS: 106 MMOL/L (ref 98–107)
CHOLESTEROL (MG/DL) IN SER/PLAS: 175 MG/DL (ref 0–199)
CHOLESTEROL IN HDL (MG/DL) IN SER/PLAS: 46.9 MG/DL
CHOLESTEROL/HDL RATIO: 3.7
CREATININE (MG/DL) IN SER/PLAS: 0.86 MG/DL (ref 0.5–1.3)
EOSINOPHILS (10*3/UL) IN BLOOD BY AUTOMATED COUNT: 0.64 X10E9/L (ref 0–0.4)
EOSINOPHILS/100 LEUKOCYTES IN BLOOD BY AUTOMATED COUNT: 7.9 % (ref 0–6)
ERYTHROCYTE DISTRIBUTION WIDTH (RATIO) BY AUTOMATED COUNT: 15.6 % (ref 11.5–14.5)
ERYTHROCYTE MEAN CORPUSCULAR HEMOGLOBIN CONCENTRATION (G/DL) BY AUTOMATED: 32.5 G/DL (ref 32–36)
ERYTHROCYTE MEAN CORPUSCULAR VOLUME (FL) BY AUTOMATED COUNT: 87 FL (ref 80–100)
ERYTHROCYTES (10*6/UL) IN BLOOD BY AUTOMATED COUNT: 4.21 X10E12/L (ref 4.5–5.9)
GFR MALE: 84 ML/MIN/1.73M2
GLUCOSE (MG/DL) IN SER/PLAS: 89 MG/DL (ref 74–99)
HEMATOCRIT (%) IN BLOOD BY AUTOMATED COUNT: 36.6 % (ref 41–52)
HEMOGLOBIN (G/DL) IN BLOOD: 11.9 G/DL (ref 13.5–17.5)
IMMATURE GRANULOCYTES/100 LEUKOCYTES IN BLOOD BY AUTOMATED COUNT: 0.2 % (ref 0–0.9)
LDL: 118 MG/DL (ref 0–99)
LEUKOCYTES (10*3/UL) IN BLOOD BY AUTOMATED COUNT: 8.1 X10E9/L (ref 4.4–11.3)
LYMPHOCYTES (10*3/UL) IN BLOOD BY AUTOMATED COUNT: 2.4 X10E9/L (ref 0.8–3)
LYMPHOCYTES/100 LEUKOCYTES IN BLOOD BY AUTOMATED COUNT: 29.6 % (ref 13–44)
MONOCYTES (10*3/UL) IN BLOOD BY AUTOMATED COUNT: 0.89 X10E9/L (ref 0.05–0.8)
MONOCYTES/100 LEUKOCYTES IN BLOOD BY AUTOMATED COUNT: 11 % (ref 2–10)
NEUTROPHILS (10*3/UL) IN BLOOD BY AUTOMATED COUNT: 4.04 X10E9/L (ref 1.6–5.5)
NEUTROPHILS/100 LEUKOCYTES IN BLOOD BY AUTOMATED COUNT: 49.9 % (ref 40–80)
PLATELETS (10*3/UL) IN BLOOD AUTOMATED COUNT: 266 X10E9/L (ref 150–450)
POTASSIUM (MMOL/L) IN SER/PLAS: 4 MMOL/L (ref 3.5–5.3)
PROTEIN TOTAL: 7.7 G/DL (ref 6.4–8.2)
SODIUM (MMOL/L) IN SER/PLAS: 140 MMOL/L (ref 136–145)
TRIGLYCERIDE (MG/DL) IN SER/PLAS: 51 MG/DL (ref 0–149)
UREA NITROGEN (MG/DL) IN SER/PLAS: 29 MG/DL (ref 6–23)
VLDL: 10 MG/DL (ref 0–40)

## 2023-07-20 PROCEDURE — 80053 COMPREHEN METABOLIC PANEL: CPT

## 2023-07-20 PROCEDURE — 80061 LIPID PANEL: CPT

## 2023-07-20 PROCEDURE — 36415 COLL VENOUS BLD VENIPUNCTURE: CPT

## 2023-07-20 PROCEDURE — 85025 COMPLETE CBC W/AUTO DIFF WBC: CPT

## 2023-07-28 RX ORDER — MECLIZINE HYDROCHLORIDE 25 MG/1
TABLET ORAL
Qty: 270 TABLET | Refills: 0 | Status: SHIPPED | OUTPATIENT
Start: 2023-07-28

## 2023-07-28 NOTE — TELEPHONE ENCOUNTER
Pt is calling in regards to the rx for his Meclizine 25 mg. It was supposed to be for a 90 day supply and not a 30 day supply (which was sent in) because he doesn't have a copay with the 90 day supply  Please send in the 90 day supply of his Meclizine to [facility]  Thanks    DOL visit with you 7/18/23

## 2023-08-06 DIAGNOSIS — I10 HTN (HYPERTENSION), BENIGN: ICD-10-CM

## 2023-08-07 RX ORDER — AMLODIPINE BESYLATE 5 MG/1
TABLET ORAL
Qty: 180 TABLET | Refills: 1 | Status: SHIPPED | OUTPATIENT
Start: 2023-08-07 | End: 2023-11-03

## 2023-08-20 DIAGNOSIS — M19.90 ARTHRITIS: ICD-10-CM

## 2023-08-21 RX ORDER — DICLOFENAC SODIUM 75 MG/1
TABLET, DELAYED RELEASE ORAL
Qty: 180 TABLET | Refills: 1 | Status: SHIPPED | OUTPATIENT
Start: 2023-08-21 | End: 2023-11-17

## 2023-09-22 LAB
PROSTATE SPECIFIC AG (NG/ML) IN SER/PLAS: 20.01 NG/ML (ref 0–4)
TESTOSTERONE (NG/DL) IN SER/PLAS: 828 NG/DL (ref 240–1000)

## 2023-11-03 DIAGNOSIS — I10 HTN (HYPERTENSION), BENIGN: ICD-10-CM

## 2023-11-03 RX ORDER — AMLODIPINE BESYLATE 5 MG/1
TABLET ORAL
Qty: 180 TABLET | Refills: 0 | Status: SHIPPED | OUTPATIENT
Start: 2023-11-03 | End: 2024-01-02

## 2023-11-14 ENCOUNTER — HOSPITAL ENCOUNTER (OUTPATIENT)
Dept: NUCLEAR MEDICINE | Age: 88
Discharge: HOME | End: 2023-11-16
Payer: MEDICARE

## 2023-11-14 DIAGNOSIS — C61 MALIGNANT NEOPLASM OF PROSTATE (HCC): ICD-10-CM

## 2023-11-14 PROCEDURE — 78306 BONE IMAGING WHOLE BODY: CPT | Performed by: UROLOGY

## 2023-11-14 PROCEDURE — 3430000000 HC RX DIAGNOSTIC RADIOPHARMACEUTICAL: Performed by: UROLOGY

## 2023-11-14 PROCEDURE — A9503 TC99M MEDRONATE: HCPCS | Performed by: UROLOGY

## 2023-11-14 RX ORDER — TC 99M MEDRONATE 20 MG/10ML
25 INJECTION, POWDER, LYOPHILIZED, FOR SOLUTION INTRAVENOUS
Status: COMPLETED | OUTPATIENT
Start: 2023-11-14 | End: 2023-11-14

## 2023-11-14 RX ADMIN — TC 99M MEDRONATE 29.5 MILLICURIE: 20 INJECTION, POWDER, LYOPHILIZED, FOR SOLUTION INTRAVENOUS at 10:08

## 2023-11-16 DIAGNOSIS — M19.90 ARTHRITIS: ICD-10-CM

## 2023-11-17 RX ORDER — DICLOFENAC SODIUM 75 MG/1
TABLET, DELAYED RELEASE ORAL
Qty: 180 TABLET | Refills: 0 | Status: SHIPPED | OUTPATIENT
Start: 2023-11-17 | End: 2024-01-16

## 2023-12-12 ENCOUNTER — OFFICE VISIT (OUTPATIENT)
Dept: PRIMARY CARE | Facility: CLINIC | Age: 88
End: 2023-12-12
Payer: MEDICARE

## 2023-12-12 VITALS
TEMPERATURE: 97.8 F | OXYGEN SATURATION: 98 % | HEART RATE: 70 BPM | HEIGHT: 70 IN | SYSTOLIC BLOOD PRESSURE: 120 MMHG | RESPIRATION RATE: 18 BRPM | WEIGHT: 178 LBS | DIASTOLIC BLOOD PRESSURE: 80 MMHG | BODY MASS INDEX: 25.48 KG/M2

## 2023-12-12 DIAGNOSIS — I10 ESSENTIAL HYPERTENSION: ICD-10-CM

## 2023-12-12 DIAGNOSIS — G89.29 CHRONIC PAIN OF RIGHT KNEE: ICD-10-CM

## 2023-12-12 DIAGNOSIS — E78.49 OTHER HYPERLIPIDEMIA: ICD-10-CM

## 2023-12-12 DIAGNOSIS — M25.561 CHRONIC PAIN OF RIGHT KNEE: ICD-10-CM

## 2023-12-12 DIAGNOSIS — M15.9 PRIMARY OSTEOARTHRITIS INVOLVING MULTIPLE JOINTS: ICD-10-CM

## 2023-12-12 DIAGNOSIS — I48.20 CHRONIC ATRIAL FIBRILLATION (MULTI): ICD-10-CM

## 2023-12-12 PROCEDURE — 99213 OFFICE O/P EST LOW 20 MIN: CPT | Performed by: FAMILY MEDICINE

## 2023-12-12 PROCEDURE — 1159F MED LIST DOCD IN RCRD: CPT | Performed by: FAMILY MEDICINE

## 2023-12-12 PROCEDURE — 3079F DIAST BP 80-89 MM HG: CPT | Performed by: FAMILY MEDICINE

## 2023-12-12 PROCEDURE — 1160F RVW MEDS BY RX/DR IN RCRD: CPT | Performed by: FAMILY MEDICINE

## 2023-12-12 PROCEDURE — 1036F TOBACCO NON-USER: CPT | Performed by: FAMILY MEDICINE

## 2023-12-12 PROCEDURE — 3074F SYST BP LT 130 MM HG: CPT | Performed by: FAMILY MEDICINE

## 2023-12-12 NOTE — PROGRESS NOTES
"Subjective   Patient ID: Benjamin King is a 88 y.o. male who presents for brittle nails, Hypertension, and Weight Loss.  HPI    HTN follow up  Denies chest pain,SOB, swelling, headaches, lightheadedness or dizziness.   Eats a generally healthy diet, Exercises.   Does check BP at home.   Currently taking amlodipine     Also presents in office today for his finger nails. Admits that his nails are braking. Admits that he did start taking a one a day mens vitamin. Admits that yesterday was better. Admits that he would like to know what could cause this to happen. Wondering if he is missing something     Patient admits that he was taking Bicalutamide 50 MG for 30 days. Admits that this did help him lose 25 lbs. Patient admits that he would like to lose another 10lbs.     BW done 7/20/23    Taking current medications which were reviewed.  Problem list discussed.    Overall doing well.  Eating okay.  Staying active.    Has no other new problem /question.     ROS  Constitutional- No activity change. No appetite change.  Eyes- Denies vision changes.  Respiratory- No shortness of breath.  Cardiovascular- No palpitations. No chest pain.  GI- No nausea or vomiting. No diarrhea or constipation. Denies abdominal pain.  Musculoskeletal- Denies joint swelling.  Neurological- Denies headaches. Denies dizziness.  Skin- No rashes.  Psychiatric/Behavioral- Denies significant anxiety, or depressed mood.     Objective     /80   Pulse 70   Temp 36.6 °C (97.8 °F) (Temporal)   Resp 18   Ht 1.778 m (5' 10\")   Wt 80.7 kg (178 lb)   SpO2 98%   BMI 25.54 kg/m²     Allergies   Allergen Reactions    Oxycodone Anaphylaxis and Unknown    Morphine Unknown    Penicillins Hives    Prednisone Other       Constitutional-- Well-nourished.  No distress  Head- unremarkable.  Eyes- PERRL.  Conjunctiva normal.  Nose- Normal.  No rhinorrhea noted.  Throat- Oropharynx is clear and moist.  Neck- Supple with no thyromegaly.  No significant " cervical adenopathy noted.  Pulmonary/Chest- Breath sounds normal with normal effort.  No wheezing.  Heart- Regular rate and rhythm.  No murmur.  Abdomen- Soft and non-tender.  No masses noted.  Musculoskeletal-OA changes hands and knees noted.  Extremities-mild leg swelling to above the ankles which is chronic for him  Neurological- Alert.  No noted deficits.  Skin- Warm.  No rashes.  Psychiatric/Behavioral- Mood and affect normal.  Behavior normal.     Assessment/Plan   1. Essential hypertension        2. Other hyperlipidemia        3. Primary osteoarthritis involving multiple joints        4. Chronic pain of right knee        5. Chronic atrial fibrillation (CMS/HCC)               Long talk. Treatment options reviewed.    Reviewed most recent lab work with patient. Advised patient to remain up to date on routine maintenance and health screening.     Hypertension controlled.  Arthritis stable  Discussed importance of natural sources of nutrition.  Advised patient to consume vegetables, salads, fruits, nuts, and proteins such as fish and chicken.  Discussed portion control.      Continue and take your medications as prescribed.    Health Maintenance issues discussed.    Importance of healthy diet and regular exercise regimen discussed.    We will contact you with any test results ordered. If you do not hear from us, please contact.    Follow-up as instructed or sooner if any problems or symptoms do not resolve as expected.      Scribe Attestation  By signing my name below, ILety Scribe   attest that this documentation has been prepared under the direction and in the presence of Wilfrid Frank MD.

## 2024-01-01 DIAGNOSIS — I10 HTN (HYPERTENSION), BENIGN: ICD-10-CM

## 2024-01-02 RX ORDER — AMLODIPINE BESYLATE 5 MG/1
TABLET ORAL
Qty: 180 TABLET | Refills: 1 | Status: SHIPPED | OUTPATIENT
Start: 2024-01-02 | End: 2024-03-15

## 2024-01-12 ENCOUNTER — LAB (OUTPATIENT)
Dept: LAB | Facility: LAB | Age: 89
End: 2024-01-12
Payer: MEDICARE

## 2024-01-12 DIAGNOSIS — C61 MALIGNANT NEOPLASM OF PROSTATE (MULTI): Primary | ICD-10-CM

## 2024-01-12 LAB
PSA SERPL-MCNC: 6.81 NG/ML
TESTOST SERPL-MCNC: 273 NG/DL (ref 240–1000)

## 2024-01-12 PROCEDURE — 84403 ASSAY OF TOTAL TESTOSTERONE: CPT

## 2024-01-12 PROCEDURE — 84153 ASSAY OF PSA TOTAL: CPT

## 2024-01-12 PROCEDURE — 36415 COLL VENOUS BLD VENIPUNCTURE: CPT

## 2024-01-15 DIAGNOSIS — M19.90 ARTHRITIS: ICD-10-CM

## 2024-01-16 RX ORDER — DICLOFENAC SODIUM 75 MG/1
TABLET, DELAYED RELEASE ORAL
Qty: 180 TABLET | Refills: 1 | Status: SHIPPED | OUTPATIENT
Start: 2024-01-16 | End: 2024-03-29

## 2024-03-15 DIAGNOSIS — I10 HTN (HYPERTENSION), BENIGN: ICD-10-CM

## 2024-03-15 RX ORDER — AMLODIPINE BESYLATE 5 MG/1
TABLET ORAL
Qty: 180 TABLET | Refills: 0 | Status: SHIPPED | OUTPATIENT
Start: 2024-03-15 | End: 2024-05-14

## 2024-03-28 DIAGNOSIS — M19.90 ARTHRITIS: ICD-10-CM

## 2024-03-29 RX ORDER — DICLOFENAC SODIUM 75 MG/1
TABLET, DELAYED RELEASE ORAL
Qty: 180 TABLET | Refills: 0 | Status: SHIPPED | OUTPATIENT
Start: 2024-03-29 | End: 2024-05-28

## 2024-04-02 ENCOUNTER — TELEPHONE (OUTPATIENT)
Dept: PRIMARY CARE | Facility: CLINIC | Age: 89
End: 2024-04-02
Payer: MEDICARE

## 2024-04-02 DIAGNOSIS — J06.9 UPPER RESPIRATORY TRACT INFECTION, UNSPECIFIED TYPE: Primary | ICD-10-CM

## 2024-04-02 RX ORDER — AZITHROMYCIN 250 MG/1
TABLET, FILM COATED ORAL
Qty: 6 TABLET | Refills: 0 | Status: SHIPPED | OUTPATIENT
Start: 2024-04-02 | End: 2024-04-06

## 2024-04-02 NOTE — TELEPHONE ENCOUNTER
Pt calling, he has had a sore throat that keeps him up at night for about 2-3 days. He wanted to know if you could prescribe something? He has been taking Pepto bismol and wanted something different. Pt would rather not come in the office. He is not feeling very well.    Please advise    Pharmacy: [facility]

## 2024-05-06 ENCOUNTER — TELEPHONE (OUTPATIENT)
Dept: PRIMARY CARE | Facility: CLINIC | Age: 89
End: 2024-05-06
Payer: MEDICARE

## 2024-05-06 DIAGNOSIS — N30.00 ACUTE CYSTITIS WITHOUT HEMATURIA: Primary | ICD-10-CM

## 2024-05-06 RX ORDER — CEFDINIR 300 MG/1
300 CAPSULE ORAL 2 TIMES DAILY
Qty: 20 CAPSULE | Refills: 0 | Status: SHIPPED | OUTPATIENT
Start: 2024-05-06 | End: 2024-05-16

## 2024-05-06 NOTE — TELEPHONE ENCOUNTER
Pt calling, he states that his urine is orange. He states that he has a UTI. Symptoms started about 2-3 days ago.     He also needs a referral to a new Urologist. Dr. Cota moved to [facility] and that is too far for him.     Also, patient still gets a sore throat at night. His cold keeps coming back. He was treated for this lat month.     Please advise

## 2024-05-06 NOTE — TELEPHONE ENCOUNTER
Pt aware. He has never seen Dr. Lu, but was concerned about walking too far to get to the appointment.

## 2024-05-13 DIAGNOSIS — I10 HTN (HYPERTENSION), BENIGN: ICD-10-CM

## 2024-05-14 RX ORDER — AMLODIPINE BESYLATE 5 MG/1
TABLET ORAL
Qty: 180 TABLET | Refills: 0 | Status: SHIPPED | OUTPATIENT
Start: 2024-05-14 | End: 2024-06-11 | Stop reason: SDUPTHER

## 2024-05-27 DIAGNOSIS — M19.90 ARTHRITIS: ICD-10-CM

## 2024-05-28 RX ORDER — DICLOFENAC SODIUM 75 MG/1
TABLET, DELAYED RELEASE ORAL
Qty: 60 TABLET | Refills: 0 | Status: SHIPPED | OUTPATIENT
Start: 2024-05-28 | End: 2024-06-11 | Stop reason: SDUPTHER

## 2024-06-11 ENCOUNTER — APPOINTMENT (OUTPATIENT)
Dept: PRIMARY CARE | Facility: CLINIC | Age: 89
End: 2024-06-11
Payer: MEDICARE

## 2024-06-11 VITALS
DIASTOLIC BLOOD PRESSURE: 70 MMHG | SYSTOLIC BLOOD PRESSURE: 120 MMHG | WEIGHT: 186 LBS | BODY MASS INDEX: 26.04 KG/M2 | HEART RATE: 56 BPM | OXYGEN SATURATION: 96 % | HEIGHT: 71 IN

## 2024-06-11 DIAGNOSIS — M19.90 ARTHRITIS: ICD-10-CM

## 2024-06-11 DIAGNOSIS — G89.29 CHRONIC PAIN OF RIGHT KNEE: ICD-10-CM

## 2024-06-11 DIAGNOSIS — I10 HTN (HYPERTENSION), BENIGN: ICD-10-CM

## 2024-06-11 DIAGNOSIS — I48.20 CHRONIC ATRIAL FIBRILLATION (MULTI): ICD-10-CM

## 2024-06-11 DIAGNOSIS — I10 ESSENTIAL HYPERTENSION: Primary | ICD-10-CM

## 2024-06-11 DIAGNOSIS — M15.9 PRIMARY OSTEOARTHRITIS INVOLVING MULTIPLE JOINTS: ICD-10-CM

## 2024-06-11 DIAGNOSIS — K64.9 HEMORRHOIDS, UNSPECIFIED HEMORRHOID TYPE: ICD-10-CM

## 2024-06-11 DIAGNOSIS — M25.561 CHRONIC PAIN OF RIGHT KNEE: ICD-10-CM

## 2024-06-11 DIAGNOSIS — I77.9 BILATERAL CAROTID ARTERY DISEASE, UNSPECIFIED TYPE (CMS-HCC): ICD-10-CM

## 2024-06-11 DIAGNOSIS — E78.49 OTHER HYPERLIPIDEMIA: ICD-10-CM

## 2024-06-11 DIAGNOSIS — C61 CA PROSTATE, ADENOCA (MULTI): ICD-10-CM

## 2024-06-11 PROCEDURE — 1159F MED LIST DOCD IN RCRD: CPT | Performed by: FAMILY MEDICINE

## 2024-06-11 PROCEDURE — 1160F RVW MEDS BY RX/DR IN RCRD: CPT | Performed by: FAMILY MEDICINE

## 2024-06-11 PROCEDURE — 1036F TOBACCO NON-USER: CPT | Performed by: FAMILY MEDICINE

## 2024-06-11 PROCEDURE — 1124F ACP DISCUSS-NO DSCNMKR DOCD: CPT | Performed by: FAMILY MEDICINE

## 2024-06-11 PROCEDURE — 1157F ADVNC CARE PLAN IN RCRD: CPT | Performed by: FAMILY MEDICINE

## 2024-06-11 PROCEDURE — 99213 OFFICE O/P EST LOW 20 MIN: CPT | Performed by: FAMILY MEDICINE

## 2024-06-11 PROCEDURE — G2211 COMPLEX E/M VISIT ADD ON: HCPCS | Performed by: FAMILY MEDICINE

## 2024-06-11 PROCEDURE — 3074F SYST BP LT 130 MM HG: CPT | Performed by: FAMILY MEDICINE

## 2024-06-11 PROCEDURE — 3078F DIAST BP <80 MM HG: CPT | Performed by: FAMILY MEDICINE

## 2024-06-11 RX ORDER — SOLIFENACIN SUCCINATE 10 MG/1
1 TABLET, FILM COATED ORAL
COMMUNITY
Start: 2024-05-22

## 2024-06-11 RX ORDER — DICLOFENAC SODIUM 75 MG/1
75 TABLET, DELAYED RELEASE ORAL 2 TIMES DAILY
Qty: 60 TABLET | Refills: 3 | Status: SHIPPED | OUTPATIENT
Start: 2024-06-11

## 2024-06-11 RX ORDER — AMLODIPINE BESYLATE 5 MG/1
10 TABLET ORAL DAILY
Qty: 180 TABLET | Refills: 1 | Status: SHIPPED | OUTPATIENT
Start: 2024-06-11

## 2024-06-11 RX ORDER — HYDROCORTISONE 25 MG/G
CREAM TOPICAL 2 TIMES DAILY PRN
Qty: 30 G | Refills: 2 | Status: SHIPPED | OUTPATIENT
Start: 2024-06-11 | End: 2025-06-11

## 2024-06-11 NOTE — PROGRESS NOTES
"Subjective   Patient ID: Benjamin King is a 89 y.o. male who presents for Hemorrhoids and Hypertension.  HPI    HTN follow up  Denies chest pain,SOB, swelling, headaches, lightheadedness or dizziness.   Eats a generally healthy diet, Exercises.   Does check BP at home.   Currently taking amlodipine     Patient presents in office today for hemorrhoids. Patient admits that this has been ongoing for a couple weeks. Patient has been using neosporin.     Taking current medications which were reviewed.  Problem list discussed.    Overall doing well.  Eating okay.  Staying active.    Has no other new problem /question.     ROS  Constitutional- No activity change. No appetite change.  Eyes- Denies vision changes.  Respiratory- No shortness of breath.  Cardiovascular- No palpitations. No chest pain.  GI- No nausea or vomiting. No diarrhea or constipation. Denies abdominal pain.  Musculoskeletal- Denies joint swelling.  Extremities- No edema.  Neurological- Denies headaches. Denies dizziness.  Psychiatric/Behavioral- Denies significant anxiety, or depressed mood.     Objective     /70   Pulse 56   Ht 1.803 m (5' 11\")   Wt 84.4 kg (186 lb)   SpO2 96%   BMI 25.94 kg/m²     Allergies   Allergen Reactions    Oxycodone Anaphylaxis and Unknown    Morphine Unknown    Penicillins Hives    Prednisone Other       Constitutional-- Well-nourished.  No distress  Eyes- PERRL.  Conjunctiva normal.  Nose- Normal.  No rhinorrhea noted.  Throat- Oropharynx is clear and moist.  Neck- Supple with no thyromegaly.  No significant cervical adenopathy noted.  Pulmonary/Chest- Breath sounds normal with normal effort.  No wheezing.  Heart- Regular rate and rhythm.  No murmur.  Abdomen- Soft and non-tender.  No masses noted.  Musculoskeletal- Normal ROM.  No significant joint swelling  Extremities- No edema.   Neurological- Alert.  No noted deficits.  Skin- Warm.  No rashes.  Psychiatric/Behavioral- Mood and affect normal.  Behavior " normal.     Assessment/Plan   1. Essential hypertension        2. Other hyperlipidemia        3. Hemorrhoids, unspecified hemorrhoid type        4. Primary osteoarthritis involving multiple joints        5. Chronic pain of right knee        6. HTN (hypertension), benign  amLODIPine (Norvasc) 5 mg tablet      7. Arthritis  diclofenac (Voltaren) 75 mg EC tablet      8. Bilateral carotid artery disease, unspecified type (CMS-HCC)        9. Chronic atrial fibrillation (Multi)        10. CA prostate, adenoca (Multi)  hydrocortisone (Anusol-HC) 2.5 % rectal cream             Long talk. Treatment options reviewed.    Reviewed most recent lab work with patient. Advised patient to remain up to date on routine maintenance and health screening.      Discussed hypertension.  Controlled on medications will continue to monitor.     Mild hemorrhoids.  Start cream as instructed  Arthritis stable  Continue and take your medications as prescribed.    Health Maintenance issues discussed.    Importance of healthy diet and regular exercise regimen discussed.    We will contact you with any test results ordered. If you do not hear from us, please contact.    Follow-up as instructed or sooner if any problems or symptoms do not resolve as expected.      Scribe Attestation  By signing my name below, ILety Scribe   attest that this documentation has been prepared under the direction and in the presence of Wilfrid Frank MD.

## 2024-06-12 ENCOUNTER — TELEPHONE (OUTPATIENT)
Dept: PRIMARY CARE | Facility: CLINIC | Age: 89
End: 2024-06-12
Payer: MEDICARE

## 2024-06-12 DIAGNOSIS — R25.1 TREMOR: Primary | ICD-10-CM

## 2024-06-12 RX ORDER — PRIMIDONE 50 MG/1
50 TABLET ORAL NIGHTLY
Qty: 30 TABLET | Refills: 2 | Status: SHIPPED | OUTPATIENT
Start: 2024-06-12 | End: 2024-09-10

## 2024-06-12 NOTE — TELEPHONE ENCOUNTER
PATIENT IS CALLING - SAW YOU YESTERDAY AND FORGOT TO ASK ABOUT TREMORS HE IS HAVING IN RIGHT HAND - REPORTS THAT HAND OPENS AND CLOSES ON ITS OWN? WONDERING IF HE NEEDS ANOTHER APPOINTMENT WITH YOU TO SHOW YOU OR IS THERE SOMETHING YOU CAN SUGGEST?  PLEASE ADVISE.

## 2024-06-19 NOTE — PROGRESS NOTES
Subjective   Patient ID: Benjamin King is a 89 y.o. male new patient who presents for prostate cancer care    HPI  The patient was diagnosed with prostate cancer 3 years ago, he was treated with Lupron depot, last on in October 2023. He denies any surgeries or radiation. He got Eligard shot in April of 2024.    Patient does note urinary symptoms. His flow is fine. Patient denies hematuria and dysuria. Patient notes he is  having trouble with control or frequency. He voids every 30 minutes- 1 hours. He has been utilizing solifenacin with some relief.       PSA Results  Component  Ref Range & Units 5 mo ago  (1/12/24) 9 mo ago  (9/22/23) 1 yr ago  (6/9/23) 1 yr ago  (12/9/22) 2 yr ago  (5/20/22) 2 yr ago  (1/7/22) 2 yr ago  (8/17/21)   Prostate Specific AG  <=4.00 ng/mL 6.81 High  20.01 High  R, CM 2.78 R, CM 4.53 High  R, CM 0.14 R, CM 0.40 R, CM 13.90 High      Past Medical History  Past Medical History:   Diagnosis Date    Body mass index (BMI) 29.0-29.9, adult 06/08/2021    BMI 29.0-29.9,adult    Chills (without fever) 02/15/2022    Chills    Encounter for general adult medical examination without abnormal findings 02/15/2022    Medicare annual wellness visit, subsequent    Encounter for general adult medical examination without abnormal findings 06/23/2020    Medicare annual wellness visit, subsequent    Encounter for immunization     Encounter for immunization    Encounter for screening for malignant neoplasm of prostate     Encounter for prostate cancer screening    Other conditions influencing health status 08/17/2021    History of cough    Other specified symptoms and signs involving the circulatory and respiratory systems     Chest congestion    Personal history of malignant neoplasm of prostate     History of malignant neoplasm of prostate    Personal history of other diseases of the musculoskeletal system and connective tissue 04/13/2021    History of tendinitis    Personal history of other diseases of  the nervous system and sense organs 12/17/2019    History of acute conjunctivitis    Personal history of other diseases of the respiratory system 06/13/2021    History of upper respiratory infection    Personal history of other diseases of the respiratory system 06/13/2022    History of bronchitis    Personal history of other drug therapy 12/17/2019    History of pneumococcal vaccination    Personal history of urinary (tract) infections 06/08/2021    History of urinary tract infection        Surgical History  Past Surgical History:   Procedure Laterality Date    OTHER SURGICAL HISTORY  07/16/2019    Knee replacement    OTHER SURGICAL HISTORY  07/16/2019    Abdominal surgery    OTHER SURGICAL HISTORY  07/16/2019    Tonsillectomy         Social History  He reports that he has quit smoking. His smoking use included cigarettes. He has never used smokeless tobacco. He reports that he does not currently use alcohol. He reports that he does not use drugs.    Family History  No family history on file.    Medications    Current Outpatient Medications:     amLODIPine (Norvasc) 5 mg tablet, Take 2 tablets (10 mg) by mouth once daily., Disp: 180 tablet, Rfl: 1    aspirin 81 mg EC tablet, Take 1 tablet (81 mg) by mouth once daily., Disp: , Rfl:     diclofenac (Voltaren) 75 mg EC tablet, Take 1 tablet (75 mg) by mouth 2 times a day. Do not crush, chew, or split., Disp: 60 tablet, Rfl: 3    hydrocortisone (Anusol-HC) 2.5 % rectal cream, Insert into the rectum 2 times a day as needed for hemorrhoids (rectal discomfort). Apply to affected areas, Disp: 30 g, Rfl: 2    meclizine (Antivert) 25 mg tablet, TAKE 1 TABLET BY MOUTH 3 TIMES  DAILY AS NEEDED, Disp: 270 tablet, Rfl: 0    primidone (Mysoline) 50 mg tablet, Take 1 tablet (50 mg) by mouth once daily at bedtime., Disp: 30 tablet, Rfl: 2    solifenacin (VESIcare) 10 mg tablet, Take 1 tablet (10 mg) by mouth early in the morning.., Disp: , Rfl:      Allergies  Oxycodone, Morphine,  Penicillins, and Prednisone     Review of Systems  A 12 system review was completed and is negative with the exception of those signs and symptoms noted in the history of present illness.    Objective   Physical Exam  General: in NAD, appears stated age  Head: normocephalic, atraumatic  Neck: supple; trachea is midline  Respiratory: normal effort, no use of accessory muscles  Cardiovascular: no peripheral edema  Abdomen: soft, nondistended, nontender, no rebound or guarding, no organomegaly, no CVA tenderness, no hernia  Lymphatic: no lymphadenopathy noted  Skin: normal turgor, no rashes  Neurologic: grossly intact, oriented to person/place/time  Psychiatric: mode and affect appropriate  : normal phallus, normal meatus, scrotum normal, testicles normal bilaterally, epididymis normal bilaterally  KARISHMA: normal tone, no hemorrhoids, prostate smooth/nontender/no nodules    Assessment/Plan   Problem List Items Addressed This Visit    None  Visit Diagnoses       Prostate cancer (Multi)    -  Primary          We discussed the role of PSA in following his prostate cancer. I recommend that we get a repeat PSA. Will see the possibility of getting 6 month repeat Eligard injection approved by his insurance. Will try and get old records.    Follow-up in September for continued management of Prostate Cancer.     Scribe Attestation  By signing my name below, IRadha Scribe   attest that this documentation has been prepared under the direction and in the presence of Hari Pineda MD.    Scribe Attestation  By signing my name below, IJennifer Scribe attest that this documentation has been prepared under the direction and in the presence of Hari Pineda MD. All medical record entries made by the Scribtommy were at my direction or personally dictated by me. I have reviewed the chart and agree that the record accurately reflects my personal performance of the history, physical exam, discussion and plan.

## 2024-06-20 ENCOUNTER — APPOINTMENT (OUTPATIENT)
Dept: UROLOGY | Facility: CLINIC | Age: 89
End: 2024-06-20
Payer: MEDICARE

## 2024-06-20 VITALS — SYSTOLIC BLOOD PRESSURE: 126 MMHG | DIASTOLIC BLOOD PRESSURE: 78 MMHG | TEMPERATURE: 98.1 F | HEART RATE: 59 BPM

## 2024-06-20 DIAGNOSIS — C61 PROSTATE CANCER (MULTI): Primary | ICD-10-CM

## 2024-06-20 PROCEDURE — 3074F SYST BP LT 130 MM HG: CPT | Performed by: UROLOGY

## 2024-06-20 PROCEDURE — 1157F ADVNC CARE PLAN IN RCRD: CPT | Performed by: UROLOGY

## 2024-06-20 PROCEDURE — 3078F DIAST BP <80 MM HG: CPT | Performed by: UROLOGY

## 2024-06-20 PROCEDURE — 99204 OFFICE O/P NEW MOD 45 MIN: CPT | Performed by: UROLOGY

## 2024-06-20 PROCEDURE — 1159F MED LIST DOCD IN RCRD: CPT | Performed by: UROLOGY

## 2024-06-20 RX ORDER — LEUPROLIDE ACETATE 45 MG
45 KIT SUBCUTANEOUS
Qty: 1 EACH | Refills: 1 | OUTPATIENT
Start: 2024-10-01 | End: 2025-10-01

## 2024-06-24 DIAGNOSIS — C61 PROSTATE CANCER (MULTI): ICD-10-CM

## 2024-06-26 RX ORDER — LEUPROLIDE ACETATE 45 MG
45 KIT SUBCUTANEOUS
Qty: 1 EACH | Refills: 1 | Status: SHIPPED | OUTPATIENT
Start: 2024-06-26 | End: 2025-06-26

## 2024-06-27 ENCOUNTER — SPECIALTY PHARMACY (OUTPATIENT)
Dept: PHARMACY | Facility: CLINIC | Age: 89
End: 2024-06-27

## 2024-07-02 ENCOUNTER — LAB (OUTPATIENT)
Dept: LAB | Facility: LAB | Age: 89
End: 2024-07-02
Payer: MEDICARE

## 2024-07-02 DIAGNOSIS — C61 PROSTATE CANCER (MULTI): ICD-10-CM

## 2024-07-02 LAB — PSA SERPL-MCNC: 7.96 NG/ML

## 2024-07-02 PROCEDURE — 84153 ASSAY OF PSA TOTAL: CPT

## 2024-07-02 PROCEDURE — 36415 COLL VENOUS BLD VENIPUNCTURE: CPT

## 2024-08-29 ENCOUNTER — OFFICE VISIT (OUTPATIENT)
Dept: CARDIOLOGY CLINIC | Age: 89
End: 2024-08-29
Payer: MEDICARE

## 2024-08-29 VITALS
WEIGHT: 178 LBS | SYSTOLIC BLOOD PRESSURE: 110 MMHG | HEART RATE: 65 BPM | BODY MASS INDEX: 25.54 KG/M2 | DIASTOLIC BLOOD PRESSURE: 70 MMHG

## 2024-08-29 DIAGNOSIS — I48.0 PAROXYSMAL ATRIAL FIBRILLATION (HCC): Primary | ICD-10-CM

## 2024-08-29 DIAGNOSIS — I10 ESSENTIAL HYPERTENSION: ICD-10-CM

## 2024-08-29 DIAGNOSIS — Z01.818 PRE-OPERATIVE CLEARANCE: ICD-10-CM

## 2024-08-29 PROCEDURE — 93000 ELECTROCARDIOGRAM COMPLETE: CPT | Performed by: INTERNAL MEDICINE

## 2024-08-29 PROCEDURE — 99214 OFFICE O/P EST MOD 30 MIN: CPT | Performed by: INTERNAL MEDICINE

## 2024-08-29 PROCEDURE — 1123F ACP DISCUSS/DSCN MKR DOCD: CPT | Performed by: INTERNAL MEDICINE

## 2024-08-29 RX ORDER — LEUPROLIDE ACETATE 45 MG
45 KIT SUBCUTANEOUS
COMMUNITY
Start: 2024-06-26 | End: 2025-06-26

## 2024-08-29 ASSESSMENT — ENCOUNTER SYMPTOMS
ABDOMINAL PAIN: 0
SHORTNESS OF BREATH: 0
EYES NEGATIVE: 1
VOMITING: 0
GASTROINTESTINAL NEGATIVE: 1
WHEEZING: 0
ALLERGIC/IMMUNOLOGIC NEGATIVE: 1
NAUSEA: 0

## 2024-08-29 NOTE — PROGRESS NOTES
8/29/2024      HPI:      HPI     2-1-17 with Kaila: Patient here for hospital follow-up from recent admission to [facility] for CVA and ? new onset atrial fibrillation. Reportedly when patient initially presented to [facility] ER he was noted to be in atrial fibrillation; however, upon review of cardiology consult done at [facility] patient's initial rhythm appeared to be SR with PACs. EKG from [facility] unavailable to me at this time. Had bilateral carotid US from 1/7/17 revealed 50-69% bilateral ICA stenosis. MRI brain 1/7/17 showed there is a focal area of diffusion restriction in the right periventricular deep white matter consistent with an acute ischemic nonhemorrhagic infarct. Was later discharge to [place] rehab facility and discharged from there after 1.5 weeks of rehab.     Has been doing well since discharge with no complaints overall other than occasionally lower BP with lowest BP reading of 108/71. Patient has occasionally not been taking his Toprol XL 50mg PO daily and amlodipine 5mg PO daily if his SBP isn't above 140-160 range. Will experience dizziness and lightheadedness occasionally when SBP in low 100s. Denies chest pain/SOB/orthopnea/syncope/fever/chills.  Has been compliant with xarelto. No bleeding issues.      3-2-17: s/p normal 48 hour holter monitor. States his BP is 160-170 in am and PM. Pt denies chest pain, dyspnea, dyspnea on exertion, change in exercise capacity, fatigue,  nausea, vomiting, diarrhea, constipation, motor weakness, insomnia, weight loss, syncope, dizziness, lightheadedness, palpitations, PND, orthopnea, or claudication.  No nitro use. BP and hr are good. CAD is stable. No LE discoloration or ulcers. No LE edema. No CHF type symptoms. Lipid profile is normal.      S/p LOUISA in 1/2017:  Conclusions   Summary   No mass, thrombus or vegetation.   Mild (1+) mitral regurgitation is present.   Mild aortic regurgitation is noted.   No evidence of aortic stenosis.

## 2024-09-10 ENCOUNTER — APPOINTMENT (OUTPATIENT)
Dept: PRIMARY CARE | Facility: CLINIC | Age: 89
End: 2024-09-10
Payer: MEDICARE

## 2024-09-10 VITALS
RESPIRATION RATE: 16 BRPM | WEIGHT: 176 LBS | OXYGEN SATURATION: 98 % | BODY MASS INDEX: 24.64 KG/M2 | HEART RATE: 63 BPM | TEMPERATURE: 96.9 F | DIASTOLIC BLOOD PRESSURE: 80 MMHG | HEIGHT: 71 IN | SYSTOLIC BLOOD PRESSURE: 112 MMHG

## 2024-09-10 DIAGNOSIS — C61 CA PROSTATE, ADENOCA (MULTI): ICD-10-CM

## 2024-09-10 DIAGNOSIS — E78.49 OTHER HYPERLIPIDEMIA: ICD-10-CM

## 2024-09-10 DIAGNOSIS — R32 URINARY INCONTINENCE, UNSPECIFIED TYPE: ICD-10-CM

## 2024-09-10 DIAGNOSIS — I48.20 CHRONIC ATRIAL FIBRILLATION (MULTI): ICD-10-CM

## 2024-09-10 DIAGNOSIS — M15.9 PRIMARY OSTEOARTHRITIS INVOLVING MULTIPLE JOINTS: ICD-10-CM

## 2024-09-10 DIAGNOSIS — I10 ESSENTIAL HYPERTENSION: Primary | ICD-10-CM

## 2024-09-10 PROCEDURE — 99214 OFFICE O/P EST MOD 30 MIN: CPT | Performed by: FAMILY MEDICINE

## 2024-09-10 PROCEDURE — 1157F ADVNC CARE PLAN IN RCRD: CPT | Performed by: FAMILY MEDICINE

## 2024-09-10 PROCEDURE — 3079F DIAST BP 80-89 MM HG: CPT | Performed by: FAMILY MEDICINE

## 2024-09-10 PROCEDURE — 1159F MED LIST DOCD IN RCRD: CPT | Performed by: FAMILY MEDICINE

## 2024-09-10 PROCEDURE — 1124F ACP DISCUSS-NO DSCNMKR DOCD: CPT | Performed by: FAMILY MEDICINE

## 2024-09-10 PROCEDURE — 3074F SYST BP LT 130 MM HG: CPT | Performed by: FAMILY MEDICINE

## 2024-09-10 RX ORDER — MIRABEGRON 50 MG/1
50 TABLET, FILM COATED, EXTENDED RELEASE ORAL DAILY
Qty: 30 TABLET | Refills: 3 | Status: SHIPPED | OUTPATIENT
Start: 2024-09-10

## 2024-09-10 RX ORDER — SOLIFENACIN SUCCINATE 10 MG/1
10 TABLET, FILM COATED ORAL
Qty: 90 TABLET | Refills: 0 | Status: SHIPPED | OUTPATIENT
Start: 2024-09-10

## 2024-09-10 ASSESSMENT — ANXIETY QUESTIONNAIRES
GAD7 TOTAL SCORE: 0
6. BECOMING EASILY ANNOYED OR IRRITABLE: NOT AT ALL
IF YOU CHECKED OFF ANY PROBLEMS ON THIS QUESTIONNAIRE, HOW DIFFICULT HAVE THESE PROBLEMS MADE IT FOR YOU TO DO YOUR WORK, TAKE CARE OF THINGS AT HOME, OR GET ALONG WITH OTHER PEOPLE: NOT DIFFICULT AT ALL
4. TROUBLE RELAXING: NOT AT ALL
2. NOT BEING ABLE TO STOP OR CONTROL WORRYING: NOT AT ALL
7. FEELING AFRAID AS IF SOMETHING AWFUL MIGHT HAPPEN: NOT AT ALL
5. BEING SO RESTLESS THAT IT IS HARD TO SIT STILL: NOT AT ALL
3. WORRYING TOO MUCH ABOUT DIFFERENT THINGS: NOT AT ALL
1. FEELING NERVOUS, ANXIOUS, OR ON EDGE: NOT AT ALL

## 2024-09-10 ASSESSMENT — PATIENT HEALTH QUESTIONNAIRE - PHQ9
1. LITTLE INTEREST OR PLEASURE IN DOING THINGS: NOT AT ALL
2. FEELING DOWN, DEPRESSED OR HOPELESS: NOT AT ALL
SUM OF ALL RESPONSES TO PHQ9 QUESTIONS 1 & 2: 0

## 2024-09-10 ASSESSMENT — ENCOUNTER SYMPTOMS
DEPRESSION: 0
LOSS OF SENSATION IN FEET: 0
OCCASIONAL FEELINGS OF UNSTEADINESS: 0

## 2024-09-10 NOTE — PROGRESS NOTES
"Subjective   Patient ID: Benjamin King is a 89 y.o. male who presents for Hypertension and Hyperlipidemia.  HPI  Patient presents in office today for HTN and HLD follow up. Tries to follow a low sugar, low sodium, low fat diet. Stays active. Denies any side effects from medications.      Patient is wondering ir he can take the Vesicare in the morning and evening due to incontinence.       Taking current medications which were reviewed.  Problem list discussed.    Overall doing well.  Eating okay.  Staying active.    Has no other new problem /question.     ROS  Constitutional- No activity change. No appetite change.  Eyes- Denies vision changes.  Respiratory- No shortness of breath.  Cardiovascular- No palpitations. No chest pain.  GI- No nausea or vomiting. No diarrhea or constipation. Denies abdominal pain.  Musculoskeletal- Denies joint swelling.  Neurological- Denies headaches. Denies dizziness.  Skin- No rashes.  Psychiatric/Behavioral- Denies significant anxiety, or depressed mood.     Objective     /80   Pulse 63   Temp 36.1 °C (96.9 °F)   Resp 16   Ht 1.803 m (5' 11\")   Wt 79.8 kg (176 lb)   SpO2 98%   BMI 24.55 kg/m²     Allergies   Allergen Reactions    Oxycodone Anaphylaxis    Penicillins Hives    Morphine Unknown    Prednisone Unknown       Constitutional-- Well-nourished.  No distress  Head- unremarkable.  Eyes- PERRL.  Conjunctiva normal.  Nose- Normal.  No rhinorrhea noted.  Throat- Oropharynx is clear and moist.  Neck- Supple with no thyromegaly.  No significant cervical adenopathy noted.  Pulmonary/Chest- Breath sounds normal with normal effort.  No wheezing.  Heart- Regular rate and rhythm.  No murmur.  Abdomen- Soft and non-tender.  No masses noted.  Musculoskeletal-OA changes hands and knees noted.  Extremities-mild leg swelling to above the ankles chronic  Neurological- Alert.  No noted deficits.  Skin- Warm.  No rashes.  Psychiatric/Behavioral- Mood and affect normal.  Behavior " normal.     Assessment/Plan   1. Essential hypertension  CBC and Auto Differential    Comprehensive metabolic panel      2. Other hyperlipidemia  Lipid panel      3. Urinary incontinence, unspecified type  solifenacin (VESIcare) 10 mg tablet    mirabegron (Myrbetriq) 50 mg tablet extended release 24 hr 24 hr tablet      4. Chronic atrial fibrillation (Multi)        5. CA prostate, adenoca (Multi)        6. Primary osteoarthritis involving multiple joints               Long talk. Treatment options reviewed.    Continue and take your medications as prescribed.    Discussed urinary incontinence.  Discussed related symptoms.  Can only take Vesicare once a day.  Given Myrbetriq samples to titrate up to 50 mg daily.  If effective he will get the prescription take Medication as discussed.      Discussed hypertension.  Controlled on meds    A-fib stable.  Arthritis stable.  Prostate cancer in remission    Health Maintenance issues discussed.    Importance of healthy diet and regular exercise regimen discussed.    We will contact you with any test results ordered. If you do not hear from us, please contact.    Follow-up as instructed or sooner if any problems or symptoms do not resolve as expected.       Scribe Attestation  By signing my name below, ILety Scribe   attest that this documentation has been prepared under the direction and in the presence of Wilfrid Frank MD.

## 2024-09-13 ENCOUNTER — APPOINTMENT (OUTPATIENT)
Dept: RADIOLOGY | Facility: HOSPITAL | Age: 89
End: 2024-09-13
Payer: MEDICARE

## 2024-09-13 ENCOUNTER — HOSPITAL ENCOUNTER (EMERGENCY)
Facility: HOSPITAL | Age: 89
Discharge: HOME | End: 2024-09-13
Attending: STUDENT IN AN ORGANIZED HEALTH CARE EDUCATION/TRAINING PROGRAM
Payer: MEDICARE

## 2024-09-13 VITALS
BODY MASS INDEX: 24.92 KG/M2 | OXYGEN SATURATION: 98 % | WEIGHT: 178 LBS | SYSTOLIC BLOOD PRESSURE: 173 MMHG | TEMPERATURE: 98.6 F | DIASTOLIC BLOOD PRESSURE: 87 MMHG | RESPIRATION RATE: 18 BRPM | HEART RATE: 65 BPM | HEIGHT: 71 IN

## 2024-09-13 DIAGNOSIS — M54.50 ACUTE LOW BACK PAIN WITHOUT SCIATICA, UNSPECIFIED BACK PAIN LATERALITY: Primary | ICD-10-CM

## 2024-09-13 DIAGNOSIS — N20.0 NEPHROLITHIASIS: ICD-10-CM

## 2024-09-13 DIAGNOSIS — N23 RENAL COLIC ON RIGHT SIDE: ICD-10-CM

## 2024-09-13 LAB
ALBUMIN SERPL BCP-MCNC: 3.9 G/DL (ref 3.4–5)
ALP SERPL-CCNC: 77 U/L (ref 33–136)
ALT SERPL W P-5'-P-CCNC: 14 U/L (ref 10–52)
ANION GAP SERPL CALC-SCNC: 15 MMOL/L (ref 10–20)
APPEARANCE UR: CLEAR
AST SERPL W P-5'-P-CCNC: 17 U/L (ref 9–39)
BASOPHILS # BLD AUTO: 0.15 X10*3/UL (ref 0–0.1)
BASOPHILS NFR BLD AUTO: 1.6 %
BILIRUB SERPL-MCNC: 1.2 MG/DL (ref 0–1.2)
BILIRUB UR STRIP.AUTO-MCNC: NEGATIVE MG/DL
BUN SERPL-MCNC: 25 MG/DL (ref 6–23)
CALCIUM SERPL-MCNC: 9 MG/DL (ref 8.6–10.3)
CHLORIDE SERPL-SCNC: 106 MMOL/L (ref 98–107)
CO2 SERPL-SCNC: 22 MMOL/L (ref 21–32)
COLOR UR: YELLOW
CREAT SERPL-MCNC: 0.9 MG/DL (ref 0.5–1.3)
EGFRCR SERPLBLD CKD-EPI 2021: 82 ML/MIN/1.73M*2
EOSINOPHIL # BLD AUTO: 0.74 X10*3/UL (ref 0–0.4)
EOSINOPHIL NFR BLD AUTO: 8 %
ERYTHROCYTE [DISTWIDTH] IN BLOOD BY AUTOMATED COUNT: 14.5 % (ref 11.5–14.5)
GLUCOSE SERPL-MCNC: 93 MG/DL (ref 74–99)
GLUCOSE UR STRIP.AUTO-MCNC: NORMAL MG/DL
HCT VFR BLD AUTO: 35.2 % (ref 41–52)
HGB BLD-MCNC: 11.8 G/DL (ref 13.5–17.5)
HOLD SPECIMEN: NORMAL
HOLD SPECIMEN: NORMAL
IMM GRANULOCYTES # BLD AUTO: 0.02 X10*3/UL (ref 0–0.5)
IMM GRANULOCYTES NFR BLD AUTO: 0.2 % (ref 0–0.9)
KETONES UR STRIP.AUTO-MCNC: NEGATIVE MG/DL
LEUKOCYTE ESTERASE UR QL STRIP.AUTO: NEGATIVE
LYMPHOCYTES # BLD AUTO: 1.75 X10*3/UL (ref 0.8–3)
LYMPHOCYTES NFR BLD AUTO: 19 %
MCH RBC QN AUTO: 29.6 PG (ref 26–34)
MCHC RBC AUTO-ENTMCNC: 33.5 G/DL (ref 32–36)
MCV RBC AUTO: 88 FL (ref 80–100)
MONOCYTES # BLD AUTO: 1.09 X10*3/UL (ref 0.05–0.8)
MONOCYTES NFR BLD AUTO: 11.8 %
MUCOUS THREADS #/AREA URNS AUTO: ABNORMAL /LPF
NEUTROPHILS # BLD AUTO: 5.46 X10*3/UL (ref 1.6–5.5)
NEUTROPHILS NFR BLD AUTO: 59.4 %
NITRITE UR QL STRIP.AUTO: NEGATIVE
NRBC BLD-RTO: 0 /100 WBCS (ref 0–0)
PH UR STRIP.AUTO: 7 [PH]
PLATELET # BLD AUTO: 262 X10*3/UL (ref 150–450)
POTASSIUM SERPL-SCNC: 3.7 MMOL/L (ref 3.5–5.3)
PROT SERPL-MCNC: 7.1 G/DL (ref 6.4–8.2)
PROT UR STRIP.AUTO-MCNC: NEGATIVE MG/DL
RBC # BLD AUTO: 3.99 X10*6/UL (ref 4.5–5.9)
RBC # UR STRIP.AUTO: ABNORMAL /UL
RBC #/AREA URNS AUTO: >20 /HPF
SODIUM SERPL-SCNC: 139 MMOL/L (ref 136–145)
SP GR UR STRIP.AUTO: 1.01
UROBILINOGEN UR STRIP.AUTO-MCNC: ABNORMAL MG/DL
WBC # BLD AUTO: 9.2 X10*3/UL (ref 4.4–11.3)
WBC #/AREA URNS AUTO: ABNORMAL /HPF

## 2024-09-13 PROCEDURE — 99285 EMERGENCY DEPT VISIT HI MDM: CPT | Performed by: STUDENT IN AN ORGANIZED HEALTH CARE EDUCATION/TRAINING PROGRAM

## 2024-09-13 PROCEDURE — 2500000004 HC RX 250 GENERAL PHARMACY W/ HCPCS (ALT 636 FOR OP/ED): Performed by: STUDENT IN AN ORGANIZED HEALTH CARE EDUCATION/TRAINING PROGRAM

## 2024-09-13 PROCEDURE — 81001 URINALYSIS AUTO W/SCOPE: CPT | Performed by: STUDENT IN AN ORGANIZED HEALTH CARE EDUCATION/TRAINING PROGRAM

## 2024-09-13 PROCEDURE — 80053 COMPREHEN METABOLIC PANEL: CPT

## 2024-09-13 PROCEDURE — 74177 CT ABD & PELVIS W/CONTRAST: CPT | Mod: FOREIGN READ | Performed by: RADIOLOGY

## 2024-09-13 PROCEDURE — 36415 COLL VENOUS BLD VENIPUNCTURE: CPT

## 2024-09-13 PROCEDURE — 74177 CT ABD & PELVIS W/CONTRAST: CPT

## 2024-09-13 PROCEDURE — 2550000001 HC RX 255 CONTRASTS: Performed by: STUDENT IN AN ORGANIZED HEALTH CARE EDUCATION/TRAINING PROGRAM

## 2024-09-13 PROCEDURE — 96374 THER/PROPH/DIAG INJ IV PUSH: CPT | Mod: 59

## 2024-09-13 PROCEDURE — 85025 COMPLETE CBC W/AUTO DIFF WBC: CPT

## 2024-09-13 PROCEDURE — 99284 EMERGENCY DEPT VISIT MOD MDM: CPT

## 2024-09-13 RX ORDER — KETOROLAC TROMETHAMINE 15 MG/ML
15 INJECTION, SOLUTION INTRAMUSCULAR; INTRAVENOUS ONCE
Status: COMPLETED | OUTPATIENT
Start: 2024-09-13 | End: 2024-09-13

## 2024-09-13 RX ORDER — TAMSULOSIN HYDROCHLORIDE 0.4 MG/1
0.4 CAPSULE ORAL DAILY
Qty: 14 CAPSULE | Refills: 0 | Status: SHIPPED | OUTPATIENT
Start: 2024-09-13 | End: 2024-09-27

## 2024-09-13 RX ORDER — ONDANSETRON 4 MG/1
4 TABLET, ORALLY DISINTEGRATING ORAL EVERY 8 HOURS PRN
Qty: 15 TABLET | Refills: 0 | Status: SHIPPED | OUTPATIENT
Start: 2024-09-13 | End: 2024-09-20

## 2024-09-13 ASSESSMENT — LIFESTYLE VARIABLES
HAVE YOU EVER FELT YOU SHOULD CUT DOWN ON YOUR DRINKING: NO
EVER FELT BAD OR GUILTY ABOUT YOUR DRINKING: NO
EVER HAD A DRINK FIRST THING IN THE MORNING TO STEADY YOUR NERVES TO GET RID OF A HANGOVER: NO
HAVE PEOPLE ANNOYED YOU BY CRITICIZING YOUR DRINKING: NO
TOTAL SCORE: 0

## 2024-09-13 ASSESSMENT — PAIN - FUNCTIONAL ASSESSMENT
PAIN_FUNCTIONAL_ASSESSMENT: 0-10
PAIN_FUNCTIONAL_ASSESSMENT: 0-10

## 2024-09-13 ASSESSMENT — PAIN SCALES - GENERAL
PAINLEVEL_OUTOF10: 10 - WORST POSSIBLE PAIN
PAINLEVEL_OUTOF10: 5 - MODERATE PAIN
PAINLEVEL_OUTOF10: 2

## 2024-09-13 ASSESSMENT — PAIN DESCRIPTION - LOCATION: LOCATION: BACK

## 2024-09-13 ASSESSMENT — COLUMBIA-SUICIDE SEVERITY RATING SCALE - C-SSRS
2. HAVE YOU ACTUALLY HAD ANY THOUGHTS OF KILLING YOURSELF?: NO
1. IN THE PAST MONTH, HAVE YOU WISHED YOU WERE DEAD OR WISHED YOU COULD GO TO SLEEP AND NOT WAKE UP?: NO
6. HAVE YOU EVER DONE ANYTHING, STARTED TO DO ANYTHING, OR PREPARED TO DO ANYTHING TO END YOUR LIFE?: NO

## 2024-09-13 NOTE — DISCHARGE INSTRUCTIONS
Please call your primary care provider and urology and schedule a follow up appointment as soon as possible.     Please take Zofran 1 tablet every 8 hours as needed for nausea and vomiting and Flomax 1 tablet daily for 2 weeks.    If you have any worsening symptoms of back pain, difficulty urinating, severe nausea and vomiting to the point you cannot keep anything down, loss control of your bowels and bladder, numbness/tinglingness and weakness in your legs, high fevers, chest pain, shortness of breath, or any other concerning symptoms, please return to the ER.      Thank you for allowing us to participate in your health care.    -[facility] Medicine Service.

## 2024-09-13 NOTE — ED PROVIDER NOTES
EMERGENCY DEPARTMENT ENCOUNTER      Pt Name: Benjamin King  MRN: [id number]  Birthdate 1935  Date of evaluation: 9/13/2024  Provider: Brenda Malhotra DO    CHIEF COMPLAINT       Chief Complaint   Patient presents with    Back Pain         HISTORY OF PRESENT ILLNESS    89-year-old male with past medical history of prostate cancer currently on leuprolide and no prior radiation therapy and nephrolithiasis, who presents to the ED with back pain.  The patient states 2 days ago, he developed a sudden onset of low back pain while putting his shoes on.  Pain radiates to right lower abdomen.  He says it hurts to bend over and move around.  He has been taking Aleve with relief.  He reports he has constipation and chronic urinary numbness and frequency though has not worsened after developing a low back pain.  He denies any difficulty ambulating, numbness in the buttocks area, numbness/weakness in bilateral leg, loss of bowel control, dysuria, hematuria, difficulty urinating.  He denies recent fall.  He says his pain reminded him of his prior kidney stone.      History provided by:  Patient and medical records   used: No      Nursing Notes were reviewed.    PAST MEDICAL HISTORY     Past Medical History:   Diagnosis Date    Body mass index (BMI) 29.0-29.9, adult 06/08/2021    BMI 29.0-29.9,adult    Chills (without fever) 02/15/2022    Chills    Encounter for general adult medical examination without abnormal findings 02/15/2022    Medicare annual wellness visit, subsequent    Encounter for general adult medical examination without abnormal findings 06/23/2020    Medicare annual wellness visit, subsequent    Encounter for immunization     Encounter for immunization    Encounter for screening for malignant neoplasm of prostate     Encounter for prostate cancer screening    Other conditions influencing health status 08/17/2021    History of cough    Other specified symptoms and signs involving the  circulatory and respiratory systems     Chest congestion    Personal history of malignant neoplasm of prostate     History of malignant neoplasm of prostate    Personal history of other diseases of the musculoskeletal system and connective tissue 04/13/2021    History of tendinitis    Personal history of other diseases of the nervous system and sense organs 12/17/2019    History of acute conjunctivitis    Personal history of other diseases of the respiratory system 06/13/2021    History of upper respiratory infection    Personal history of other diseases of the respiratory system 06/13/2022    History of bronchitis    Personal history of other drug therapy 12/17/2019    History of pneumococcal vaccination    Personal history of urinary (tract) infections 06/08/2021    History of urinary tract infection         SURGICAL HISTORY       Past Surgical History:   Procedure Laterality Date    OTHER SURGICAL HISTORY  07/16/2019    Knee replacement    OTHER SURGICAL HISTORY  07/16/2019    Abdominal surgery    OTHER SURGICAL HISTORY  07/16/2019    Tonsillectomy         CURRENT MEDICATIONS       Discharge Medication List as of 9/13/2024  5:16 PM        CONTINUE these medications which have NOT CHANGED    Details   amLODIPine (Norvasc) 5 mg tablet Take 2 tablets (10 mg) by mouth once daily., Starting Tue 6/11/2024, Normal      aspirin 81 mg EC tablet Take 1 tablet (81 mg) by mouth once daily., Starting Wed 7/17/2019, Historical Med      diclofenac (Voltaren) 75 mg EC tablet Take 1 tablet (75 mg) by mouth 2 times a day. Do not crush, chew, or split., Starting Tue 6/11/2024, Normal      hydrocortisone (Anusol-HC) 2.5 % rectal cream Insert into the rectum 2 times a day as needed for hemorrhoids (rectal discomfort). Apply to affected areas, Starting Tue 6/11/2024, Until Wed 6/11/2025 at 2359, Normal      !! leuprolide, 6 month, 45 mg injection Inject 45 mg under the skin every 6 months. Do not fill before October 1, 2024., Starting  Tue 10/1/2024, Until Wed 10/1/2025, Print      !! leuprolide, 6 month, 45 mg injection Inject 45 mg under the skin every 6 months., Starting Wed 6/26/2024, Until Thu 6/26/2025, Normal      meclizine (Antivert) 25 mg tablet TAKE 1 TABLET BY MOUTH 3 TIMES  DAILY AS NEEDED, Normal      mirabegron (Myrbetriq) 50 mg tablet extended release 24 hr 24 hr tablet Take 1 tablet (50 mg) by mouth once daily., Starting Tue 9/10/2024, Print      primidone (Mysoline) 50 mg tablet Take 1 tablet (50 mg) by mouth once daily at bedtime., Starting Wed 6/12/2024, Until Tue 9/10/2024, Normal      solifenacin (VESIcare) 10 mg tablet Take 1 tablet (10 mg) by mouth early in the morning.., Starting Tue 9/10/2024, Normal       !! - Potential duplicate medications found. Please discuss with provider.          ALLERGIES     Oxycodone, Morphine, Penicillins, and Prednisone    FAMILY HISTORY     No family history on file.       SOCIAL HISTORY       Social History     Socioeconomic History    Marital status:    Tobacco Use    Smoking status: Former     Types: Cigarettes    Smokeless tobacco: Never   Vaping Use    Vaping status: Never Used   Substance and Sexual Activity    Alcohol use: Not Currently    Drug use: Never    Sexual activity: Defer       PHYSICAL EXAM    (up to 7 for level 4, 8 or more for level 5)     ED Triage Vitals [09/13/24 1146]   Temperature Heart Rate Respirations BP   37 °C (98.6 °F) 60 18 155/81      Pulse Ox Temp src Heart Rate Source Patient Position   95 % -- -- --      BP Location FiO2 (%)     -- --       Physical Exam  Vitals and nursing note reviewed.   Constitutional:       General: He is not in acute distress.     Appearance: Normal appearance.   HENT:      Head: Normocephalic and atraumatic.   Eyes:      General: No scleral icterus.     Extraocular Movements: Extraocular movements intact.      Conjunctiva/sclera: Conjunctivae normal.   Cardiovascular:      Rate and Rhythm: Normal rate and regular rhythm.       Pulses: Normal pulses.      Heart sounds: Murmur heard.      Systolic murmur is present with a grade of 3/6.   Pulmonary:      Effort: Pulmonary effort is normal. No respiratory distress.      Breath sounds: Normal breath sounds. No wheezing, rhonchi or rales.   Abdominal:      General: There is no distension.      Palpations: Abdomen is soft.      Tenderness: There is no abdominal tenderness. There is no right CVA tenderness or left CVA tenderness.   Musculoskeletal:         General: No tenderness.      Cervical back: Neck supple. No bony tenderness.      Thoracic back: No bony tenderness.      Lumbar back: No tenderness or bony tenderness. Negative right straight leg raise test and negative left straight leg raise test.      Right lower le+ Pitting Edema present.      Left lower le+ Pitting Edema present.   Skin:     General: Skin is warm and dry.   Neurological:      Mental Status: He is alert and oriented to person, place, and time.   Psychiatric:         Mood and Affect: Mood normal.         Behavior: Behavior normal.        DIAGNOSTIC RESULTS     LABS:  Labs Reviewed   URINALYSIS WITH REFLEX CULTURE AND MICROSCOPIC - Abnormal       Result Value    Color, Urine Yellow      Appearance, Urine Clear      Specific Gravity, Urine 1.015      pH, Urine 7.0      Protein, Urine NEGATIVE      Glucose, Urine Normal      Blood, Urine 0.1 (1+) (*)     Ketones, Urine NEGATIVE      Bilirubin, Urine NEGATIVE      Urobilinogen, Urine 2 (1+) (*)     Nitrite, Urine NEGATIVE      Leukocyte Esterase, Urine NEGATIVE     CBC WITH AUTO DIFFERENTIAL - Abnormal    WBC 9.2      nRBC 0.0      RBC 3.99 (*)     Hemoglobin 11.8 (*)     Hematocrit 35.2 (*)     MCV 88      MCH 29.6      MCHC 33.5      RDW 14.5      Platelets 262      Neutrophils % 59.4      Immature Granulocytes %, Automated 0.2      Lymphocytes % 19.0      Monocytes % 11.8      Eosinophils % 8.0      Basophils % 1.6      Neutrophils Absolute 5.46      Immature  Granulocytes Absolute, Automated 0.02      Lymphocytes Absolute 1.75      Monocytes Absolute 1.09 (*)     Eosinophils Absolute 0.74 (*)     Basophils Absolute 0.15 (*)    COMPREHENSIVE METABOLIC PANEL - Abnormal    Glucose 93      Sodium 139      Potassium 3.7      Chloride 106      Bicarbonate 22      Anion Gap 15      Urea Nitrogen 25 (*)     Creatinine 0.90      eGFR 82      Calcium 9.0      Albumin 3.9      Alkaline Phosphatase 77      Total Protein 7.1      AST 17      Bilirubin, Total 1.2      ALT 14     URINALYSIS MICROSCOPIC WITH REFLEX CULTURE - Abnormal    WBC, Urine 1-5      RBC, Urine >20 (*)     Mucus, Urine FEW     URINALYSIS WITH REFLEX CULTURE AND MICROSCOPIC    Narrative:     The following orders were created for panel order Urinalysis with Reflex Culture and Microscopic.  Procedure                               Abnormality         Status                     ---------                               -----------         ------                     Urinalysis with Reflex C...[[id number]]  Abnormal            Final result               Extra Urine Gray Tube[[id number]]                            In process                   Please view results for these tests on the individual orders.   EXTRA URINE GRAY TUBE       All other labs were within normal range or not returned as of this dictation.    Imaging  CT abdomen pelvis w IV contrast   Final Result   Nonobstructing bilateral renal stones measuring 0.9 cm on the left and   0.8 cm on the right.   Enlarged prostate.   Constipation without bowel obstruction.   Levoscoliosis lumbar spine 30 degrees with multilevel lumbar   degenerative disc space narrowing and osteophytes.  No acute lumbar   fracture.   Signed by Aiden Bess MD           Procedures  Procedures     EMERGENCY DEPARTMENT COURSE/MDM:     Diagnoses as of 09/13/24 1756   Acute low back pain without sciatica, unspecified back pain laterality   Nephrolithiasis        Medical Decision Making  This is a  89-year-old male with past medical history of prostate cancer currently on leuprolide and nephrolithiasis, who presented with 36 hours of low back pain radiating to right lower quadrant.  On my exam, he does not have any L-spine bony and paraspinal tenderness.  He has negative straight leg raise test.  His abdominal exam is benign.  Based upon clinical presentation, this may be secondary to muscle spasms.  He does not have any red flags of cord compression--no difficulty ambulating, no numbness/weakness in bilateral lower extremities, no saddle anesthesia, no new urinary symptoms (he is not retaining urine), no loss of bowel control.  Given that the back pain radiates to the right lower quadrant and patient reports his symptoms reminded him of his prior kidney stones, we ordered CT abdomen/pelvis with IV contrast to rule this out. Patient's pain significantly improved following Toradol 15 mg IV.    CBC showed no leukocytosis and baseline anemia. CMP was unremarkable. Urinalysis was negative.  CT abdomen/pelvis with IV contrast demonstrated a nonobstructing bilateral renal stones and no evidence of acute lumbar fracture or osseous lesion.  His back pain is likely a referred pain from the nonobstructing kidney stones +/- muscle spasms.    Daughter discussed with me regarding her concerns about the patient.  She says that the patient has been falling at home because of his generalized weakness and he lives alone at home.  She is concerned that if he were to be discharged to home, he may fall.  She is also concerned that he is unable to care for himself at home.  I spoke with the patient and the daughter and offered inpatient admission to see  and PT/OT to help him regain his strength.  Patient declined; he understands the risks of going home without seeing PT/OT and benefits of PT/OT.  He has capacity to make decisions for himself.  Since the patient opted to be discharged to home, I strongly advised him  to see his PCP as soon as possible to address this generalized weakness and unsteady gait increasing his risk for falls.  Daughter also understands and is agreeable with the plan of discharge.  She says that in the meantime, while waiting to see patient's PCP, she would take care of his dad at home and help him with his daily activities.     On discharge, I instructed patient to take tamsulosin 0.4 mg daily for 2 weeks and take Zofran 4 mg every 8 hours as needed for nausea and vomiting.  I advised him to follow-up with his urology as soon as possible and return to the ER for any worsening/concerning symptoms.    Amount and/or Complexity of Data Reviewed  Labs: ordered.  Radiology: ordered.      Patient and or family in agreement and understanding of treatment plan.  All questions answered.      I reviewed the case with the attending ED physician. The attending ED physician agrees with the plan. Patient and/or patient´s representative was counseled regarding labs, imaging, likely diagnosis, and plan. All questions were answered.    ED Medications administered this visit:    Medications   ketorolac (Toradol) injection 15 mg (15 mg intravenous Given 9/13/24 1420)   iohexol (OMNIPaque) 350 mg iodine/mL solution 75 mL (75 mL intravenous Given 9/13/24 1544)       New Prescriptions from this visit:    Discharge Medication List as of 9/13/2024  5:16 PM        START taking these medications    Details   ondansetron ODT (Zofran-ODT) 4 mg disintegrating tablet Take 1 tablet (4 mg) by mouth every 8 hours if needed for nausea or vomiting for up to 7 days., Starting Fri 9/13/2024, Until Fri 9/20/2024 at 2359, Normal      tamsulosin (Flomax) 0.4 mg 24 hr capsule Take 1 capsule (0.4 mg) by mouth once daily for 14 days., Starting Fri 9/13/2024, Until Fri 9/27/2024, Normal             Follow-up:  Wilfrid Frank MD  [id number] [address] [phone]    Schedule an appointment as soon as possible for a visit in 1  day      Hari Pineda MD  [id number]  [place] [id number] [phone]    Schedule an appointment as soon as possible for a visit in 1 day          Final Impression:   1. Acute low back pain without sciatica, unspecified back pain laterality    2. Nephrolithiasis          (Please note that portions of this note were completed with a voice recognition program.  Efforts were made to edit the dictations but occasionally words are mis-transcribed.)     Brenda Malhotra, DO  Resident  09/13/24 2881

## 2024-09-15 ENCOUNTER — APPOINTMENT (OUTPATIENT)
Dept: CARDIOLOGY | Facility: HOSPITAL | Age: 89
End: 2024-09-15
Payer: MEDICARE

## 2024-09-15 ENCOUNTER — HOSPITAL ENCOUNTER (OUTPATIENT)
Facility: HOSPITAL | Age: 89
Setting detail: OBSERVATION
Discharge: NURSING, REHAB OR LONG-TERM CARE | End: 2024-09-18
Attending: STUDENT IN AN ORGANIZED HEALTH CARE EDUCATION/TRAINING PROGRAM | Admitting: STUDENT IN AN ORGANIZED HEALTH CARE EDUCATION/TRAINING PROGRAM
Payer: MEDICARE

## 2024-09-15 ENCOUNTER — APPOINTMENT (OUTPATIENT)
Dept: RADIOLOGY | Facility: HOSPITAL | Age: 89
End: 2024-09-15
Payer: MEDICARE

## 2024-09-15 DIAGNOSIS — R60.0 LOWER EXTREMITY EDEMA: ICD-10-CM

## 2024-09-15 DIAGNOSIS — R41.89 COGNITIVE AND BEHAVIORAL CHANGES: ICD-10-CM

## 2024-09-15 DIAGNOSIS — M79.89 OTHER SPECIFIED SOFT TISSUE DISORDERS: ICD-10-CM

## 2024-09-15 DIAGNOSIS — I10 ESSENTIAL HYPERTENSION: ICD-10-CM

## 2024-09-15 DIAGNOSIS — R01.1 MURMUR, CARDIAC: ICD-10-CM

## 2024-09-15 DIAGNOSIS — K59.00 CONSTIPATION, UNSPECIFIED CONSTIPATION TYPE: ICD-10-CM

## 2024-09-15 DIAGNOSIS — M54.50 ACUTE BILATERAL LOW BACK PAIN WITHOUT SCIATICA: Primary | ICD-10-CM

## 2024-09-15 DIAGNOSIS — R46.89 COGNITIVE AND BEHAVIORAL CHANGES: ICD-10-CM

## 2024-09-15 PROBLEM — M54.9 BACK PAIN DUE TO INJURY: Status: ACTIVE | Noted: 2024-09-15

## 2024-09-15 LAB
ALBUMIN SERPL BCP-MCNC: 3.9 G/DL (ref 3.4–5)
ALP SERPL-CCNC: 79 U/L (ref 33–136)
ALT SERPL W P-5'-P-CCNC: 13 U/L (ref 10–52)
ANION GAP SERPL CALC-SCNC: 12 MMOL/L (ref 10–20)
APPEARANCE UR: CLEAR
AST SERPL W P-5'-P-CCNC: 14 U/L (ref 9–39)
BASOPHILS # BLD AUTO: 0.13 X10*3/UL (ref 0–0.1)
BASOPHILS NFR BLD AUTO: 1.6 %
BILIRUB SERPL-MCNC: 1.2 MG/DL (ref 0–1.2)
BILIRUB UR STRIP.AUTO-MCNC: NEGATIVE MG/DL
BUN SERPL-MCNC: 27 MG/DL (ref 6–23)
CALCIUM SERPL-MCNC: 9 MG/DL (ref 8.6–10.3)
CHLORIDE SERPL-SCNC: 106 MMOL/L (ref 98–107)
CO2 SERPL-SCNC: 26 MMOL/L (ref 21–32)
COLOR UR: YELLOW
CREAT SERPL-MCNC: 0.87 MG/DL (ref 0.5–1.3)
EGFRCR SERPLBLD CKD-EPI 2021: 83 ML/MIN/1.73M*2
EOSINOPHIL # BLD AUTO: 0.61 X10*3/UL (ref 0–0.4)
EOSINOPHIL NFR BLD AUTO: 7.7 %
ERYTHROCYTE [DISTWIDTH] IN BLOOD BY AUTOMATED COUNT: 14.6 % (ref 11.5–14.5)
FERRITIN SERPL-MCNC: 23 NG/ML (ref 20–300)
GLUCOSE SERPL-MCNC: 102 MG/DL (ref 74–99)
GLUCOSE UR STRIP.AUTO-MCNC: NORMAL MG/DL
HCT VFR BLD AUTO: 36.3 % (ref 41–52)
HGB BLD-MCNC: 11.7 G/DL (ref 13.5–17.5)
HGB RETIC QN: 36 PG (ref 28–38)
IMM GRANULOCYTES # BLD AUTO: 0.02 X10*3/UL (ref 0–0.5)
IMM GRANULOCYTES NFR BLD AUTO: 0.3 % (ref 0–0.9)
IMMATURE RETIC FRACTION: 9.6 %
IRON SATN MFR SERPL: 14 % (ref 25–45)
IRON SERPL-MCNC: 53 UG/DL (ref 35–150)
KETONES UR STRIP.AUTO-MCNC: NEGATIVE MG/DL
LEUKOCYTE ESTERASE UR QL STRIP.AUTO: NEGATIVE
LYMPHOCYTES # BLD AUTO: 1.78 X10*3/UL (ref 0.8–3)
LYMPHOCYTES NFR BLD AUTO: 22.4 %
MCH RBC QN AUTO: 29.3 PG (ref 26–34)
MCHC RBC AUTO-ENTMCNC: 32.2 G/DL (ref 32–36)
MCV RBC AUTO: 91 FL (ref 80–100)
MONOCYTES # BLD AUTO: 0.87 X10*3/UL (ref 0.05–0.8)
MONOCYTES NFR BLD AUTO: 10.9 %
MUCOUS THREADS #/AREA URNS AUTO: ABNORMAL /LPF
NEUTROPHILS # BLD AUTO: 4.54 X10*3/UL (ref 1.6–5.5)
NEUTROPHILS NFR BLD AUTO: 57.1 %
NITRITE UR QL STRIP.AUTO: NEGATIVE
NRBC BLD-RTO: 0 /100 WBCS (ref 0–0)
PH UR STRIP.AUTO: 7 [PH]
PLATELET # BLD AUTO: 239 X10*3/UL (ref 150–450)
POTASSIUM SERPL-SCNC: 3.7 MMOL/L (ref 3.5–5.3)
PROT SERPL-MCNC: 7 G/DL (ref 6.4–8.2)
PROT UR STRIP.AUTO-MCNC: NEGATIVE MG/DL
RBC # BLD AUTO: 3.99 X10*6/UL (ref 4.5–5.9)
RBC # UR STRIP.AUTO: ABNORMAL /UL
RBC #/AREA URNS AUTO: >20 /HPF
RETICS #: 0.04 X10*6/UL (ref 0.02–0.11)
RETICS/RBC NFR AUTO: 1.1 % (ref 0.5–2)
SODIUM SERPL-SCNC: 140 MMOL/L (ref 136–145)
SP GR UR STRIP.AUTO: 1.02
SQUAMOUS #/AREA URNS AUTO: ABNORMAL /HPF
TIBC SERPL-MCNC: 390 UG/DL (ref 240–445)
UIBC SERPL-MCNC: 337 UG/DL (ref 110–370)
UROBILINOGEN UR STRIP.AUTO-MCNC: ABNORMAL MG/DL
WBC # BLD AUTO: 8 X10*3/UL (ref 4.4–11.3)
WBC #/AREA URNS AUTO: ABNORMAL /HPF

## 2024-09-15 PROCEDURE — 93005 ELECTROCARDIOGRAM TRACING: CPT

## 2024-09-15 PROCEDURE — 36415 COLL VENOUS BLD VENIPUNCTURE: CPT | Performed by: STUDENT IN AN ORGANIZED HEALTH CARE EDUCATION/TRAINING PROGRAM

## 2024-09-15 PROCEDURE — 99285 EMERGENCY DEPT VISIT HI MDM: CPT | Mod: 25

## 2024-09-15 PROCEDURE — 85025 COMPLETE CBC W/AUTO DIFF WBC: CPT | Performed by: STUDENT IN AN ORGANIZED HEALTH CARE EDUCATION/TRAINING PROGRAM

## 2024-09-15 PROCEDURE — 96372 THER/PROPH/DIAG INJ SC/IM: CPT

## 2024-09-15 PROCEDURE — 83540 ASSAY OF IRON: CPT

## 2024-09-15 PROCEDURE — 2500000005 HC RX 250 GENERAL PHARMACY W/O HCPCS

## 2024-09-15 PROCEDURE — 71045 X-RAY EXAM CHEST 1 VIEW: CPT

## 2024-09-15 PROCEDURE — 81001 URINALYSIS AUTO W/SCOPE: CPT | Performed by: STUDENT IN AN ORGANIZED HEALTH CARE EDUCATION/TRAINING PROGRAM

## 2024-09-15 PROCEDURE — 36415 COLL VENOUS BLD VENIPUNCTURE: CPT

## 2024-09-15 PROCEDURE — G0378 HOSPITAL OBSERVATION PER HR: HCPCS

## 2024-09-15 PROCEDURE — 2500000002 HC RX 250 W HCPCS SELF ADMINISTERED DRUGS (ALT 637 FOR MEDICARE OP, ALT 636 FOR OP/ED)

## 2024-09-15 PROCEDURE — 2500000004 HC RX 250 GENERAL PHARMACY W/ HCPCS (ALT 636 FOR OP/ED)

## 2024-09-15 PROCEDURE — 96376 TX/PRO/DX INJ SAME DRUG ADON: CPT

## 2024-09-15 PROCEDURE — 71045 X-RAY EXAM CHEST 1 VIEW: CPT | Performed by: RADIOLOGY

## 2024-09-15 PROCEDURE — 80053 COMPREHEN METABOLIC PANEL: CPT | Performed by: STUDENT IN AN ORGANIZED HEALTH CARE EDUCATION/TRAINING PROGRAM

## 2024-09-15 PROCEDURE — 2500000001 HC RX 250 WO HCPCS SELF ADMINISTERED DRUGS (ALT 637 FOR MEDICARE OP)

## 2024-09-15 PROCEDURE — 85045 AUTOMATED RETICULOCYTE COUNT: CPT | Performed by: STUDENT IN AN ORGANIZED HEALTH CARE EDUCATION/TRAINING PROGRAM

## 2024-09-15 PROCEDURE — 96374 THER/PROPH/DIAG INJ IV PUSH: CPT

## 2024-09-15 PROCEDURE — 82728 ASSAY OF FERRITIN: CPT

## 2024-09-15 PROCEDURE — 84153 ASSAY OF PSA TOTAL: CPT | Mod: STJLAB

## 2024-09-15 RX ORDER — TAMSULOSIN HYDROCHLORIDE 0.4 MG/1
0.4 CAPSULE ORAL DAILY
Status: DISCONTINUED | OUTPATIENT
Start: 2024-09-16 | End: 2024-09-18 | Stop reason: HOSPADM

## 2024-09-15 RX ORDER — KETOROLAC TROMETHAMINE 15 MG/ML
15 INJECTION, SOLUTION INTRAMUSCULAR; INTRAVENOUS ONCE
Status: DISCONTINUED | OUTPATIENT
Start: 2024-09-15 | End: 2024-09-15

## 2024-09-15 RX ORDER — ASPIRIN 81 MG/1
81 TABLET ORAL DAILY
Status: DISCONTINUED | OUTPATIENT
Start: 2024-09-15 | End: 2024-09-18 | Stop reason: HOSPADM

## 2024-09-15 RX ORDER — KETOROLAC TROMETHAMINE 15 MG/ML
15 INJECTION, SOLUTION INTRAMUSCULAR; INTRAVENOUS ONCE
Status: COMPLETED | OUTPATIENT
Start: 2024-09-15 | End: 2024-09-15

## 2024-09-15 RX ORDER — AMOXICILLIN 250 MG
1 CAPSULE ORAL NIGHTLY
Status: DISCONTINUED | OUTPATIENT
Start: 2024-09-15 | End: 2024-09-16

## 2024-09-15 RX ORDER — PRIMIDONE 50 MG/1
50 TABLET ORAL NIGHTLY
Status: DISCONTINUED | OUTPATIENT
Start: 2024-09-15 | End: 2024-09-18 | Stop reason: HOSPADM

## 2024-09-15 RX ORDER — AMLODIPINE BESYLATE 10 MG/1
10 TABLET ORAL DAILY
Status: DISCONTINUED | OUTPATIENT
Start: 2024-09-15 | End: 2024-09-18

## 2024-09-15 RX ORDER — ENOXAPARIN SODIUM 100 MG/ML
40 INJECTION SUBCUTANEOUS EVERY 24 HOURS
Status: DISCONTINUED | OUTPATIENT
Start: 2024-09-15 | End: 2024-09-18 | Stop reason: HOSPADM

## 2024-09-15 RX ORDER — KETOROLAC TROMETHAMINE 15 MG/ML
15 INJECTION, SOLUTION INTRAMUSCULAR; INTRAVENOUS EVERY 6 HOURS PRN
Status: DISCONTINUED | OUTPATIENT
Start: 2024-09-15 | End: 2024-09-18 | Stop reason: HOSPADM

## 2024-09-15 RX ORDER — ACETAMINOPHEN 325 MG/1
975 TABLET ORAL EVERY 6 HOURS PRN
Status: DISCONTINUED | OUTPATIENT
Start: 2024-09-15 | End: 2024-09-18 | Stop reason: HOSPADM

## 2024-09-15 RX ORDER — POLYETHYLENE GLYCOL 3350 17 G/17G
17 POWDER, FOR SOLUTION ORAL DAILY
Status: DISCONTINUED | OUTPATIENT
Start: 2024-09-15 | End: 2024-09-16

## 2024-09-15 RX ORDER — LIDOCAINE 560 MG/1
1 PATCH PERCUTANEOUS; TOPICAL; TRANSDERMAL DAILY
Status: DISCONTINUED | OUTPATIENT
Start: 2024-09-15 | End: 2024-09-18 | Stop reason: HOSPADM

## 2024-09-15 SDOH — SOCIAL STABILITY: SOCIAL INSECURITY: DO YOU FEEL ANYONE HAS EXPLOITED OR TAKEN ADVANTAGE OF YOU FINANCIALLY OR OF YOUR PERSONAL PROPERTY?: NO

## 2024-09-15 SDOH — SOCIAL STABILITY: SOCIAL INSECURITY: HAVE YOU HAD THOUGHTS OF HARMING ANYONE ELSE?: NO

## 2024-09-15 SDOH — SOCIAL STABILITY: SOCIAL INSECURITY: WERE YOU ABLE TO COMPLETE ALL THE BEHAVIORAL HEALTH SCREENINGS?: YES

## 2024-09-15 SDOH — SOCIAL STABILITY: SOCIAL INSECURITY: HAS ANYONE EVER THREATENED TO HURT YOUR FAMILY OR YOUR PETS?: NO

## 2024-09-15 SDOH — SOCIAL STABILITY: SOCIAL INSECURITY: ARE YOU OR HAVE YOU BEEN THREATENED OR ABUSED PHYSICALLY, EMOTIONALLY, OR SEXUALLY BY ANYONE?: NO

## 2024-09-15 SDOH — SOCIAL STABILITY: SOCIAL INSECURITY: DO YOU FEEL UNSAFE GOING BACK TO THE PLACE WHERE YOU ARE LIVING?: NO

## 2024-09-15 SDOH — SOCIAL STABILITY: SOCIAL INSECURITY: ARE THERE ANY APPARENT SIGNS OF INJURIES/BEHAVIORS THAT COULD BE RELATED TO ABUSE/NEGLECT?: NO

## 2024-09-15 SDOH — SOCIAL STABILITY: SOCIAL INSECURITY: DOES ANYONE TRY TO KEEP YOU FROM HAVING/CONTACTING OTHER FRIENDS OR DOING THINGS OUTSIDE YOUR HOME?: NO

## 2024-09-15 SDOH — SOCIAL STABILITY: SOCIAL INSECURITY: ABUSE: ADULT

## 2024-09-15 ASSESSMENT — PAIN DESCRIPTION - FREQUENCY: FREQUENCY: CONSTANT/CONTINUOUS

## 2024-09-15 ASSESSMENT — ACTIVITIES OF DAILY LIVING (ADL)
FEEDING YOURSELF: NEEDS ASSISTANCE
JUDGMENT_ADEQUATE_SAFELY_COMPLETE_DAILY_ACTIVITIES: YES
GROOMING: NEEDS ASSISTANCE
WALKS IN HOME: NEEDS ASSISTANCE
DRESSING YOURSELF: NEEDS ASSISTANCE
PATIENT'S MEMORY ADEQUATE TO SAFELY COMPLETE DAILY ACTIVITIES?: YES
LACK_OF_TRANSPORTATION: PATIENT DECLINED
BATHING: NEEDS ASSISTANCE
HEARING - LEFT EAR: FUNCTIONAL
TOILETING: NEEDS ASSISTANCE
ADEQUATE_TO_COMPLETE_ADL: YES
HEARING - RIGHT EAR: FUNCTIONAL

## 2024-09-15 ASSESSMENT — LIFESTYLE VARIABLES
HOW OFTEN DO YOU HAVE 6 OR MORE DRINKS ON ONE OCCASION: NEVER
SKIP TO QUESTIONS 9-10: 1
AUDIT-C TOTAL SCORE: 0
HOW MANY STANDARD DRINKS CONTAINING ALCOHOL DO YOU HAVE ON A TYPICAL DAY: PATIENT DOES NOT DRINK
HOW OFTEN DO YOU HAVE A DRINK CONTAINING ALCOHOL: NEVER
EVER HAD A DRINK FIRST THING IN THE MORNING TO STEADY YOUR NERVES TO GET RID OF A HANGOVER: NO
HAVE YOU EVER FELT YOU SHOULD CUT DOWN ON YOUR DRINKING: NO
AUDIT-C TOTAL SCORE: 0
TOTAL SCORE: 0
EVER FELT BAD OR GUILTY ABOUT YOUR DRINKING: NO
HAVE PEOPLE ANNOYED YOU BY CRITICIZING YOUR DRINKING: NO

## 2024-09-15 ASSESSMENT — COGNITIVE AND FUNCTIONAL STATUS - GENERAL
TOILETING: A LOT
CLIMB 3 TO 5 STEPS WITH RAILING: A LOT
TURNING FROM BACK TO SIDE WHILE IN FLAT BAD: A LOT
EATING MEALS: A LOT
MOVING TO AND FROM BED TO CHAIR: A LOT
DAILY ACTIVITIY SCORE: 12
HELP NEEDED FOR BATHING: A LOT
PERSONAL GROOMING: A LOT
WALKING IN HOSPITAL ROOM: A LOT
DRESSING REGULAR LOWER BODY CLOTHING: A LOT
MOBILITY SCORE: 12
PATIENT BASELINE BEDBOUND: NO
DRESSING REGULAR UPPER BODY CLOTHING: A LOT
STANDING UP FROM CHAIR USING ARMS: A LOT
MOVING FROM LYING ON BACK TO SITTING ON SIDE OF FLAT BED WITH BEDRAILS: A LOT

## 2024-09-15 ASSESSMENT — PAIN DESCRIPTION - LOCATION
LOCATION: BACK
LOCATION: BACK

## 2024-09-15 ASSESSMENT — PAIN SCALES - GENERAL
PAINLEVEL_OUTOF10: 4
PAINLEVEL_OUTOF10: 5 - MODERATE PAIN

## 2024-09-15 ASSESSMENT — PAIN SCALES - WONG BAKER: WONGBAKER_NUMERICALRESPONSE: HURTS LITTLE MORE

## 2024-09-15 ASSESSMENT — COLUMBIA-SUICIDE SEVERITY RATING SCALE - C-SSRS
1. IN THE PAST MONTH, HAVE YOU WISHED YOU WERE DEAD OR WISHED YOU COULD GO TO SLEEP AND NOT WAKE UP?: NO
2. HAVE YOU ACTUALLY HAD ANY THOUGHTS OF KILLING YOURSELF?: NO
6. HAVE YOU EVER DONE ANYTHING, STARTED TO DO ANYTHING, OR PREPARED TO DO ANYTHING TO END YOUR LIFE?: NO

## 2024-09-15 ASSESSMENT — PAIN DESCRIPTION - DESCRIPTORS: DESCRIPTORS: ACHING;SHARP

## 2024-09-15 ASSESSMENT — PAIN DESCRIPTION - PAIN TYPE: TYPE: ACUTE PAIN

## 2024-09-15 ASSESSMENT — PAIN DESCRIPTION - ORIENTATION: ORIENTATION: RIGHT

## 2024-09-15 ASSESSMENT — PAIN - FUNCTIONAL ASSESSMENT: PAIN_FUNCTIONAL_ASSESSMENT: 0-10

## 2024-09-15 ASSESSMENT — PAIN DESCRIPTION - ONSET: ONSET: ONGOING

## 2024-09-15 ASSESSMENT — PAIN DESCRIPTION - PROGRESSION: CLINICAL_PROGRESSION: GRADUALLY WORSENING

## 2024-09-15 NOTE — ED PROVIDER NOTES
EMERGENCY DEPARTMENT ENCOUNTER      Pt Name: Benjamin King  MRN: [id number]  Birthdate 1935  Date of evaluation: 9/15/2024  Provider: Michael Lennon MD    CHIEF COMPLAINT       Chief Complaint   Patient presents with    Flank Pain     Diagnosed with kidney stone yesterday, right flank pain. Patient endorses that pain is worsening and that he could not sleep last night due to it.      HISTORY OF PRESENT ILLNESS    Benjamin King is a 89 y.o. year old male who presents to the ER for  persistent back pain.  He reports lower lumbar pain on the right side.  He reports that it radiates to his hip.  The patient reports that since his ED visit 2 days ago the pain is worsened, he cannot lay down.  He reports that his ability to walk is impaired by pain.  Patient denies saddle anesthesia, involuntary loss of bowel control. He complains of generalized weakness.  The patient's past medical history is significant for active prostate cancer, hypertension, CAD, A-fib, chronic urinary incontinence, and hyperlipidemia.      PAST MEDICAL HISTORY     Past Medical History:   Diagnosis Date    Body mass index (BMI) 29.0-29.9, adult 06/08/2021    BMI 29.0-29.9,adult    Chills (without fever) 02/15/2022    Chills    Encounter for general adult medical examination without abnormal findings 02/15/2022    Medicare annual wellness visit, subsequent    Encounter for general adult medical examination without abnormal findings 06/23/2020    Medicare annual wellness visit, subsequent    Encounter for immunization     Encounter for immunization    Encounter for screening for malignant neoplasm of prostate     Encounter for prostate cancer screening    Other conditions influencing health status 08/17/2021    History of cough    Other specified symptoms and signs involving the circulatory and respiratory systems     Chest congestion    Personal history of malignant neoplasm of prostate     History of malignant neoplasm of prostate    Personal  history of other diseases of the musculoskeletal system and connective tissue 04/13/2021    History of tendinitis    Personal history of other diseases of the nervous system and sense organs 12/17/2019    History of acute conjunctivitis    Personal history of other diseases of the respiratory system 06/13/2021    History of upper respiratory infection    Personal history of other diseases of the respiratory system 06/13/2022    History of bronchitis    Personal history of other drug therapy 12/17/2019    History of pneumococcal vaccination    Personal history of urinary (tract) infections 06/08/2021    History of urinary tract infection     CURRENT MEDICATIONS       Current Discharge Medication List        CONTINUE these medications which have NOT CHANGED    Details   amLODIPine (Norvasc) 5 mg tablet Take 2 tablets (10 mg) by mouth once daily.  Qty: 180 tablet, Refills: 1    Associated Diagnoses: HTN (hypertension), benign      aspirin 81 mg EC tablet Take 1 tablet (81 mg) by mouth once daily.      hydrocortisone (Anusol-HC) 2.5 % rectal cream Insert into the rectum 2 times a day as needed for hemorrhoids (rectal discomfort). Apply to affected areas  Qty: 30 g, Refills: 2    Associated Diagnoses: CA prostate, adenoca (Multi)      !! leuprolide, 6 month, 45 mg injection Inject 45 mg under the skin every 6 months.  Qty: 1 each, Refills: 1    Comments: Need for October 2024.  Please deliver to the office:  [address] [place] [address], [address] [place] [address].  Associated Diagnoses: Prostate cancer (Multi)      meclizine (Antivert) 25 mg tablet TAKE 1 TABLET BY MOUTH 3 TIMES  DAILY AS NEEDED  Qty: 270 tablet, Refills: 0    Associated Diagnoses: Vertigo      mirabegron (Myrbetriq) 50 mg tablet extended release 24 hr 24 hr tablet Take 1 tablet (50 mg) by mouth once daily.  Qty: 30 tablet, Refills: 3    Associated Diagnoses: Urinary incontinence, unspecified type      primidone (Mysoline) 50 mg tablet Take 1 tablet  (50 mg) by mouth once daily at bedtime.  Qty: 30 tablet, Refills: 2    Associated Diagnoses: Tremor      solifenacin (VESIcare) 10 mg tablet Take 1 tablet (10 mg) by mouth early in the morning..  Qty: 90 tablet, Refills: 0    Associated Diagnoses: Urinary incontinence, unspecified type      tamsulosin (Flomax) 0.4 mg 24 hr capsule Take 1 capsule (0.4 mg) by mouth once daily for 14 days.  Qty: 14 capsule, Refills: 0    Associated Diagnoses: Nephrolithiasis      diclofenac (Voltaren) 75 mg EC tablet Take 1 tablet (75 mg) by mouth 2 times a day. Do not crush, chew, or split.  Qty: 60 tablet, Refills: 3    Comments: Please send a replace/new response with 100-Day Supply if appropriate to maximize member benefit. Requesting 1 year supply.  Associated Diagnoses: Arthritis      !! leuprolide, 6 month, 45 mg injection Inject 45 mg under the skin every 6 months. Do not fill before October 1, 2024.  Qty: 1 each, Refills: 1    Comments: Due for next injection in October 2025.  Please deliver to the office:  [address] [place] [address], [address] ([address])  [place] [address]  Associated Diagnoses: Prostate cancer (Multi)      ondansetron ODT (Zofran-ODT) 4 mg disintegrating tablet Take 1 tablet (4 mg) by mouth every 8 hours if needed for nausea or vomiting for up to 7 days.  Qty: 15 tablet, Refills: 0    Associated Diagnoses: Nephrolithiasis       !! - Potential duplicate medications found. Please discuss with provider.        SURGICAL HISTORY       Past Surgical History:   Procedure Laterality Date    OTHER SURGICAL HISTORY  07/16/2019    Knee replacement    OTHER SURGICAL HISTORY  07/16/2019    Abdominal surgery    OTHER SURGICAL HISTORY  07/16/2019    Tonsillectomy     ALLERGIES     Oxycodone, Penicillins, Morphine, and Prednisone  FAMILY HISTORY     No family history on file.  SOCIAL HISTORY       Social History     Tobacco Use    Smoking status: Former     Types: Cigarettes    Smokeless tobacco: Never   Vaping Use     Vaping status: Never Used   Substance Use Topics    Alcohol use: Not Currently    Drug use: Never     PHYSICAL EXAM  (up to 7 for level 4, 8 or more for level 5)     ED Triage Vitals [09/15/24 1215]   Temperature Heart Rate Respirations BP   36.5 °C (97.7 °F) 65 18 170/85      Pulse Ox Temp Source Heart Rate Source Patient Position   96 % Temporal Monitor Sitting      BP Location FiO2 (%)     Right arm --       Physical Exam  Constitutional:       Appearance: He is obese.   HENT:      Mouth/Throat:      Mouth: Mucous membranes are moist.   Cardiovascular:      Rate and Rhythm: Normal rate.      Heart sounds: Murmur heard.   Pulmonary:      Effort: Pulmonary effort is normal.      Breath sounds: Normal breath sounds.   Abdominal:      General: Abdomen is flat.      Palpations: Abdomen is soft.   Musculoskeletal:         General: Normal range of motion.      Comments: Straight leg test was negative.   Neurological:      General: No focal deficit present.      Mental Status: He is alert.      Cranial Nerves: No cranial nerve deficit.      Sensory: No sensory deficit.      Motor: No weakness.      Coordination: Coordination normal.        DIAGNOSTIC RESULTS   LABS:  Labs Reviewed   CBC WITH AUTO DIFFERENTIAL - Abnormal       Result Value    WBC 8.0      nRBC 0.0      RBC 3.99 (*)     Hemoglobin 11.7 (*)     Hematocrit 36.3 (*)     MCV 91      MCH 29.3      MCHC 32.2      RDW 14.6 (*)     Platelets 239      Neutrophils % 57.1      Immature Granulocytes %, Automated 0.3      Lymphocytes % 22.4      Monocytes % 10.9      Eosinophils % 7.7      Basophils % 1.6      Neutrophils Absolute 4.54      Immature Granulocytes Absolute, Automated 0.02      Lymphocytes Absolute 1.78      Monocytes Absolute 0.87 (*)     Eosinophils Absolute 0.61 (*)     Basophils Absolute 0.13 (*)    COMPREHENSIVE METABOLIC PANEL - Abnormal    Glucose 102 (*)     Sodium 140      Potassium 3.7      Chloride 106      Bicarbonate 26      Anion Gap 12       "Urea Nitrogen 27 (*)     Creatinine 0.87      eGFR 83      Calcium 9.0      Albumin 3.9      Alkaline Phosphatase 79      Total Protein 7.0      AST 14      Bilirubin, Total 1.2      ALT 13     PROSTATE SPECIFIC ANTIGEN   IRON AND TIBC   FERRITIN     All other labs were within normal range or not returned as of this dictation.  Imaging  No orders to display      Procedure  Procedures  EMERGENCY DEPARTMENT COURSE/MDM:   Medical Decision Making  Patient is a 89-year-old male who comes to the ED for persistent back pain.  He reports lower lumbar pain on the right side.  He reports that it radiates to his hip.  The patient reports that since his ED visit 2 days ago the pain is worsened, he cannot lay down.  He reports that his ability to walk is impaired by pain.  Patient denies saddle anesthesia, involuntary loss of bowel control. He complains of generalized weakness.  The patient's past medical history is significant for active prostate cancer, hypertension, CAD, A-fib, chronic urinary incontinence, and hyperlipidemia.     On physical exam the straight leg test was negative.  No loss of sensation in the legs and feet, range of normal motion is within normal limits.  Hip flexion was 2+, knee extension was 2+, ankle inversion was 2+, ankle eversion was 2+.  Heel to shin test was normal.     Routine labs were within normal limits.  The patient is admitted for worsening pain and concern for his active prostate cancer.  Recommendation is to consult neurosurgery urgently but not emergently and to possibly get an MRI performed.     Amount and/or Complexity of Data Reviewed  Independent Historian: caregiver      Vitals:    Vitals:    09/15/24 1215 09/15/24 1450   BP: 170/85 160/90   BP Location: Right arm Right arm   Patient Position: Sitting Lying   Pulse: 65 65   Resp: 18 18   Temp: 36.5 °C (97.7 °F)    TempSrc: Temporal    SpO2: 96% 96%   Weight: 80.7 kg (178 lb)    Height: 1.676 m (5' 6\")          ED Course as of 09/15/24 " 1612   [place]ctor Sep 15, 2024   [id number] 89-year-old male presenting with persistent flank pain.  Prior workup revealed intra renal kidney stones.  Unable to give narcotics due to multiple allergic reactions.  Will give Toradol again at this time.  I am also concerned about the fact of possible new metastatic disease to the spine or other spinal etiology causing his low back pain.  Known history of prostate cancer with no known metastatic disease.  He is neurologically intact with no new urinary or fecal symptoms.  He has persistent urinary incontinence that is chronic.  Good strength in lower extremities on neurologic examination no saddle anesthesia or sensory loss.  Does have right sided low back pain with no step-off or deformity or tenderness with percussion of the spine.  Has had recurrent falls with none in the last 2 days.  Patient and caregiver at bedside are concerned is not safe at home.  Will obtain basic repeat laboratory studies and plan for admission after giving Toradol.  Will discuss with the inpatient team regarding nonemergent need for consultation to spine team or potential urgent MRI to rule out the possibility of new metastatic disease.  Will also recommend PT, OT, case management consultation.  Patient made aware this is not available on Sunday afternoon at this time from the emergency department for placement.  Given the patient has no neurologic decompensation I do not believe the patient suffering from cauda equina or central cord syndrome requiring emergent MRI imaging. [TL]      ED Course User Index  [TL] Trent Crocker DO         Diagnoses as of 09/15/24 1612   Acute bilateral low back pain without sciatica       Consultant discussions: Internal medicine agreed to admit the patient.   Diagnostic testing considered but not performed: CT of the abdomen and pelvis was considered but deferred because it was performed 2 days ago.  External Records Reviewed: I reviewed recent and relevant outside  records including inpatient notes, outpatient records  Prescription Drug Consideration: Patient is currently on oncology treatment.    Shared decision making for disposition  Patient and/or patient´s representative was counseled regarding labs, imaging, likely diagnosis. All questions were answered. Recommendation was made   for Admission given the need for further escalation of care to inpatient management. Patient agreed and was admitted in stable condition. Admitting team was notified of any pending labs or imaging to ensure continuity of care.     ED Medications administered this visit:    Medications   enoxaparin (Lovenox) syringe 40 mg (has no administration in time range)   polyethylene glycol (Glycolax, Miralax) packet 17 g (has no administration in time range)   amLODIPine (Norvasc) tablet 10 mg (has no administration in time range)   aspirin EC tablet 81 mg (has no administration in time range)   primidone (Mysoline) tablet 50 mg (has no administration in time range)   tamsulosin (Flomax) 24 hr capsule 0.4 mg (has no administration in time range)   acetaminophen (Tylenol) tablet 975 mg (has no administration in time range)   ketorolac (Toradol) injection 15 mg (has no administration in time range)   lidocaine 4 % patch 1 patch (has no administration in time range)   ketorolac (Toradol) injection 15 mg (15 mg intravenous Given 9/15/24 1444)       New Prescriptions from this visit:    Current Discharge Medication List          Follow-up:  No follow-up provider specified.      Final Impression:   1. Acute bilateral low back pain without sciatica          Please excuse any misspellings or unintended errors related to the Dragon speech recognition software used to dictate this note.    I reviewed the case with the attending ED physician. The attending ED physician agrees with the plan.      Michael Lennon MD  Resident  09/15/24 9508

## 2024-09-15 NOTE — H&P
History Of Present Illness  Benjamin King is a 89 y.o. male with the past medical history prostate cancer (diagnosed in 2021) on leuprolide (last treated in October 2023) status post Eligard shot in April 2024, hypertension, OA, HLD, CVA in 2017, mild carotid artery disease (50% bilaterally in 2020) who presented to the [facility] emergency department due to right-sided back pain that has been persistent however worsening the past 5 days.  Pain radiates to the right hip.  Denies any red flag signs such as saddle anesthesia, bowel/bladder incontinence, lower extremity numbness/tingling sensation.  Patient also notes of some recent falls that were mechanical in nature due to feeling weak.  Of note, the patient was recently seen in the ED 2 days ago for similar pain in which CT findings revealed nonobstructing bilateral renal stones measuring 0.9 and 0.8 cm.  No evidence of hydronephrosis.  Enlarged prostate.  Constipation without bowel obstruction.  Patient's pain was treated and he was discharged home.  Patient states upon arrival home he was unable to get out of bed and perform his ADLs secondary to pain.  This morning patient attempted to go to [employer] however could not move due to significant pain therefore called his friend for help.  Patient lives at home by himself in [place] and states because of his pain he has not been able to do much around the home.     ED course:   Vital signs: Afebrile 97.7, heart rate 65, respiratory rate 18, blood pressure 170/85, SpO2 96% on room air  Labs: CBC with hemoglobin 11.7 (baseline), no leukocytosis or thrombocytopenia.  CMP with normal electrolytes, mild BUN elevation 27, normal creatinine, normal LFTs.    Intervention: Patient was given 15 mg Toradol IV and recommended for admission for possible placement.    PMHx: prostate cancer (diagnosed in 2021) on leuprolide (last treated in October 2023) status post Eligard shot in April 2024, hypertension,  OA, HLD, CVA in 2017, mild carotid artery disease (50% bilaterally in 2020)  PSHx: Left knee arthroplasty  Allergies: Oxycodone, penicillin, morphine, prednisone  CODE STATUS: Full code     Past Medical History  He has a past medical history of Body mass index (BMI) 29.0-29.9, adult (06/08/2021), Chills (without fever) (02/15/2022), Encounter for general adult medical examination without abnormal findings (02/15/2022), Encounter for general adult medical examination without abnormal findings (06/23/2020), Encounter for immunization, Encounter for screening for malignant neoplasm of prostate, Other conditions influencing health status (08/17/2021), Other specified symptoms and signs involving the circulatory and respiratory systems, Personal history of malignant neoplasm of prostate, Personal history of other diseases of the musculoskeletal system and connective tissue (04/13/2021), Personal history of other diseases of the nervous system and sense organs (12/17/2019), Personal history of other diseases of the respiratory system (06/13/2021), Personal history of other diseases of the respiratory system (06/13/2022), Personal history of other drug therapy (12/17/2019), and Personal history of urinary (tract) infections (06/08/2021).    Surgical History  He has a past surgical history that includes Other surgical history (07/16/2019); Other surgical history (07/16/2019); and Other surgical history (07/16/2019).     Social History  He reports that he has quit smoking. His smoking use included cigarettes. He has never used smokeless tobacco. He reports that he does not currently use alcohol. He reports that he does not use drugs.    Family History  No family history on file.     Allergies  Oxycodone, Penicillins, Morphine, and Prednisone    Review of Systems     -----------------------------------------------------------------  Review of Systems     Constitutional: Denies  Fever, Chills    Eyes: Denies Blurry Vision,  Vision Loss/ Change    ENMT: Denies Nasal Discharge, Nasal Congestion    Respiratory: Denies Dry Cough, Productive Cough, Wheezing, Shortness of Breath    Cardiac: Denies Chest Pain, Syncope, Palpitations    Gastrointestinal: Denies Nausea, Vomiting, Diarrhea, Constipation, Abdominal Pain    Genitourinary: Denies Discharge, Dysuria, Flank Pain    Musculoskeletal: Back pain    Neurological:  Denies Dizziness, Confusion, Headache, Syncope    Skin: Denies Pain, Rash, Ulcer    Endocrine: Denies Sweat, Polyuria    Hematologic/Lymph:  Denies Night Sweats, Petechiae    Allergic/Immunologic: Denies Anaphylaxis    Physical Exam     General: Awake, alert/oriented x4, well developed, no acute distress  Head: Atraumatic/Normocephalic  Eyes: Normal external exam, EOMI, PERRLA  Cardiovascular: Systolic murmur noted, no rubs, or gallops, radial pulses +2  Pulmonary: CTAB, no respiratory distress. No wheezes, rales, or ronchi  Abdomen: +BS, soft, non-tender, nondistended, no guarding or rebound, no masses noted  MSK: No joint swelling, normal movements of all extremities. Range of motion- normal.  Extremities: FROM, no edema, wounds, or contusions  Skin- No lesions, contusions, or erythema.  Neuro: Alert/oriented x4, no focal motor or sensory deficits    Last Recorded Vitals  /90 (BP Location: Right arm, Patient Position: Lying)   Pulse 65   Temp 36.5 °C (97.7 °F) (Temporal)   Resp 18   Wt 80.7 kg (178 lb)   SpO2 96%     Relevant Results        CT abdomen pelvis w IV contrast    Result Date: 9/13/2024  STUDY: CT Abdomen and Pelvis with IV Contrast; 9/13/24 at 3:48 PM INDICATION: Back pain.  History prostate CA. Renal stones. COMPARISON: None available. ACCESSION NUMBER(S): [id number] ORDERING CLINICIAN: ERIK JANSEN TECHNIQUE: CT of the abdomen and pelvis was performed.  Contiguous axial images were obtained at 3 mm slice thickness through the abdomen and pelvis. Coronal and sagittal reconstructions at 3 mm slice  thickness were performed.  Omnipaque 350 75 mL was administered intravenously.   FINDINGS: LOWER CHEST: Mild cardiomegaly  No pericardial effusion.  3 mm pulmonary nodule within the right middle lobe on image 9  ABDOMEN:  LIVER: No hepatomegaly.  Smooth surface contour.  Normal attenuation.  BILE DUCTS: No intrahepatic or extrahepatic biliary ductal dilatation.  GALLBLADDER: The gallbladder is negative. STOMACH: Moderate size hiatal hernia.  PANCREAS: Negative for pancreatitis  SPLEEN: No splenomegaly or focal splenic lesion.  ADRENAL GLANDS: No thickening or nodules.  KIDNEYS AND URETERS: Kidneys are normal in size and location.  Nonobstructing 0.9 cm stone lower pole left kidney.  Nonobstructing 0.8 cm stone lower pole right kidney.  PELVIS:  BLADDER: Posterior bladder wall trabeculations  REPRODUCTIVE ORGANS: Enlarged prostate 5.5 x 5.4 cm  BOWEL: Constipation without bowel obstruction.  VESSELS: The hepatic veins, portal veins, superior mesenteric vein and splenic vein are patent.  Negative for abdominal aortic aneurysm.  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  Calcifications within the proximal SMA and bilateral renal arteries.  PERITONEUM/RETROPERITONEUM/LYMPH NODES: No free fluid.  No pneumoperitoneum. No lymphadenopathy.  ABDOMINAL WALL: No abnormalities identified. SOFT TISSUES: No abnormalities identified.  BONES: No acute fracture or aggressive osseous lesion.  Multilevel lumbar degenerative disc disease with levoscoliosis measuring 30 degrees    Nonobstructing bilateral renal stones measuring 0.9 cm on the left and 0.8 cm on the right. Enlarged prostate. Constipation without bowel obstruction. Levoscoliosis lumbar spine 30 degrees with multilevel lumbar degenerative disc space narrowing and osteophytes.  No acute lumbar fracture. Signed by Aiden Bess MD       Assessment/Plan   Assessment & Plan      Benjamin King is an 89 year old male with a PMHx of prostate cancer (diagnosed in 2021) on  leuprolide (last treated in October 2023) status post Eligard shot in April 2024, hypertension, OA, HLD, CVA in 2017, mild carotid artery disease (50% bilaterally in 2020) who presented to the [facility] ED due to worsening right sided back pain as well as several recent falls due to pain and weakness.  Admitted for PT OT evaluation and potential placement.    # Right sided back pain  # Weakness/deconditioning possibly secondary to history of malignancy  # Prostate cancer on leuprolide/Eligard  -CT abdomen pelvis discussed above.  No evidence concerning for metastatic disease to the bone at this time.  -Will obtain PSA level (last PSA 7.96 in July 2024)  -Pain control with Tylenol, Toradol, lidocaine patch  -PT OT    # Normocytic anemia  -Hemoglobin 11.7, hematocrit 36.3 (baseline hemoglobin between 11 and 12)  -No signs of active bleeding at this time  -Iron, TIBC, ferritin ordered  -Follow a.m. CBC  -Transfuse hemoglobin less than 7    # HTN  #HLD  #CVA  #OA  -Continue home medications    Diet: Regular  DVT prophylaxis: Lovenox  Consultants: PT/OT  CODE STATUS: Full code    Dispo: Anticipate 1-2 midnights for PT/OT evaluation and arrangement for placement       Assessment and plan to be discussed with attending  Jessica Garcia, DO  Internal medicine, PGY 3

## 2024-09-16 ENCOUNTER — APPOINTMENT (OUTPATIENT)
Dept: CARDIOLOGY | Facility: HOSPITAL | Age: 89
End: 2024-09-16
Payer: MEDICARE

## 2024-09-16 LAB
ALBUMIN SERPL BCP-MCNC: 3.5 G/DL (ref 3.4–5)
ANION GAP SERPL CALC-SCNC: 11 MMOL/L (ref 10–20)
ATRIAL RATE: 62 BPM
ATRIAL RATE: 63 BPM
BASOPHILS # BLD AUTO: 0.11 X10*3/UL (ref 0–0.1)
BASOPHILS NFR BLD AUTO: 1.3 %
BUN SERPL-MCNC: 26 MG/DL (ref 6–23)
CALCIUM SERPL-MCNC: 8.6 MG/DL (ref 8.6–10.3)
CHLORIDE SERPL-SCNC: 107 MMOL/L (ref 98–107)
CO2 SERPL-SCNC: 25 MMOL/L (ref 21–32)
CREAT SERPL-MCNC: 0.76 MG/DL (ref 0.5–1.3)
CRP SERPL-MCNC: <0.1 MG/DL
D DIMER PPP FEU-MCNC: 1875 NG/ML FEU
EGFRCR SERPLBLD CKD-EPI 2021: 86 ML/MIN/1.73M*2
EOSINOPHIL # BLD AUTO: 1.06 X10*3/UL (ref 0–0.4)
EOSINOPHIL NFR BLD AUTO: 12.5 %
ERYTHROCYTE [DISTWIDTH] IN BLOOD BY AUTOMATED COUNT: 14.4 % (ref 11.5–14.5)
ERYTHROCYTE [SEDIMENTATION RATE] IN BLOOD BY WESTERGREN METHOD: 13 MM/H (ref 0–20)
GLUCOSE SERPL-MCNC: 94 MG/DL (ref 74–99)
HCT VFR BLD AUTO: 32.7 % (ref 41–52)
HGB BLD-MCNC: 10.8 G/DL (ref 13.5–17.5)
HOLD SPECIMEN: NORMAL
IMM GRANULOCYTES # BLD AUTO: 0.01 X10*3/UL (ref 0–0.5)
IMM GRANULOCYTES NFR BLD AUTO: 0.1 % (ref 0–0.9)
LYMPHOCYTES # BLD AUTO: 2.19 X10*3/UL (ref 0.8–3)
LYMPHOCYTES NFR BLD AUTO: 25.8 %
MAGNESIUM SERPL-MCNC: 2.09 MG/DL (ref 1.6–2.4)
MCH RBC QN AUTO: 29.7 PG (ref 26–34)
MCHC RBC AUTO-ENTMCNC: 33 G/DL (ref 32–36)
MCV RBC AUTO: 90 FL (ref 80–100)
MONOCYTES # BLD AUTO: 0.88 X10*3/UL (ref 0.05–0.8)
MONOCYTES NFR BLD AUTO: 10.4 %
NEUTROPHILS # BLD AUTO: 4.23 X10*3/UL (ref 1.6–5.5)
NEUTROPHILS NFR BLD AUTO: 49.9 %
NRBC BLD-RTO: 0 /100 WBCS (ref 0–0)
P AXIS: 117 DEGREES
P AXIS: 79 DEGREES
P OFFSET: 129 MS
P OFFSET: 143 MS
P ONSET: 95 MS
P ONSET: 98 MS
PHOSPHATE SERPL-MCNC: 3.5 MG/DL (ref 2.5–4.9)
PLATELET # BLD AUTO: 217 X10*3/UL (ref 150–450)
POTASSIUM SERPL-SCNC: 3.7 MMOL/L (ref 3.5–5.3)
PR INTERVAL: 234 MS
PR INTERVAL: 240 MS
PSA SERPL-MCNC: 6.24 NG/ML
Q ONSET: 215 MS
Q ONSET: 215 MS
QRS COUNT: 10 BEATS
QRS COUNT: 11 BEATS
QRS DURATION: 94 MS
QRS DURATION: 98 MS
QT INTERVAL: 446 MS
QT INTERVAL: 458 MS
QTC CALCULATION(BAZETT): 452 MS
QTC CALCULATION(BAZETT): 468 MS
QTC FREDERICIA: 451 MS
QTC FREDERICIA: 465 MS
R AXIS: 10 DEGREES
R AXIS: 9 DEGREES
RBC # BLD AUTO: 3.64 X10*6/UL (ref 4.5–5.9)
SODIUM SERPL-SCNC: 139 MMOL/L (ref 136–145)
T AXIS: 54 DEGREES
T AXIS: 60 DEGREES
T OFFSET: 438 MS
T OFFSET: 444 MS
TSH SERPL-ACNC: 2.74 MIU/L (ref 0.44–3.98)
VENTRICULAR RATE: 62 BPM
VENTRICULAR RATE: 63 BPM
VIT B12 SERPL-MCNC: 272 PG/ML (ref 211–911)
WBC # BLD AUTO: 8.5 X10*3/UL (ref 4.4–11.3)

## 2024-09-16 PROCEDURE — 2500000001 HC RX 250 WO HCPCS SELF ADMINISTERED DRUGS (ALT 637 FOR MEDICARE OP)

## 2024-09-16 PROCEDURE — 85379 FIBRIN DEGRADATION QUANT: CPT | Performed by: STUDENT IN AN ORGANIZED HEALTH CARE EDUCATION/TRAINING PROGRAM

## 2024-09-16 PROCEDURE — 85025 COMPLETE CBC W/AUTO DIFF WBC: CPT

## 2024-09-16 PROCEDURE — 99223 1ST HOSP IP/OBS HIGH 75: CPT

## 2024-09-16 PROCEDURE — 97161 PT EVAL LOW COMPLEX 20 MIN: CPT | Mod: GP

## 2024-09-16 PROCEDURE — 93306 TTE W/DOPPLER COMPLETE: CPT

## 2024-09-16 PROCEDURE — 97165 OT EVAL LOW COMPLEX 30 MIN: CPT | Mod: GO

## 2024-09-16 PROCEDURE — 80069 RENAL FUNCTION PANEL: CPT

## 2024-09-16 PROCEDURE — 83735 ASSAY OF MAGNESIUM: CPT

## 2024-09-16 PROCEDURE — 86140 C-REACTIVE PROTEIN: CPT | Performed by: STUDENT IN AN ORGANIZED HEALTH CARE EDUCATION/TRAINING PROGRAM

## 2024-09-16 PROCEDURE — 36415 COLL VENOUS BLD VENIPUNCTURE: CPT | Performed by: STUDENT IN AN ORGANIZED HEALTH CARE EDUCATION/TRAINING PROGRAM

## 2024-09-16 PROCEDURE — 2500000004 HC RX 250 GENERAL PHARMACY W/ HCPCS (ALT 636 FOR OP/ED): Performed by: STUDENT IN AN ORGANIZED HEALTH CARE EDUCATION/TRAINING PROGRAM

## 2024-09-16 PROCEDURE — 96372 THER/PROPH/DIAG INJ SC/IM: CPT

## 2024-09-16 PROCEDURE — 84443 ASSAY THYROID STIM HORMONE: CPT | Performed by: STUDENT IN AN ORGANIZED HEALTH CARE EDUCATION/TRAINING PROGRAM

## 2024-09-16 PROCEDURE — G0378 HOSPITAL OBSERVATION PER HR: HCPCS

## 2024-09-16 PROCEDURE — 82607 VITAMIN B-12: CPT | Mod: STJLAB | Performed by: STUDENT IN AN ORGANIZED HEALTH CARE EDUCATION/TRAINING PROGRAM

## 2024-09-16 PROCEDURE — 2500000002 HC RX 250 W HCPCS SELF ADMINISTERED DRUGS (ALT 637 FOR MEDICARE OP, ALT 636 FOR OP/ED)

## 2024-09-16 PROCEDURE — 97530 THERAPEUTIC ACTIVITIES: CPT | Mod: GP

## 2024-09-16 PROCEDURE — 2500000005 HC RX 250 GENERAL PHARMACY W/O HCPCS

## 2024-09-16 PROCEDURE — 85652 RBC SED RATE AUTOMATED: CPT | Performed by: STUDENT IN AN ORGANIZED HEALTH CARE EDUCATION/TRAINING PROGRAM

## 2024-09-16 PROCEDURE — 96375 TX/PRO/DX INJ NEW DRUG ADDON: CPT | Mod: 59

## 2024-09-16 PROCEDURE — 93306 TTE W/DOPPLER COMPLETE: CPT | Performed by: INTERNAL MEDICINE

## 2024-09-16 PROCEDURE — 97112 NEUROMUSCULAR REEDUCATION: CPT | Mod: GO

## 2024-09-16 PROCEDURE — 2500000001 HC RX 250 WO HCPCS SELF ADMINISTERED DRUGS (ALT 637 FOR MEDICARE OP): Performed by: STUDENT IN AN ORGANIZED HEALTH CARE EDUCATION/TRAINING PROGRAM

## 2024-09-16 PROCEDURE — 83921 ORGANIC ACID SINGLE QUANT: CPT | Mod: STJLAB | Performed by: STUDENT IN AN ORGANIZED HEALTH CARE EDUCATION/TRAINING PROGRAM

## 2024-09-16 PROCEDURE — 2500000004 HC RX 250 GENERAL PHARMACY W/ HCPCS (ALT 636 FOR OP/ED)

## 2024-09-16 RX ORDER — AMOXICILLIN 250 MG
3 CAPSULE ORAL 2 TIMES DAILY
Status: DISCONTINUED | OUTPATIENT
Start: 2024-09-16 | End: 2024-09-17

## 2024-09-16 RX ORDER — POLYETHYLENE GLYCOL 3350 17 G/17G
51 POWDER, FOR SOLUTION ORAL 2 TIMES DAILY
Status: DISCONTINUED | OUTPATIENT
Start: 2024-09-16 | End: 2024-09-17

## 2024-09-16 ASSESSMENT — COGNITIVE AND FUNCTIONAL STATUS - GENERAL
DRESSING REGULAR LOWER BODY CLOTHING: A LITTLE
TOILETING: A LOT
DAILY ACTIVITIY SCORE: 16
HELP NEEDED FOR BATHING: A LITTLE
MOVING FROM LYING ON BACK TO SITTING ON SIDE OF FLAT BED WITH BEDRAILS: A LITTLE
CLIMB 3 TO 5 STEPS WITH RAILING: A LOT
TOILETING: A LITTLE
DRESSING REGULAR UPPER BODY CLOTHING: A LITTLE
WALKING IN HOSPITAL ROOM: A LITTLE
MOVING TO AND FROM BED TO CHAIR: A LITTLE
TURNING FROM BACK TO SIDE WHILE IN FLAT BAD: A LITTLE
WALKING IN HOSPITAL ROOM: A LOT
CLIMB 3 TO 5 STEPS WITH RAILING: A LOT
MOVING FROM LYING ON BACK TO SITTING ON SIDE OF FLAT BED WITH BEDRAILS: A LITTLE
MOVING TO AND FROM BED TO CHAIR: A LITTLE
TURNING FROM BACK TO SIDE WHILE IN FLAT BAD: A LITTLE
DRESSING REGULAR LOWER BODY CLOTHING: A LOT
DAILY ACTIVITIY SCORE: 20
PERSONAL GROOMING: A LITTLE
MOBILITY SCORE: 16
TURNING FROM BACK TO SIDE WHILE IN FLAT BAD: A LITTLE
DRESSING REGULAR LOWER BODY CLOTHING: A LITTLE
DAILY ACTIVITIY SCORE: 20
TOILETING: A LITTLE
MOVING TO AND FROM BED TO CHAIR: A LITTLE
HELP NEEDED FOR BATHING: A LOT
DRESSING REGULAR UPPER BODY CLOTHING: A LITTLE
STANDING UP FROM CHAIR USING ARMS: A LITTLE
CLIMB 3 TO 5 STEPS WITH RAILING: A LOT
STANDING UP FROM CHAIR USING ARMS: A LITTLE
WALKING IN HOSPITAL ROOM: A LOT
DRESSING REGULAR UPPER BODY CLOTHING: A LITTLE
MOVING FROM LYING ON BACK TO SITTING ON SIDE OF FLAT BED WITH BEDRAILS: A LITTLE
HELP NEEDED FOR BATHING: A LITTLE
MOBILITY SCORE: 16
STANDING UP FROM CHAIR USING ARMS: A LITTLE
MOBILITY SCORE: 17

## 2024-09-16 ASSESSMENT — PAIN SCALES - GENERAL
PAINLEVEL_OUTOF10: 0 - NO PAIN
PAINLEVEL_OUTOF10: 0 - NO PAIN

## 2024-09-16 ASSESSMENT — ACTIVITIES OF DAILY LIVING (ADL): BATHING_ASSISTANCE: MODERATE

## 2024-09-16 ASSESSMENT — PAIN - FUNCTIONAL ASSESSMENT
PAIN_FUNCTIONAL_ASSESSMENT: 0-10

## 2024-09-16 NOTE — PROGRESS NOTES
09/16/24 1244   Discharge Planning   Living Arrangements Alone   Support Systems Children;Family members;Friends/neighbors   Assistance Needed yes   Type of Residence Private residence   Home or Post Acute Services Post acute facilities (Rehab/SNF/etc);In home services   Type of Post Acute Facility Services Rehab;Skilled nursing   Type of Home Care Services Home OT;Home PT;Home health aide   Expected Discharge Disposition SNF   Does the patient need discharge transport arranged? Yes   RoundTrip coordination needed? Yes   Has discharge transport been arranged? No   Financial Resource Strain   How hard is it for you to pay for the very basics like food, housing, medical care, and heating? Not hard   Housing Stability   In the last 12 months, was there a time when you were not able to pay the mortgage or rent on time? N   At any time in the past 12 months, were you homeless or living in a shelter (including now)? N   Transportation Needs   In the past 12 months, has lack of transportation kept you from medical appointments or from getting medications? no     Met with patient at the bedside, confirmed demographics, insurance, pcp is Dr. Frank. Patient lives alone and uses a cane to ambulate. He does state he has 4 children who help as well as his neighbor. He states he still drives and does his own grocery shopping, pharmacy , and appointments.  He states he is able to purchase his medications, received education, and takes as ordered. He wants to return home and feels he does not additional therapy or rehab.  I will follow up with patient after therapy enters their recommendations.

## 2024-09-16 NOTE — PROGRESS NOTES
Benjamin King is a 89 y.o. male on day 0 of admission presenting with Back pain due to injury.    Subjective   No acute events overnight.    Patient seen and examined this morning. On early morning rounds, states that his back pain was not present secondary to just waking up.  He has not had severe back pain since coming to the hospital.  States that lidocaine patches are helping his back pain.  He continues to have no red flag symptoms.  He is currently saturating appropriately on room air, ANO x 3.  Given abnormalities in his blood work to include anemia and eosinophilia, entertained when his patient's last colonoscopy was (25 to 30 years ago).  He has not recently had any hematochezia, melena or other changes to his stool.  Reports that in the past year, he has had nuclear studies as it pertains to following his prostate cancer.  Recent PSA noting that he has downtrended. He endorses mild constipation.  The patient currently lives at home and obtains help from his neighbor.  Upon interviewing the patient if he would be agreeable to going to a skilled nursing facility for rehab services if PT OT deems that he would benefit from this therapy, patient is agreeable.  No other complaints at this time.    Objective     Physical Exam  General: Awake, alert/oriented x4, well developed, no acute distress  Head: Atraumatic/Normocephalic  Eyes: Normal external exam, EOMI, PERRLA  Cardiovascular: 3/6 systolic murmur noted, no rubs, or gallops, radial pulses +2.  2+ pitting edema bilaterally  Pulmonary: CTAB, no respiratory distress. No wheezes, rales, or ronchi  Abdomen: +BS, soft, non-tender, nondistended, no guarding or rebound, no masses noted  MSK: No joint swelling, normal movements of all extremities. Range of motion- normal.  Extremities: FROM, wounds, or contusions  Skin- No lesions, contusions, or erythema.  Neuro: Alert/oriented x4, no focal motor or sensory deficits  Last Recorded Vitals  Blood pressure 114/69,  "pulse 51, temperature 36.1 °C (97 °F), temperature source Temporal, resp. rate 18, height 1.676 m (5' 6\"), weight 80.7 kg (178 lb), SpO2 96%.  Intake/Output last 3 Shifts:  I/O last 3 completed shifts:  In: 280 (3.5 mL/kg) [P.O.:280]  Out: - (0 mL/kg)   Weight: 80.7 kg     Relevant Results  Scheduled medications  amLODIPine, 10 mg, oral, Daily  aspirin, 81 mg, oral, Daily  enoxaparin, 40 mg, subcutaneous, q24h  iron sucrose, 300 mg, intravenous, Daily  lidocaine, 1 patch, transdermal, Daily  polyethylene glycol, 51 g, oral, BID  primidone, 50 mg, oral, Nightly  sennosides-docusate sodium, 3 tablet, oral, BID  tamsulosin, 0.4 mg, oral, Daily      Continuous medications     PRN medications  PRN medications: acetaminophen, ketorolac  Results from last 7 days   Lab Units 09/16/24  0456 09/15/24  1340 09/13/24  1313   WBC AUTO x10*3/uL 8.5 8.0 9.2   RBC AUTO x10*6/uL 3.64* 3.99* 3.99*   HEMOGLOBIN g/dL 10.8* 11.7* 11.8*     Results from last 7 days   Lab Units 09/16/24  0456 09/15/24  1340 09/13/24  1313   SODIUM mmol/L 139 140 139   POTASSIUM mmol/L 3.7 3.7 3.7   CHLORIDE mmol/L 107 106 106   CO2 mmol/L 25 26 22   BUN mg/dL 26* 27* 25*   CREATININE mg/dL 0.76 0.87 0.90   CALCIUM mg/dL 8.6 9.0 9.0   PHOSPHORUS mg/dL 3.5  --   --    MAGNESIUM mg/dL 2.09  --   --    BILIRUBIN TOTAL mg/dL  --  1.2 1.2   ALT U/L  --  13 14   AST U/L  --  14 17       Electrocardiogram, 12-lead PRN ACS symptoms    Result Date: 9/16/2024  Sinus rhythm with 1st degree AV block with Possible Premature atrial complexes with Aberrant conduction Nonspecific T wave abnormality Prolonged QT Abnormal ECG When compared with ECG of 15-SEP-2024 16:26, (unconfirmed) Nonspecific T wave abnormality, worse in Lateral leads    ECG 12 Lead    Result Date: 9/16/2024  Sinus rhythm with 1st degree AV block with Premature atrial complexes Nonspecific ST and T wave abnormality Abnormal ECG When compared with ECG of 06-JAN-2017 12:44, Sinus rhythm has replaced Atrial " fibrillation Nonspecific T wave abnormality now evident in Inferior leads    XR chest 1 view    Result Date: 9/15/2024  Interpreted By:  Yazmin Etienne, STUDY: Chest, single AP view.   INDICATION: Signs/Symptoms:SOB, weakness, eosinophilia.   COMPARISON: 07/07/2021   ACCESSION NUMBER(S): [id number]   ORDERING CLINICIAN: BREANNA DONALDSON   FINDINGS: The cardiac silhouette size is within normal limits. There is no focal consolidation, edema or pneumothorax. No sizeable pleural effusion.   Bibasilar atelectatic changes of the lungs noted.   No acute osseous abnormality. Moderate S shaped thoracolumbar scoliosis noted.       1. No focal pulmonary consolidation. 2. Moderate S shaped thoracolumbar scoliosis.   MACRO: None.   Signed by: Yazmin Etienne 9/15/2024 7:14 PM Dictation workstation:   [id number]      Assessment/Plan   Principal Problem:    Back pain due to injury    Benjamin King is an 89 year old male with a PMHx of prostate cancer (diagnosed in 2021) on leuprolide (last treated in October 2023) status post Eligard shot in April 2024, hypertension, OA, HLD, CVA in 2017, mild carotid artery disease (50% bilaterally in 2020) who presented to the [facility] ED due to worsening right sided back pain as well as several recent falls due to pain and weakness.  Admitted for PT/OT evaluation and potential placement.  Additionally, has a systolic murmur, pending repeat TTE.  Hospital course complicated by constipation receiving enemas and suppositories.  Venofer added for anemia.     # Right sided back pain, improved  # Weakness/deconditioning possibly secondary to history of malignancy  # Prostate cancer on leuprolide/Eligard  -CT abdomen pelvis discussed above.  No evidence concerning for metastatic disease to the bone at this time.  -PSA downtrending.  No indication to repeat nuclear imaging study at this time.   -Pain control with Tylenol, Toradol, lidocaine patch  -PT OT pending     # 3/6 systolic murmur  -Follows  with Dr. Arellano outpatient  -Last TTE in 2017  -Pending repeat TTE    # Normocytic anemia  -Hgb 10.8, stable  -Low ferritin at 23.  Decreased percent saturation 14  -No signs of active bleeding at this time.  No recent changes to bowel habits to include hematochezia or melena.  CRP negative.  -Start Venofer x 3 days in the setting of anemia  -Transfuse hemoglobin less than 7    # Constipation  -Multimodal constipation regimen with enemas and suppositories along with orals  -Last colonoscopy 25 to 30 years ago.  -Trend for BM     # HTN  #HLD  #CVA  #OA  -Continue home medications     Diet: Regular  DVT prophylaxis: Lovenox  Consultants: PT/OT  CODE STATUS: Full code      Patient seen and discussed with attending physician    Petr Parikh, DO  Internal Medicine, PGY-III

## 2024-09-16 NOTE — PROGRESS NOTES
Occupational Therapy    Occupational Therapy    Evaluation    Patient Name: Benjamin King  MRN: [id number]  Today's Date: 9/16/2024  Time Calculation  Start Time: 1131  Stop Time: 1156  Time Calculation (min): 25 min  3135/3135-A    Assessment  IP OT Assessment  OT Assessment: Pt pleasant and cooperative. Pt agrees he is weaker then baseline level of function, with increased risk for falls. Recommend AR to increase safety and independence with ADL's  Prognosis: Good  End of Session Communication: Bedside nurse  End of Session Patient Position: Bed, 3 rail up, Alarm on    Plan:  Treatment Interventions: ADL retraining, Functional transfer training, UE strengthening/ROM, Endurance training, Neuromuscular reeducation  OT Frequency: 3 times per week  OT Discharge Recommendations: High intensity level of continued care (AR)  Equipment Recommended upon Discharge: Wheeled walker  OT Recommended Transfer Status: Minimal assist, Assist of 2  OT - OK to Discharge: Yes (to next level of care)    Subjective     Current Problem:  1. Acute bilateral low back pain without sciatica        2. Murmur, cardiac  Transthoracic Echo (TTE) Complete    Transthoracic Echo (TTE) Complete      3. Lower extremity edema  Vascular US lower extremity venous duplex bilateral    Vascular US lower extremity venous duplex bilateral          General:  General  Reason for Referral: ADL's; Safety Assessment  Referred By: Lewis Palmer  Past Medical History Relevant to Rehab: active prostate cancer, HTN, CAD, Afib, CVA (2017)  Family/Caregiver Present: Yes  Caregiver Feedback: step daughter sleeping in bedside chair  Co-Treatment: PT  Prior to Session Communication: Bedside nurse  Patient Position Received: Bed, 3 rail up, Alarm on  General Comment: [place]; hears better out of L ear      General Visit Information:  Per EMR: pt presented to the [facility] emergency department due to right-sided back pain that has been persistent however  "worsening the past 5 days.  Pain radiates to the right hip.  Denies any red flag signs such as saddle anesthesia, bowel/bladder incontinence, lower extremity numbness/tingling sensation.  Patient also notes of some recent falls that were mechanical in nature due to feeling weak.  Of note, the patient was recently seen in the ED 2 days ago for similar pain in which CT findings revealed nonobstructing bilateral renal stones measuring 0.9 and 0.8 cm.  No evidence of hydronephrosis.  Enlarged prostate.  Constipation without bowel obstruction.  Patient's pain was treated and he was discharged home.  Patient states upon arrival home he was unable to get out of bed and perform his ADLs secondary to pain.  This morning patient attempted to go to [employer] however could not move due to significant pain therefore called his friend for help.  Patient lives at home by himself in [place] and states because of his pain he has not been able to do much around the home.      On arrival, pt supine in bed.  Pt in no apparent distress and agreeable to therapy.    Precautions:  Medical Precautions: Fall precautions  Precautions Comment: bed/chair alarm    Pain:  Pain Assessment  Pain Assessment: 0-10  0-10 (Numeric) Pain Score:  (pt reports 0/10 pain at rest; reports increased pain (R flank) with position changes and in sitting but does not rate, pt does yell out in pain when occurs, and stated that the pain is \"really bad, and makes him stop what he is doing\".    Objective     Cognition:  Orientation Level: Disoriented to time (pt initiatally states the current year as 2010; otherwise A&O)     Home Living/PLOF:  Pt lives alone in house with 0 DAMIEN.  Pt has 1 floor set up aside from 2 steps to kitchen level with GB.  Has tub shower with bench and Gbs.  Indep with ADLs.  Mod I for mobility using SPC (mostly) but also uses FWW when feeling weaker or for prolonged distances.  Pt drives, shops, cooks, cleans, and does laundry.  Pt " reports x1 recent mechanical fall.  Pt states he has a neighbor who assists him and checks on him.  Pt states he takes care of his own yard work and cuts his own lawn with riding mower.     ADL:  Eating Assistance: Stand by  Grooming Assistance:  (CGA)  Bathing Assistance: Moderate  UE Dressing Assistance: Minimal  LE Dressing Assistance: Moderate  Toileting Assistance with Device: Moderate    Activity Tolerance:  Endurance: Tolerates 10 - 20 min exercise with multiple rests    Functional Mobility:  Bed mobility  Supine to sit: CGA with increased time and effort; increased R flank pain in sitting and when using arms     Sit to supine: Min A x1; assist required at BLE     Transfers  Sit to stand: Min A x2; Vcs for hand placement and to stand fully erect     Stand to sit: Min A x2; Vcs for hand placement and safety      Ambulation/Stairs  Pt ambulated 12 ft with Min A x1-2 and FWW; pt requires frequent Vcs to stand fully erect while ambulating and regresses to forward flexed hunched posture with minimal ability to self-correct    Sitting Balance:  Static Sitting Balance  Static Sitting-Comment/Number of Minutes: fair+  Dynamic Sitting Balance  Dynamic Sitting-Comments: fair    Standing Balance:  Static Standing Balance  Static Standing-Comment/Number of Minutes: fair-  Dynamic Standing Balance  Dynamic Standing-Comments: poor    Sensation:  Sensation Comment: pt denies any numbness/tingling    Strength:  Strength Comments: B UE's WFL    Extremities: RUE   RUE : Within Functional Limits and LUE   LUE: Within Functional Limits    Outcome Measures: [facility] Daily Activity  Putting on and taking off regular lower body clothing: A lot  Bathing (including washing, rinsing, drying): A lot  Putting on and taking off regular upper body clothing: A little  Toileting, which includes using toilet, bedpan or urinal: A lot  Taking care of personal grooming such as brushing teeth: A little  Eating Meals: None  Daily Activity - Total  Score: 16       EDUCATION:  Education  Individual(s) Educated: Patient  Education Provided:  (safety)  Education Documentation  Body Mechanics, taught by Jeanna Tejeda OT at 9/16/2024  7:12 PM.  Learner: Patient  Readiness: Acceptance  Method: Explanation  Response: Verbalizes Understanding    Precautions, taught by Jeanna Tejeda OT at 9/16/2024  7:12 PM.  Learner: Patient  Readiness: Acceptance  Method: Explanation  Response: Verbalizes Understanding    Education Comments  No comments found.        Goals:   Encounter Problems       Encounter Problems (Active)       OT Goals       OT Goal 1 (Progressing)       Start:  09/16/24    Expected End:  09/30/24       Pt will complete all bed mobility with modified independence safely            OT Goal 2 (Progressing)       Start:  09/16/24    Expected End:  09/30/24       Pt with complete all transfers safely with modified independence           OT Goal 3 (Progressing)       Start:  09/16/24    Expected End:  09/30/24       Pt will complete ADL's and mobility with good sit balance and fair+ stand balance            OT Goal 4 (Progressing)       Start:  09/16/24    Expected End:  09/30/24       Pt will complete LB dressing with SBA using adaptive device as needed           OT Goal 5 (Progressing)       Start:  09/16/24    Expected End:  09/30/24       Pt will complete grooming ADL's with supervision and good stand balance                 Treatment: Pt was able to complete bed mobility with SBA with increased effort and time with increased R flank pain. Pt with fair+ sit balance on EOB. Pt required Min A of 2 to complete sit-stand to wheeled walker. Pt again with increased R flank/low back pain. Pt ambulated in room with Min A using wheeled walker. Pt with increased weakness the longer ambulating with increased hip and knee flexion and increased kyphosis. Pt was able to ambulate back to EOB with Min A, and transfer back to EOB. Pt required Min A to raise LE's back into bed  with increased R flank pain. Once in bed, pt required Max A to reposition self. Pt remained in bed with bed alarm on and call light within reach.

## 2024-09-16 NOTE — PROGRESS NOTES
Physical Therapy    Physical Therapy Evaluation    Patient Name: Benjamin King  MRN: [id number]  Today's Date: 9/16/2024   Time Calculation  Start Time: 1133  Stop Time: 1157  Time Calculation (min): 24 min  3135/3135-A    Assessment/Plan   PT Assessment: Pt demonstrates decreased strength, decreased endurance, decreased activity tolerance, and increased pain.  Pt appears below baseline level of function and based on current level of function, pt would benefit from continued skilled therapy while in the hospital to ensure safety, decrease risk of falls, and regain strength/mobility back to baseline.  Once stable enough for discharge, pt would benefit from high intensity therapy prior to returning home.     PT Assessment Results: Decreased strength, Decreased range of motion, Decreased endurance, Impaired balance, Decreased mobility, Pain, Impaired hearing  Rehab Prognosis: Good  Evaluation/Treatment Tolerance: Patient limited by pain, Patient limited by fatigue  Medical Staff Made Aware: Yes  End of Session Communication: Bedside nurse  End of Session Patient Position: Bed, 3 rail up, Alarm on  IP OR SWING BED PT PLAN  Inpatient or Swing Bed: Inpatient  PT Plan  Treatment/Interventions: Bed mobility, Transfer training, Gait training, Stair training, Balance training, Neuromuscular re-education, Strengthening, Endurance training, Range of motion, Therapeutic exercise, Therapeutic activity, Home exercise program  PT Plan: Ongoing PT  PT Frequency: 5 times per week  PT Discharge Recommendations: High intensity level of continued care  Equipment Recommended upon Discharge: Wheeled walker  PT Recommended Transfer Status: Assist x1, Assist x2 (Min A)  PT - OK to Discharge: Yes - To next level of care when cleared by medical team    Subjective     Current Problem:  1. Acute bilateral low back pain without sciatica        2. Murmur, cardiac  Transthoracic Echo (TTE) Complete    Transthoracic Echo (TTE) Complete           Past Medical History:  Patient Active Problem List   Diagnosis    Bilateral leg numbness    CA prostate, adenoca (Multi)    Elevated PSA    Essential hypertension    Fatigue    Frequent urinary incontinence    Hypogonadism, male    Knee pain, right    Mild shortness of breath    Other hyperlipidemia    Pleurisy    Primary osteoarthritis involving multiple joints    Vertigo    Chronic atrial fibrillation (Multi)    Bilateral carotid artery disease, unspecified type (CMS-HCC)    Back pain due to injury       General Visit Information:  Per EMR: pt presented to the [facility] emergency department due to right-sided back pain that has been persistent however worsening the past 5 days.  Pain radiates to the right hip.  Denies any red flag signs such as saddle anesthesia, bowel/bladder incontinence, lower extremity numbness/tingling sensation.  Patient also notes of some recent falls that were mechanical in nature due to feeling weak.  Of note, the patient was recently seen in the ED 2 days ago for similar pain in which CT findings revealed nonobstructing bilateral renal stones measuring 0.9 and 0.8 cm.  No evidence of hydronephrosis.  Enlarged prostate.  Constipation without bowel obstruction.  Patient's pain was treated and he was discharged home.  Patient states upon arrival home he was unable to get out of bed and perform his ADLs secondary to pain.  This morning patient attempted to go to [employer] however could not move due to significant pain therefore called his friend for help.  Patient lives at home by himself in [place] and states because of his pain he has not been able to do much around the home.      On arrival, pt supine in bed.  Pt in no apparent distress and agreeable to therapy.    General  Reason for Referral: impaired mobility, gait training  Referred By: Lewis Palmer  Past Medical History Relevant to Rehab: active prostate cancer, HTN, CAD, Afib, CVA  (2017)  Family/Caregiver Present: Yes  Caregiver Feedback: step daughter sleeping in bedside chair  Co-Treatment: OT  Prior to Session Communication: Bedside nurse  Patient Position Received: Bed, 3 rail up, Alarm on  General Comment: [place]; hears better out of L ear    Home Living/PLOF:  Pt lives alone in house with 0 DAMIEN.  Pt has 1 floor set up aside from 2 steps to kitchen level with GB.  Has tub shower with bench and Gbs.  Indep with ADLs.  Mod I for mobility using SPC (mostly) but also uses FWW when feeling weaker or for prolonged distances.  Pt drives, shops, cooks, cleans, and does laundry.  Pt reports x1 recent mechanical fall.  Pt states he has a neighbor who assists him and checks on him.  Pt states he takes care of his own yard work and cuts his own lawn with riding mower.     Precautions:  Precautions  Medical Precautions: Fall precautions     Objective     Pain:  Pain Assessment  Pain Assessment: 0-10  0-10 (Numeric) Pain Score:  (pt reports 0/10 pain at rest; reports increased pain (R flank) with position changes and in sitting but does not rate)  Pain Interventions:  (pt currently has lidocaine patch)    Cognition:  Cognition  Orientation Level: Disoriented to time (pt initiatally states the current year as 2010; otherwise A&O)    General Assessments:      Activity Tolerance  Endurance: Tolerates 10 - 20 min exercise with multiple rests  Sensation  Sensation Comment: pt denies any numbness/tingling    Static Sitting Balance  Static Sitting-Comment/Number of Minutes: fair plus  Dynamic Sitting Balance  Dynamic Sitting-Comments: fair  Static Standing Balance  Static Standing-Comment/Number of Minutes: fair  Dynamic Standing Balance  Dynamic Standing-Comments: fair minus    Extremity/Trunk Assessments:  BLE strength: appears WFL    Functional Mobility:  Bed mobility  Supine to sit: CGA with increased time and effort; increased R flank pain in sitting and when using arms    Sit to supine: Min A x1; assist  required at BLE    Transfers  Sit to stand: Min A x2; Vcs for hand placement and to stand fully erect    Stand to sit: Min A x2; Vcs for hand placement and safety     Ambulation/Stairs  Pt ambulated 12 ft with Min A x1-2 and FWW; pt requires frequent Vcs to stand fully erect while ambulating and regresses to forward flexed hunched posture with minimal ability to self-correct    Outcome Measures:  [facility] Basic Mobility  Turning from your back to your side while in a flat bed without using bedrails: A little  Moving from lying on your back to sitting on the side of a flat bed without using bedrails: A little  Moving to and from bed to chair (including a wheelchair): A little  Standing up from a chair using your arms (e.g. wheelchair or bedside chair): A little  To walk in hospital room: A little  Climbing 3-5 steps with railing: A lot  Basic Mobility - Total Score: 17    Goals:  Encounter Problems       Encounter Problems (Active)       PT Problem       Pt will be able to perform all bed mobility tasks with Mod I.  (Progressing)       Start:  09/16/24    Expected End:  09/30/24            Pt will perform all transfers with Mod I and FWW with proper safety mechanics.   (Progressing)       Start:  09/16/24    Expected End:  09/30/24            Pt will ambulate 100 ft using Mod I and FWW for improved functional independence.  (Progressing)       Start:  09/16/24    Expected End:  09/30/24            Pt will be able to negotiate 2 steps with 1 HR with SBA.  (Progressing)       Start:  09/16/24    Expected End:  09/30/24                 Education Documentation  Body Mechanics, taught by Teresa Wyatt PT at 9/16/2024 12:49 PM.  Learner: Patient  Readiness: Acceptance  Method: Explanation  Response: Verbalizes Understanding    Home Exercise Program, taught by Teresa Wyatt PT at 9/16/2024 12:49 PM.  Learner: Patient  Readiness: Acceptance  Method: Explanation  Response: Verbalizes Understanding    Mobility Training,  taught by Teresa Wyatt, PT at 9/16/2024 12:49 PM.  Learner: Patient  Readiness: Acceptance  Method: Explanation  Response: Verbalizes Understanding    Education Comments  No comments found.

## 2024-09-17 ENCOUNTER — APPOINTMENT (OUTPATIENT)
Dept: CARDIOLOGY | Facility: HOSPITAL | Age: 89
End: 2024-09-17
Payer: MEDICARE

## 2024-09-17 LAB
ALBUMIN SERPL BCP-MCNC: 3.4 G/DL (ref 3.4–5)
ANION GAP SERPL CALC-SCNC: 13 MMOL/L (ref 10–20)
BUN SERPL-MCNC: 29 MG/DL (ref 6–23)
CALCIUM SERPL-MCNC: 8.7 MG/DL (ref 8.6–10.3)
CHLORIDE SERPL-SCNC: 105 MMOL/L (ref 98–107)
CO2 SERPL-SCNC: 23 MMOL/L (ref 21–32)
CREAT SERPL-MCNC: 0.81 MG/DL (ref 0.5–1.3)
EGFRCR SERPLBLD CKD-EPI 2021: 85 ML/MIN/1.73M*2
ERYTHROCYTE [DISTWIDTH] IN BLOOD BY AUTOMATED COUNT: 14.6 % (ref 11.5–14.5)
GLUCOSE SERPL-MCNC: 89 MG/DL (ref 74–99)
HCT VFR BLD AUTO: 33.6 % (ref 41–52)
HGB BLD-MCNC: 11.2 G/DL (ref 13.5–17.5)
MAGNESIUM SERPL-MCNC: 2.05 MG/DL (ref 1.6–2.4)
MCH RBC QN AUTO: 29.9 PG (ref 26–34)
MCHC RBC AUTO-ENTMCNC: 33.3 G/DL (ref 32–36)
MCV RBC AUTO: 90 FL (ref 80–100)
NRBC BLD-RTO: 0 /100 WBCS (ref 0–0)
PHOSPHATE SERPL-MCNC: 3.9 MG/DL (ref 2.5–4.9)
PLATELET # BLD AUTO: 222 X10*3/UL (ref 150–450)
POTASSIUM SERPL-SCNC: 3.8 MMOL/L (ref 3.5–5.3)
RBC # BLD AUTO: 3.75 X10*6/UL (ref 4.5–5.9)
SODIUM SERPL-SCNC: 137 MMOL/L (ref 136–145)
WBC # BLD AUTO: 11.1 X10*3/UL (ref 4.4–11.3)

## 2024-09-17 PROCEDURE — 83735 ASSAY OF MAGNESIUM: CPT

## 2024-09-17 PROCEDURE — 93970 EXTREMITY STUDY: CPT | Performed by: STUDENT IN AN ORGANIZED HEALTH CARE EDUCATION/TRAINING PROGRAM

## 2024-09-17 PROCEDURE — 96372 THER/PROPH/DIAG INJ SC/IM: CPT

## 2024-09-17 PROCEDURE — 93970 EXTREMITY STUDY: CPT

## 2024-09-17 PROCEDURE — 2500000005 HC RX 250 GENERAL PHARMACY W/O HCPCS

## 2024-09-17 PROCEDURE — G0378 HOSPITAL OBSERVATION PER HR: HCPCS

## 2024-09-17 PROCEDURE — 80069 RENAL FUNCTION PANEL: CPT

## 2024-09-17 PROCEDURE — 97110 THERAPEUTIC EXERCISES: CPT | Mod: GP,CQ

## 2024-09-17 PROCEDURE — 97116 GAIT TRAINING THERAPY: CPT | Mod: GP,CQ

## 2024-09-17 PROCEDURE — 99233 SBSQ HOSP IP/OBS HIGH 50: CPT

## 2024-09-17 PROCEDURE — 85027 COMPLETE CBC AUTOMATED: CPT

## 2024-09-17 PROCEDURE — 36415 COLL VENOUS BLD VENIPUNCTURE: CPT

## 2024-09-17 PROCEDURE — 96376 TX/PRO/DX INJ SAME DRUG ADON: CPT

## 2024-09-17 PROCEDURE — 2500000002 HC RX 250 W HCPCS SELF ADMINISTERED DRUGS (ALT 637 FOR MEDICARE OP, ALT 636 FOR OP/ED)

## 2024-09-17 PROCEDURE — 2500000001 HC RX 250 WO HCPCS SELF ADMINISTERED DRUGS (ALT 637 FOR MEDICARE OP): Performed by: STUDENT IN AN ORGANIZED HEALTH CARE EDUCATION/TRAINING PROGRAM

## 2024-09-17 PROCEDURE — 2500000004 HC RX 250 GENERAL PHARMACY W/ HCPCS (ALT 636 FOR OP/ED)

## 2024-09-17 PROCEDURE — 2500000004 HC RX 250 GENERAL PHARMACY W/ HCPCS (ALT 636 FOR OP/ED): Performed by: STUDENT IN AN ORGANIZED HEALTH CARE EDUCATION/TRAINING PROGRAM

## 2024-09-17 PROCEDURE — 2500000001 HC RX 250 WO HCPCS SELF ADMINISTERED DRUGS (ALT 637 FOR MEDICARE OP)

## 2024-09-17 PROCEDURE — 97530 THERAPEUTIC ACTIVITIES: CPT | Mod: GP,CQ

## 2024-09-17 RX ORDER — POLYETHYLENE GLYCOL 3350 17 G/17G
17 POWDER, FOR SOLUTION ORAL DAILY
Status: DISCONTINUED | OUTPATIENT
Start: 2024-09-18 | End: 2024-09-17

## 2024-09-17 RX ORDER — POLYETHYLENE GLYCOL 3350 17 G/17G
34 POWDER, FOR SOLUTION ORAL 2 TIMES DAILY
Status: DISCONTINUED | OUTPATIENT
Start: 2024-09-17 | End: 2024-09-18 | Stop reason: HOSPADM

## 2024-09-17 RX ORDER — AMOXICILLIN 250 MG
2 CAPSULE ORAL NIGHTLY
Status: DISCONTINUED | OUTPATIENT
Start: 2024-09-17 | End: 2024-09-17

## 2024-09-17 RX ORDER — AMOXICILLIN 250 MG
3 CAPSULE ORAL 2 TIMES DAILY
Status: DISCONTINUED | OUTPATIENT
Start: 2024-09-17 | End: 2024-09-18 | Stop reason: HOSPADM

## 2024-09-17 ASSESSMENT — COGNITIVE AND FUNCTIONAL STATUS - GENERAL
DAILY ACTIVITIY SCORE: 20
STANDING UP FROM CHAIR USING ARMS: A LITTLE
TOILETING: A LITTLE
STANDING UP FROM CHAIR USING ARMS: A LITTLE
DAILY ACTIVITIY SCORE: 20
CLIMB 3 TO 5 STEPS WITH RAILING: A LOT
MOBILITY SCORE: 16
MOBILITY SCORE: 15
TURNING FROM BACK TO SIDE WHILE IN FLAT BAD: A LITTLE
HELP NEEDED FOR BATHING: A LITTLE
DRESSING REGULAR LOWER BODY CLOTHING: A LITTLE
MOVING FROM LYING ON BACK TO SITTING ON SIDE OF FLAT BED WITH BEDRAILS: A LITTLE
CLIMB 3 TO 5 STEPS WITH RAILING: TOTAL
DRESSING REGULAR UPPER BODY CLOTHING: A LITTLE
MOVING TO AND FROM BED TO CHAIR: A LITTLE
WALKING IN HOSPITAL ROOM: A LOT
MOVING TO AND FROM BED TO CHAIR: A LITTLE
MOVING FROM LYING ON BACK TO SITTING ON SIDE OF FLAT BED WITH BEDRAILS: A LITTLE
TURNING FROM BACK TO SIDE WHILE IN FLAT BAD: A LITTLE
MOVING FROM LYING ON BACK TO SITTING ON SIDE OF FLAT BED WITH BEDRAILS: A LOT
DRESSING REGULAR UPPER BODY CLOTHING: A LITTLE
MOVING TO AND FROM BED TO CHAIR: A LITTLE
TOILETING: A LITTLE
TURNING FROM BACK TO SIDE WHILE IN FLAT BAD: A LOT
MOBILITY SCORE: 15
DRESSING REGULAR LOWER BODY CLOTHING: A LITTLE
CLIMB 3 TO 5 STEPS WITH RAILING: A LOT
WALKING IN HOSPITAL ROOM: A LOT
STANDING UP FROM CHAIR USING ARMS: A LITTLE
HELP NEEDED FOR BATHING: A LITTLE
WALKING IN HOSPITAL ROOM: A LITTLE

## 2024-09-17 ASSESSMENT — PAIN DESCRIPTION - ORIENTATION
ORIENTATION: RIGHT
ORIENTATION: RIGHT

## 2024-09-17 ASSESSMENT — PAIN SCALES - GENERAL
PAINLEVEL_OUTOF10: 0 - NO PAIN
PAINLEVEL_OUTOF10: 6
PAINLEVEL_OUTOF10: 0 - NO PAIN
PAINLEVEL_OUTOF10: 1
PAINLEVEL_OUTOF10: 4

## 2024-09-17 ASSESSMENT — PAIN DESCRIPTION - LOCATION
LOCATION: HIP
LOCATION: HIP

## 2024-09-17 ASSESSMENT — PAIN - FUNCTIONAL ASSESSMENT
PAIN_FUNCTIONAL_ASSESSMENT: 0-10

## 2024-09-17 ASSESSMENT — PAIN SCALES - WONG BAKER: WONGBAKER_NUMERICALRESPONSE: NO HURT

## 2024-09-17 NOTE — PROGRESS NOTES
Physical Therapy    Physical Therapy Treatment    Patient Name: Benjamin King  MRN: [id number]  Today's Date: 9/17/2024  Time Calculation  Start Time: 1400  Stop Time: 1440  Time Calculation (min): 40 min     3135/3135-A       09/17/24 1400   PT  Visit   PT Received On 09/17/24   Response to Previous Treatment Patient with no complaints from previous session.   General   Reason for Referral ADL's; Safety Assessment   Referred By Lewis Palmer   Past Medical History Relevant to Rehab active prostate cancer, HTN, CAD, Afib, CVA (2017)   Family/Caregiver Present Yes   Prior to Session Communication Bedside nurse   Patient Position Received Bed, 3 rail up;Alarm on   Preferred Learning Style verbal;visual   General Comment patient agreeable to therapy.  no pain reported.   Precautions   Medical Precautions Fall precautions   Pain Assessment   Pain Assessment 0-10   0-10 (Numeric) Pain Score 0 - No pain   Cognition   Overall Cognitive Status WFL   Coordination   Movements are Fluid and Coordinated Yes   Static Sitting Balance   Static Sitting-Balance Support No upper extremity supported   Static Sitting-Level of Assistance Close supervision   Therapeutic Exercise   Therapeutic Exercise Performed Yes   Therapeutic Exercise Activity 1 ankle pumps x15   Therapeutic Exercise Activity 2 resisted foot press x15   Therapeutic Exercise Activity 3 heel slides x15   Therapeutic Exercise Activity 4 hip ABD x15   Therapeutic Exercise Activity 5 pillow squeeze x15   Bed Mobility   Bed Mobility Yes   Bed Mobility 1   Bed Mobility 1 Supine to sitting   Level of Assistance 1 Close supervision   Bed Mobility Comments 1 HOB up   Ambulation/Gait Training   Ambulation/Gait Training Performed Yes   Ambulation/Gait Training 1   Surface 1 Level tile   Device 1 Rolling walker   Gait Support Devices Gait belt   Assistance 1 Contact guard   Quality of Gait 1 Decreased step length;Forward flexed posture   Comments/Distance (ft) 1 5' to recliner    Transfers   Transfer Yes   Transfer 1   Transfer From 1 Bed to   Transfer to 1 Stand   Technique 1 Sit to stand;Stand to sit   Transfer Device 1 Walker;Gait belt   Transfer Level of Assistance 1 Contact guard   Trials/Comments 1 during first stand, patient became incontinent of stool and was able to remain in a squat over bed while completing BM over pad then able ot maintain static stand while pericare performed,   Transfers 2   Transfer From 2 Stand to   Transfer to 2 Chair with arms   Technique 2 Stand to sit   Transfer Device 2 Gait belt;Walker   Transfer Level of Assistance 2 Contact guard;Minimal verbal cues;Minimal tactile cues   Activity Tolerance   Endurance Endurance does not limit participation in activity   Early Mobility/Exercise Safety Screen Proceed with mobilization - No exclusion criteria met   PT Assessment   PT Assessment Results Decreased strength;Decreased endurance;Impaired balance;Decreased mobility   Rehab Prognosis Good   Medical Staff Made Aware Yes   End of Session Communication Bedside nurse   End of Session Patient Position Up in chair;Alarm on   Outpatient Education   Individual(s) Educated Patient   Education Provided Fall Risk;Body Mechanics;POC   Risk and Benefits Discussed with Patient/Caregiver/Other yes   Patient/Caregiver Demonstrated Understanding yes   Plan of Care Discussed and Agreed Upon yes   Patient Response to Education Patient/Caregiver Verbalized Understanding of Information   PT Plan   Inpatient/Swing Bed or Outpatient Inpatient   PT Plan   Treatment/Interventions Bed mobility;Transfer training;Gait training;Therapeutic exercise   PT Plan Ongoing PT   PT Discharge Recommendations High intensity level of continued care   Equipment Recommended upon Discharge Wheeled walker   PT Recommended Transfer Status Assist x1;Assistive device         Outcome Measures:  [facility] Basic Mobility  Turning from your back to your side while in a flat bed without using bedrails: A  lot  Moving from lying on your back to sitting on the side of a flat bed without using bedrails: A lot  Moving to and from bed to chair (including a wheelchair): A little  Standing up from a chair using your arms (e.g. wheelchair or bedside chair): A little  To walk in hospital room: A little  Climbing 3-5 steps with railing: A lot  Basic Mobility - Total Score: 15            EDUCATION:  Outpatient Education  Individual(s) Educated: Patient  Education Provided: Fall Risk, Body Mechanics, POC  Risk and Benefits Discussed with Patient/Caregiver/Other: yes  Patient/Caregiver Demonstrated Understanding: yes  Plan of Care Discussed and Agreed Upon: yes  Patient Response to Education: Patient/Caregiver Verbalized Understanding of Information    GOALS:  Encounter Problems       Encounter Problems (Active)       PT Problem       Pt will be able to perform all bed mobility tasks with Mod I.  (Progressing)       Start:  09/16/24    Expected End:  09/30/24            Pt will perform all transfers with Mod I and FWW with proper safety mechanics.   (Progressing)       Start:  09/16/24    Expected End:  09/30/24            Pt will ambulate 100 ft using Mod I and FWW for improved functional independence.  (Progressing)       Start:  09/16/24    Expected End:  09/30/24            Pt will be able to negotiate 2 steps with 1 HR with SBA.  (Progressing)       Start:  09/16/24    Expected End:  09/30/24               Pain - Adult

## 2024-09-17 NOTE — PROGRESS NOTES
"Benjamin King is a 89 y.o. male on day 0 of admission presenting with Back pain due to injury.    Subjective   No acute events overnight.    Patient seen and examined earlier this morning.  On initial a.m. evaluation, patient was awoken from sleep.  He states that at rest, the right lower extremity/right sided pain is very minimal.  He is receiving analgesics, for which these help.  Yesterday, was notified by PT/OT that he was in excruciating pain upon ambulating from supine to seated position.  This morning, the patient tells me that he had not ambulated enough to know if he was truly in pain or not.  Tells me early morning, he did have a bowel movement, for which he did receive increased bowel regimen yesterday.  He states that he had his TTE completed, however read is not back in at this time.  He has no other complaints at this time.    Objective     Physical Exam  General: Awake, alert/oriented x4, well developed, no acute distress  Head: Atraumatic/Normocephalic  Eyes: Normal external exam, EOMI, PERRLA  Cardiovascular: 3/6 systolic murmur noted, no rubs, or gallops, radial pulses +2. 2+ pitting edema bilaterally, right greater than left though stable from yesterday  Pulmonary: CTAB, no respiratory distress. No wheezes, rales, or ronchi  Abdomen: +BS, soft, non-tender, nondistended, no guarding or rebound, no masses noted  MSK: No joint swelling, normal movements of all extremities. Range of motion- normal.  No pain to palpation on the right lower extremity, right hip, right low back  Extremities: FROM, wounds, or contusions  Skin- No lesions, contusions, or erythema.  Neuro: Alert/oriented x4, no focal motor or sensory deficits  Last Recorded Vitals  Blood pressure 119/64, pulse 59, temperature 36 °C (96.8 °F), temperature source Temporal, resp. rate 15, height 1.676 m (5' 6\"), weight 80.7 kg (178 lb), SpO2 93%.  Intake/Output last 3 Shifts:  I/O last 3 completed shifts:  In: 830 (10.3 mL/kg) [P.O.:330; IV " Piggyback:500]  Out: 1100 (13.6 mL/kg) [Urine:1100 (0.4 mL/kg/hr)]  Weight: 80.7 kg     Relevant Results  Scheduled medications  amLODIPine, 10 mg, oral, Daily  aspirin, 81 mg, oral, Daily  enoxaparin, 40 mg, subcutaneous, q24h  iron sucrose, 300 mg, intravenous, Daily  lidocaine, 1 patch, transdermal, Daily  [START ON 9/18/2024] polyethylene glycol, 17 g, oral, Daily  primidone, 50 mg, oral, Nightly  sennosides-docusate sodium, 2 tablet, oral, Nightly  tamsulosin, 0.4 mg, oral, Daily      Continuous medications     PRN medications  PRN medications: acetaminophen, ketorolac  Results from last 7 days   Lab Units 09/17/24  0620 09/16/24  0456 09/15/24  1340   WBC AUTO x10*3/uL 11.1 8.5 8.0   RBC AUTO x10*6/uL 3.75* 3.64* 3.99*   HEMOGLOBIN g/dL 11.2* 10.8* 11.7*     Results from last 7 days   Lab Units 09/17/24  0620 09/16/24  0456 09/15/24  1340 09/13/24  1313   SODIUM mmol/L 137 139 140 139   POTASSIUM mmol/L 3.8 3.7 3.7 3.7   CHLORIDE mmol/L 105 107 106 106   CO2 mmol/L 23 25 26 22   BUN mg/dL 29* 26* 27* 25*   CREATININE mg/dL 0.81 0.76 0.87 0.90   CALCIUM mg/dL 8.7 8.6 9.0 9.0   PHOSPHORUS mg/dL 3.9 3.5  --   --    MAGNESIUM mg/dL 2.05 2.09  --   --    BILIRUBIN TOTAL mg/dL  --   --  1.2 1.2   ALT U/L  --   --  13 14   AST U/L  --   --  14 17       Vascular US lower extremity venous duplex bilateral    Result Date: 9/17/2024  Preliminary Cardiology Report            [facility] [address] [place] [address]     Tel [phone] Fax [phone]          Preliminary Vascular Lab Report  VASC US LOWER EXTREMITY VENOUS DUPLEX BILATERAL  Patient Name:     RIKKI Lares Physician:  [id number] Kyle MD Study Date:       9/17/2024       Ordering Provider:  [id number] BREANNA DONALDSON MRN/PID:          [id number]        Fellow: Accession#:       [id number]    Technologist:       Sheryl Smith YOB: 1935        Technologist 2:     Cindy Garrison RVT, EDVINMS Gender:            M               Encounter#:         [id number] Admission Status: Inpatient       Location Performed: [facility]  Diagnosis/ICD: Other specified soft tissue disorders-M79.89 Indication:    Limb swelling CPT Codes:     72980 Peripheral venous duplex scan for DVT complete  Pertinent History: HTN and Hyperlipidemia. Afib.  PRELIMINARY CONCLUSIONS:  Right Lower Venous: No evidence of acute deep vein thrombus visualized in the right lower extremity. Left Lower Venous: No evidence of acute deep vein thrombus visualized in the left lower extremity.  Imaging & Doppler Findings:  Right                 Compressible Thrombus        Flow Distal External Iliac                None   Spontaneous/Phasic CFV                       Yes        None   Spontaneous/Phasic PFV                       Yes        None FV Proximal               Yes        None   Spontaneous/Phasic FV Mid                    Yes        None FV Distal                 Yes        None Popliteal                 Yes        None   Spontaneous/Phasic Peroneal                  Yes        None PTV                       Yes        None  Left                  Compress Thrombus        Flow Distal External Iliac            None   Spontaneous/Phasic CFV                     Yes      None   Spontaneous/Phasic PFV                     Yes      None FV Proximal             Yes      None   Spontaneous/Phasic FV Mid                  Yes      None FV Distal               Yes      None Popliteal               Yes      None   Spontaneous/Phasic Peroneal                Yes      None PTV                     Yes      None  VASCULAR PRELIMINARY REPORT completed by Cindy Garrison RVT on 9/17/2024 at 9:23:38 AM  ** Final **     Electrocardiogram, 12-lead PRN ACS symptoms    Result Date: 9/16/2024  Sinus rhythm with 1st degree AV block with Possible Premature atrial complexes with Aberrant conduction Nonspecific T wave abnormality Prolonged QT Abnormal ECG When compared with  ECG of 15-SEP-2024 16:26, (unconfirmed) Nonspecific T wave abnormality, worse in Lateral leads    ECG 12 Lead    Result Date: 9/16/2024  Sinus rhythm with 1st degree AV block with Premature atrial complexes Nonspecific ST and T wave abnormality Abnormal ECG When compared with ECG of 06-JAN-2017 12:44, Sinus rhythm has replaced Atrial fibrillation Nonspecific T wave abnormality now evident in Inferior leads    XR chest 1 view    Result Date: 9/15/2024  Interpreted By:  Yazmin Etienne, STUDY: Chest, single AP view.   INDICATION: Signs/Symptoms:SOB, weakness, eosinophilia.   COMPARISON: 07/07/2021   ACCESSION NUMBER(S): [id number]   ORDERING CLINICIAN: BREANNA DONALDSON   FINDINGS: The cardiac silhouette size is within normal limits. There is no focal consolidation, edema or pneumothorax. No sizeable pleural effusion.   Bibasilar atelectatic changes of the lungs noted.   No acute osseous abnormality. Moderate S shaped thoracolumbar scoliosis noted.       1. No focal pulmonary consolidation. 2. Moderate S shaped thoracolumbar scoliosis.   MACRO: None.   Signed by: Yazmin Etienne 9/15/2024 7:14 PM Dictation workstation:   [id number]      Assessment/Plan   Principal Problem:    Back pain due to injury    Benjamin King is an 89 year old male with a PMHx of prostate cancer (diagnosed in 2021) on leuprolide (last treated in October 2023) status post Eligard shot in April 2024, hypertension, OA, HLD, CVA in 2017, mild carotid artery disease (50% bilaterally in 2020) who presented to the [facility] ED due to worsening right sided back pain as well as several recent falls due to pain and weakness.  Admitted for PT/OT evaluation and potential placement.  Additionally, has a systolic murmur, pending repeat TTE.  Hospital course complicated by constipation receiving enemas and suppositories.  Venofer added for anemia.     # Right sided back pain, improved  # Weakness/deconditioning possibly secondary to history of malignancy  #  Prostate cancer on leuprolide/Eligard  -CT abdomen pelvis discussed above.  No evidence concerning for metastatic disease to the bone at this time.  -PSA downtrending.  No indication to repeat nuclear imaging study at this time.   -Pain control with Tylenol, Toradol, lidocaine patch  -PT OT recommending acute rehab.  Patient agreeable, and referrals will be sent soon     # 3/6 systolic murmur  # Lower extremity edema (R greater than L)  -Follows with Dr. Arellano outpatient  -Last TTE in 2017  -Pending repeat TTE  -Negative bilateral DVT ultrasound lower extremities     # Normocytic anemia  -Hgb 11.2, stable  -Low ferritin at 23. Decreased percent saturation 14  -No signs of active bleeding at this time.  No recent changes to bowel habits to include hematochezia or melena.  CRP negative.  -Start Venofer x 3 days in the setting of anemia  -Low B12.  Pending MMA.  Will require outpatient follow-up  -Transfuse hemoglobin less than 7     # Constipation  -Multimodal constipation regimen with enemas and suppositories along with orals  -Last colonoscopy 25 to 30 years ago.  -Patient had a bowel movement early this morning     #HTN  #HLD  #CVA  #OA  -Continue amlodipine  -Continue aspirin     Diet: Regular  DVT prophylaxis: Lovenox  Consultants: PT/OT  CODE STATUS: Full code        Patient seen and discussed with attending physician     Petr Parikh, DO  Internal Medicine, PGY-III

## 2024-09-17 NOTE — PROGRESS NOTES
[facility] can accept.  Request to Direct Precert Team to start precert.

## 2024-09-17 NOTE — CARE PLAN
Problem: Pain  Goal: Free from acute confusion related to pain meds throughout the shift  Outcome: Progressing     Problem: Pain  Goal: Free from opioid side effects throughout the shift  Outcome: Progressing     Problem: Safety - Adult  Goal: Free from fall injury  Outcome: Progressing    The patient's goals for the shift include pain management.     The clinical goals for the shift include patient will have a bowel movement by 0700 on 9/17/24.

## 2024-09-17 NOTE — PROGRESS NOTES
09/17/24 1225   Discharge Planning   Expected Discharge Disposition SNF   Does the patient need discharge transport arranged? Yes   RoundTrip coordination needed? Yes   Has discharge transport been arranged? No     Spoke to patient and family at bedside to discuss therapy recommendations for high intensity. Both patient and family feel patient will not be able to participate in as much therapy as acute rehab, but would prefer patient to go to SNF. Per family they would prefer patient to go to [facility] stating they have a couple family members that work there and know the staff. Patient agreeable. Message sent to [facility] to request new SNF referral to be sent.

## 2024-09-18 ENCOUNTER — APPOINTMENT (OUTPATIENT)
Dept: RADIOLOGY | Facility: HOSPITAL | Age: 89
End: 2024-09-18
Payer: MEDICARE

## 2024-09-18 VITALS
HEIGHT: 66 IN | TEMPERATURE: 97.3 F | SYSTOLIC BLOOD PRESSURE: 116 MMHG | WEIGHT: 178 LBS | DIASTOLIC BLOOD PRESSURE: 56 MMHG | RESPIRATION RATE: 18 BRPM | BODY MASS INDEX: 28.61 KG/M2 | HEART RATE: 60 BPM | OXYGEN SATURATION: 96 %

## 2024-09-18 LAB
ALBUMIN SERPL BCP-MCNC: 3.3 G/DL (ref 3.4–5)
ANION GAP SERPL CALC-SCNC: 13 MMOL/L (ref 10–20)
AORTIC VALVE PEAK VELOCITY: 1 M/S
AV PEAK GRADIENT: 4 MMHG
BUN SERPL-MCNC: 23 MG/DL (ref 6–23)
CALCIUM SERPL-MCNC: 8.8 MG/DL (ref 8.6–10.3)
CHLORIDE SERPL-SCNC: 104 MMOL/L (ref 98–107)
CO2 SERPL-SCNC: 23 MMOL/L (ref 21–32)
CREAT SERPL-MCNC: 0.7 MG/DL (ref 0.5–1.3)
EGFRCR SERPLBLD CKD-EPI 2021: 89 ML/MIN/1.73M*2
EJECTION FRACTION APICAL 4 CHAMBER: 65.2
EJECTION FRACTION: 68 %
ERYTHROCYTE [DISTWIDTH] IN BLOOD BY AUTOMATED COUNT: 14.6 % (ref 11.5–14.5)
GLUCOSE SERPL-MCNC: 88 MG/DL (ref 74–99)
HCT VFR BLD AUTO: 32.1 % (ref 41–52)
HGB BLD-MCNC: 10.7 G/DL (ref 13.5–17.5)
LV EJECTION FRACTION BIPLANE: 70 %
MAGNESIUM SERPL-MCNC: 2.08 MG/DL (ref 1.6–2.4)
MCH RBC QN AUTO: 29.6 PG (ref 26–34)
MCHC RBC AUTO-ENTMCNC: 33.3 G/DL (ref 32–36)
MCV RBC AUTO: 89 FL (ref 80–100)
MITRAL VALVE E/A RATIO: 0.99
MITRAL VALVE E/E' RATIO: 10.7
NRBC BLD-RTO: 0 /100 WBCS (ref 0–0)
PHOSPHATE SERPL-MCNC: 3.7 MG/DL (ref 2.5–4.9)
PLATELET # BLD AUTO: 212 X10*3/UL (ref 150–450)
POTASSIUM SERPL-SCNC: 3.8 MMOL/L (ref 3.5–5.3)
RBC # BLD AUTO: 3.62 X10*6/UL (ref 4.5–5.9)
RIGHT VENTRICLE FREE WALL PEAK S': 8.7 CM/S
SODIUM SERPL-SCNC: 136 MMOL/L (ref 136–145)
TRICUSPID ANNULAR PLANE SYSTOLIC EXCURSION: 1.6 CM
WBC # BLD AUTO: 9.9 X10*3/UL (ref 4.4–11.3)

## 2024-09-18 PROCEDURE — 96372 THER/PROPH/DIAG INJ SC/IM: CPT

## 2024-09-18 PROCEDURE — 2500000001 HC RX 250 WO HCPCS SELF ADMINISTERED DRUGS (ALT 637 FOR MEDICARE OP)

## 2024-09-18 PROCEDURE — 85027 COMPLETE CBC AUTOMATED: CPT

## 2024-09-18 PROCEDURE — G0378 HOSPITAL OBSERVATION PER HR: HCPCS

## 2024-09-18 PROCEDURE — 97535 SELF CARE MNGMENT TRAINING: CPT | Mod: GO,CO

## 2024-09-18 PROCEDURE — 2500000002 HC RX 250 W HCPCS SELF ADMINISTERED DRUGS (ALT 637 FOR MEDICARE OP, ALT 636 FOR OP/ED)

## 2024-09-18 PROCEDURE — 80069 RENAL FUNCTION PANEL: CPT

## 2024-09-18 PROCEDURE — 96376 TX/PRO/DX INJ SAME DRUG ADON: CPT

## 2024-09-18 PROCEDURE — 99239 HOSP IP/OBS DSCHRG MGMT >30: CPT

## 2024-09-18 PROCEDURE — 70450 CT HEAD/BRAIN W/O DYE: CPT | Performed by: RADIOLOGY

## 2024-09-18 PROCEDURE — 97530 THERAPEUTIC ACTIVITIES: CPT | Mod: GO,CO

## 2024-09-18 PROCEDURE — 83735 ASSAY OF MAGNESIUM: CPT

## 2024-09-18 PROCEDURE — 2500000004 HC RX 250 GENERAL PHARMACY W/ HCPCS (ALT 636 FOR OP/ED)

## 2024-09-18 PROCEDURE — 2500000004 HC RX 250 GENERAL PHARMACY W/ HCPCS (ALT 636 FOR OP/ED): Performed by: STUDENT IN AN ORGANIZED HEALTH CARE EDUCATION/TRAINING PROGRAM

## 2024-09-18 PROCEDURE — 36415 COLL VENOUS BLD VENIPUNCTURE: CPT

## 2024-09-18 PROCEDURE — 70450 CT HEAD/BRAIN W/O DYE: CPT

## 2024-09-18 RX ORDER — BISACODYL 10 MG/1
10 SUPPOSITORY RECTAL ONCE
Status: DISCONTINUED | OUTPATIENT
Start: 2024-09-18 | End: 2024-09-18 | Stop reason: HOSPADM

## 2024-09-18 RX ORDER — SENNOSIDES 8.6 MG/1
1 TABLET ORAL 2 TIMES DAILY
Qty: 60 TABLET | Refills: 0 | Status: SHIPPED | OUTPATIENT
Start: 2024-09-18 | End: 2024-10-18

## 2024-09-18 RX ORDER — LOSARTAN POTASSIUM 50 MG/1
50 TABLET ORAL DAILY
Status: DISCONTINUED | OUTPATIENT
Start: 2024-09-18 | End: 2024-09-18 | Stop reason: HOSPADM

## 2024-09-18 RX ORDER — LOSARTAN POTASSIUM 50 MG/1
50 TABLET ORAL DAILY
Qty: 30 TABLET | Refills: 0 | Status: SHIPPED | OUTPATIENT
Start: 2024-09-18 | End: 2024-10-18

## 2024-09-18 RX ORDER — POLYETHYLENE GLYCOL 3350 17 G/17G
34 POWDER, FOR SOLUTION ORAL 2 TIMES DAILY
Qty: 120 PACKET | Refills: 0 | Status: SHIPPED | OUTPATIENT
Start: 2024-09-18 | End: 2024-10-18

## 2024-09-18 RX ORDER — BISACODYL 10 MG/1
10 SUPPOSITORY RECTAL DAILY PRN
Qty: 1 SUPPOSITORY | Refills: 0 | Status: SHIPPED | OUTPATIENT
Start: 2024-09-18

## 2024-09-18 ASSESSMENT — PAIN SCALES - GENERAL
PAINLEVEL_OUTOF10: 0 - NO PAIN
PAINLEVEL_OUTOF10: 1
PAINLEVEL_OUTOF10: 6
PAINLEVEL_OUTOF10: 5 - MODERATE PAIN

## 2024-09-18 ASSESSMENT — PAIN - FUNCTIONAL ASSESSMENT
PAIN_FUNCTIONAL_ASSESSMENT: 0-10

## 2024-09-18 ASSESSMENT — ACTIVITIES OF DAILY LIVING (ADL): HOME_MANAGEMENT_TIME_ENTRY: 15

## 2024-09-18 ASSESSMENT — COGNITIVE AND FUNCTIONAL STATUS - GENERAL
DRESSING REGULAR LOWER BODY CLOTHING: A LITTLE
PERSONAL GROOMING: A LITTLE
TOILETING: A LOT
DAILY ACTIVITIY SCORE: 18
HELP NEEDED FOR BATHING: A LITTLE
DRESSING REGULAR UPPER BODY CLOTHING: A LITTLE

## 2024-09-18 ASSESSMENT — PAIN SCALES - WONG BAKER: WONGBAKER_NUMERICALRESPONSE: HURTS LITTLE MORE

## 2024-09-18 NOTE — DISCHARGE SUMMARY
Discharge Diagnosis  Back pain due to injury    Issues Requiring Follow-Up  Constipation regimen started on discharge-Dulcolax as needed suppositories, scheduled MiraLAX 17 mg twice daily and senna 8.6 mg twice daily  PCP [id number] results for possible B12 injections, consider decreasing or stopping amlodipine given bilateral peripheral edema. BP management- stopped amlodapine due to BL LE swelling, started losartan 50mg daily    Discharge Meds     Medication List      START taking these medications     bisacodyl 10 mg suppository; Commonly known as: Dulcolax; Insert 1   suppository (10 mg) into the rectum once daily as needed for constipation.   polyethylene glycol 17 gram packet; Commonly known as: Glycolax,   Miralax; Take 34 g by mouth 2 times a day.   sennosides 8.6 mg tablet; Commonly known as: Senokot; Take 1 tablet (8.6   mg) by mouth 2 times a day.     CHANGE how you take these medications     leuprolide (6 month) 45 mg injection; Generic drug: leuprolide   (6-month); Inject 45 mg under the skin every 6 months.; What changed:   Another medication with the same name was removed. Continue taking this   medication, and follow the directions you see here.     CONTINUE taking these medications     amLODIPine 5 mg tablet; Commonly known as: Norvasc; Take 2 tablets (10   mg) by mouth once daily.   aspirin 81 mg EC tablet   hydrocortisone 2.5 % rectal cream; Commonly known as: Anusol-HC; Insert   into the rectum 2 times a day as needed for hemorrhoids (rectal   discomfort). Apply to affected areas   meclizine 25 mg tablet; Commonly known as: Antivert; TAKE 1 TABLET BY   MOUTH 3 TIMES  DAILY AS NEEDED   mirabegron 50 mg tablet extended release 24 hr 24 hr tablet; Commonly   known as: Myrbetriq; Take 1 tablet (50 mg) by mouth once daily.   ondansetron ODT 4 mg disintegrating tablet; Commonly known as:   Zofran-ODT; Take 1 tablet (4 mg) by mouth every 8 hours if needed for   nausea or vomiting for up to 7 days.    primidone 50 mg tablet; Commonly known as: Mysoline; Take 1 tablet (50   mg) by mouth once daily at bedtime.   solifenacin 10 mg tablet; Commonly known as: VESIcare; Take 1 tablet (10   mg) by mouth early in the morning..   tamsulosin 0.4 mg 24 hr capsule; Commonly known as: Flomax; Take 1   capsule (0.4 mg) by mouth once daily for 14 days.     STOP taking these medications     diclofenac 75 mg EC tablet; Commonly known as: Voltaren       Test Results Pending At Discharge  Pending Labs       Order Current Status    Extra Urine Gray Tube Collected (09/15/24 1621)    Methylmalonic Acid In process    Urinalysis with Reflex Culture and Microscopic In process            Hospital Course  Benjamin King is a 89yoM with PMHx of history of prostate cancer diagnosed in 2021 without metastases on antiandrogen therapy with negative PET CTs for surveillance of metastases, primary hypertension, CVA in 2017 who presented with worsening back pain and recurrent falls and inability to care for himself at home. Admitted for PT/OT evaluation and likely need for placement. Additionally, has a systolic murmur on physical exam. PSA checked and is downtrending from prior and on review of CT scan no evidence of pathologic lesions in the pelvis or lumbar spine and no evidence of pathologic lesions on chest x-ray. Back pain possibly related to falls and musculoskeletal injury and/or constipation. Will treat for constipation today with enemas, MiraLAX and Senokot and much improved.  His anemia was worked up, felt to be due to iron deficiency anemia and because he was severely constipated and will treat with 3 days of Venofer while inpatient.  B12 levels low normal and given his symptoms of weakness and anemia labs MMA was ordered and pending on discharge.  Will continue bowel regimen on discharge.  Throughout stay was having bilateral right more than left leg swelling, d dimer elevated but DVT duplex was negative. Low suspicion for PE given  not hypoxic and not tachycardic.  Leg swelling likely due to amlodipine 10 mg daily and was discontinued on discharge and replaced with losartan 50 mg.  Echo obtained given systolic murmur on Physical exam revealing EF normal at 65 to 70%, no wall motion abnormalities.  Surprisingly normal valves. Stepdaughter did arrive to bedside and explained that his speech was slow, likely felt to be due to undiagnosed Parkinson versus dementia.  Obtained CT head to rule out acute process and was negative.  Was referred to neurologist on discharge for further testing of unspecified dementia disorder or Parkinson's.    Follow up:  Constipation regimen started on discharge-Dulcolax as needed suppositories, scheduled MiraLAX 17 mg twice daily and senna 8.6 mg twice daily  PCP [id number] results for possible B12 injections, consider decreasing or stopping amlodipine given bilateral peripheral edema. BP management- stopped amlodapine due to BL LE swelling, started losartan 50mg daily    Pertinent Physical Exam At Time of Discharge  Physical Exam  GENERAL: Alert and oriented x 3. No acute distress. Well-nourished.  EYES: EOMI. Anicteric.  HENT: Moist mucous membranes. No scleral icterus.   LUNGS: CTA BL. No accessory muscle use.  CV: RRR. No murmur. No JVD. 3/6 systolic murmur noted, no rubs, or gallops, radial pulses +2. +1 pitting edema bilaterally, right greater than left though stable from prior exams  ABDOMEN: Soft, non-tender and non-distended. No palpable masses. BS+ x4  -EXTREMITIES: No edema. Non-tender.?  SKIN: No rashes or lesions. Warm.  NEUROLOGIC: No focal neurological deficits. CN II-XII grossly intact, but not individually tested.  PSYCHIATRIC: Cooperative. Appropriate mood and affect.    Outpatient Follow-Up  Future Appointments   Date Time Provider Department Center   12/19/2024  1:40 PM Hari Pineda MD [id number] [place]   3/11/2025  1:30 PM Wilfrid Frank MD [email or web address] [place]         Brenda Garcia DO

## 2024-09-18 NOTE — DISCHARGE INSTRUCTIONS
Please follow up with your primary care provider within 7 days for hospital follow up. Please call to make this appointment.   Our scheduling center will contact you and your stepdaughter in regards to scheduling with a neurologist for further workup of Parkinson's and possibly dementia testing.    Med changes:  START miralax 17mg BID and senna 8.6mg tablets BID until regular bowel movements  As needed dulcolax suppositories for constipation.    Please take your medications as prescribed.     If you have any new or worsening symptoms seek medical attention.    Thank you for allowing us to participate in your care!    -[facility].

## 2024-09-18 NOTE — PROGRESS NOTES
Occupational Therapy    OT Treatment    Patient Name: Benjamin King  MRN: [id number]  Today's Date: 9/18/2024  Time Calculation  Start Time: 0955  Stop Time: 1025  Time Calculation (min): 30 min       3135/3135-A    Assessment:  OT Assessment: Pt pleasant and cooperative. Pt agrees he is weaker then baseline level of function, with increased risk for falls. Recommend AR to increase safety and independence with ADL's  Prognosis: Good  End of Session Communication: Bedside nurse  End of Session Patient Position: Up in chair, Alarm on  OT Assessment Results: Decreased ADL status, Decreased upper extremity strength, Decreased endurance, Decreased functional mobility  Prognosis: Good    Plan:  Treatment Interventions: ADL retraining, Functional transfer training  OT Frequency: 3 times per week  OT Discharge Recommendations: High intensity level of continued care  Equipment Recommended upon Discharge: Wheeled walker  Treatment Interventions: ADL retraining, Functional transfer training  Subjective     Outcome Measures:[facility] Daily Activity  Putting on and taking off regular lower body clothing: A little  Bathing (including washing, rinsing, drying): A little  Putting on and taking off regular upper body clothing: A little  Toileting, which includes using toilet, bedpan or urinal: A lot  Taking care of personal grooming such as brushing teeth: A little  Eating Meals: None  Daily Activity - Total Score: 18     09/18/24 0955   OT Last Visit   OT Received On 09/18/24   General   Reason for Referral impaired ADL's   Prior to Session Communication Bedside nurse   Patient Position Received Bed, 3 rail up;Alarm on   Preferred Learning Style verbal;visual   General Comment Pt agreable to therapy   Pain Assessment   Pain Assessment 0-10   0-10 (Numeric) Pain Score 6   Pain Location Hip   Pain Orientation Right   Pain Interventions   (OT to tolerance)   Response to Interventions c/o pain when he lays flat in bed.   Cognition    Orientation Level Disoriented to time   Grooming   Grooming Level of Assistance Setup   Grooming Where Assessed Chair   Grooming Comments washed hands   LE Bathing   LE Bathing Comments min A when seated, (simulated task)   LE Dressing   LE Dressing   (min A when seated ( simulated task))   Toileting   Toileting Comments pt is using a pure wick. Max A for posterior andrzej care in stance   Bed Mobility   Bed Mobility   (SBA to EOB with HOB elevated)   Functional Mobility   Functional Mobility Performed   (Pt ambulates in room with ww support at [facility])   Transfers   Transfer   (STS at min A, Cues for correct hand positioning. bed to chair at min A with ww support hand positioning cues.)   Toilet Transfers   Toilet Transfer From Chair   Toilet Transfer Type To   Toilet Transfer to Standard bedside commode   Toilet Transfer Technique   (stepping)   Toilet Transfers Minimal assistance   IP OT Assessment   OT Assessment Pt pleasant and cooperative. Pt agrees he is weaker then baseline level of function, with increased risk for falls. Recommend AR to increase safety and independence with ADL's   Prognosis Good   End of Session Communication Bedside nurse   End of Session Patient Position Up in chair;Alarm on   OT Assessment   OT Assessment Results Decreased ADL status;Decreased upper extremity strength;Decreased endurance;Decreased functional mobility   Education   Individual(s) Educated Patient   Education Provided Ergonomics and postural realignment;Fall precautons   Patient Response to Education Patient/Caregiver Verbalized Understanding of Information   Education Comment Pt would benefit from continued reinforcement   Inpatient Plan   Treatment Interventions ADL retraining;Functional transfer training   OT Frequency 3 times per week   OT Discharge Recommendations High intensity level of continued care   Equipment Recommended upon Discharge Wheeled walker           Goals:  Encounter Problems       Encounter Problems  (Active)       OT Goals       OT Goal 1 (Progressing)       Start:  09/16/24    Expected End:  09/30/24       Pt will complete all bed mobility with modified independence safely            OT Goal 2 (Progressing)       Start:  09/16/24    Expected End:  09/30/24       Pt with complete all transfers safely with modified independence           OT Goal 3 (Progressing)       Start:  09/16/24    Expected End:  09/30/24       Pt will complete ADL's and mobility with good sit balance and fair+ stand balance            OT Goal 4 (Progressing)       Start:  09/16/24    Expected End:  09/30/24       Pt will complete LB dressing with SBA using adaptive device as needed           OT Goal 5 (Progressing)       Start:  09/16/24    Expected End:  09/30/24       Pt will complete grooming ADL's with supervision and good stand balance

## 2024-09-18 NOTE — PROGRESS NOTES
09/18/24 1104   Discharge Planning   Type of Post Acute Facility Services Skilled nursing   Expected Discharge Disposition SNF   Does the patient need discharge transport arranged? Yes   RoundTrip coordination needed? Yes   Has discharge transport been arranged? No     Updated patient and family on the status of [facility]. Precert started today. Patient confirmed he does not need a dose of Eligard till December. The last dose was on June 28, 2024.     1515: Pre cert approved for [facility].  Notified the attending of the pre cert.     1520: Patient has discharge order, step-daughter Alina notified he has pre cert and discharge order. She will transport patient to the facility. Message to dsc to submit the PASSR. Facility notified of the transport time. Message to nursing of the transport time and arrangements.

## 2024-09-18 NOTE — PROGRESS NOTES
Per request of family, provided info on [place] and their program that assists with family members becoming paid caregivers.  Family voiced understanding on how to reach SW if additional questions arise.

## 2024-09-18 NOTE — CARE PLAN
The patient's goals for the shift include      The clinical goals for the shift include Pt will remain stable        Problem: Chronic Conditions and Co-morbidities  Goal: Patient's chronic conditions and co-morbidity symptoms are monitored and maintained or improved  Outcome: Progressing

## 2024-09-21 LAB — METHYLMALONATE SERPL-SCNC: 0.21 UMOL/L (ref 0–0.4)

## 2024-10-02 LAB
ATRIAL RATE: 62 BPM
ATRIAL RATE: 63 BPM
P AXIS: 117 DEGREES
P AXIS: 79 DEGREES
P OFFSET: 129 MS
P OFFSET: 143 MS
P ONSET: 95 MS
P ONSET: 98 MS
PR INTERVAL: 234 MS
PR INTERVAL: 240 MS
Q ONSET: 215 MS
Q ONSET: 215 MS
QRS COUNT: 10 BEATS
QRS COUNT: 11 BEATS
QRS DURATION: 94 MS
QRS DURATION: 98 MS
QT INTERVAL: 446 MS
QT INTERVAL: 458 MS
QTC CALCULATION(BAZETT): 452 MS
QTC CALCULATION(BAZETT): 468 MS
QTC FREDERICIA: 451 MS
QTC FREDERICIA: 465 MS
R AXIS: 10 DEGREES
R AXIS: 9 DEGREES
T AXIS: 54 DEGREES
T AXIS: 60 DEGREES
T OFFSET: 438 MS
T OFFSET: 444 MS
VENTRICULAR RATE: 62 BPM
VENTRICULAR RATE: 63 BPM

## 2024-10-08 ENCOUNTER — DOCUMENTATION (OUTPATIENT)
Dept: PRIMARY CARE | Facility: CLINIC | Age: 89
End: 2024-10-08
Payer: MEDICARE

## 2024-10-08 ENCOUNTER — PATIENT OUTREACH (OUTPATIENT)
Dept: PRIMARY CARE | Facility: CLINIC | Age: 89
End: 2024-10-08
Payer: MEDICARE

## 2024-10-08 NOTE — PROGRESS NOTES
Discharge Facility: [facility]  Discharge Diagnosis: Malignant neoplasm of prostate, UNSPECIFIED INJURY OF LOWER BACK, SUBSEQUENT ENCOUNTER  Admission Date: 9/18/24  Discharge Date:  10/7/24    PCP Appointment Date: TBD - task to office   Specialist Appointment Date:   - Dr. Guillen (Neurology): 2/11/25  Hospital Encounter and Summary Linked: Yes (no SNF records available)  See discharge assessment below for further details    Medications  Medications reviewed with patient/caregiver?: Yes (new/changes only) (10/8/2024  2:49 PM)  Is the patient having any side effects they believe may be caused by any medication additions or changes?: No (10/8/2024  2:49 PM)  Does the patient have all medications ordered at discharge?: Yes (10/8/2024  2:49 PM)  Care Management Interventions: No intervention needed (10/8/2024  2:49 PM)  Prescription Comments: START: dulcolax, miralax, senokot, losartan  STOP:  amlodipine, diclofenac (10/8/2024  2:49 PM)  Is the patient taking all medications as directed (includes completed medication regime)?: Yes (10/8/2024  2:49 PM)  Care Management Interventions: Provided patient education (10/8/2024  2:49 PM)    Appointments  Does the patient have a primary care provider?: Yes (10/8/2024  2:49 PM)  Care Management Interventions: Advised patient to make appointment (10/8/2024  2:49 PM)    Self Management  What is the home health agency?: [facility] (10/8/2024  2:49 PM)  Has home health visited the patient within 72 hours of discharge?: Call prior to 72 hours (pending insurance approval) (10/8/2024  2:49 PM)  What Durable Medical Equipment (DME) was ordered?: walker (10/8/2024  2:49 PM)  Has all Durable Medical Equipment (DME) been delivered?: Yes (10/8/2024  2:49 PM)    Patient Teaching  Does the patient have access to their discharge instructions?: Yes (10/8/2024  2:49 PM)  Care Management Interventions: Reviewed instructions with patient (10/8/2024  2:49 PM)  What is the  patient's perception of their health status since discharge?: Improving (10/8/2024  2:49 PM)  Is the patient/caregiver able to teach back the hierarchy of who to call/visit for symptoms/problems? PCP, Specialist, Home Health nurse, Urgent Care, ED, 911: Yes (10/8/2024  2:49 PM)  Patient/Caregiver Education Comments: Spoke with patient who reports he is okay but has a sore throat today. He requests I speak with his daughter in law Alina Olvera per patient request. She reports she is now his POA and will bring documents to follow up. She reports he got home last night and he seems to be doing okay. Alina reports medications are the same as when he left the hospital. Reports they contacted their insurance company to inquire about additional services like a life alert; they are waiting a return call. Also recommended they contact the [facility] Office on Aging and they have contact info for. Denies further questions/concerns at this time. (10/8/2024  2:49 PM)

## 2024-10-09 ENCOUNTER — TELEPHONE (OUTPATIENT)
Dept: PRIMARY CARE | Facility: CLINIC | Age: 89
End: 2024-10-09
Payer: MEDICARE

## 2024-10-09 NOTE — TELEPHONE ENCOUNTER
----- Message from Maite Isela sent at 10/8/2024  3:03 PM EDT -----  Regarding: TCM appt needed  This patient was discharged from:    Discharge Facility: [facility] Diagnosis: Malignant neoplasm of prostate, UNSPECIFIED INJURY OF LOWER BACK, SUBSEQUENT ENCOUNTER  Admission Date: 9/18/24  Discharge Date:  10/7/24      No PCP appointments available within 14 days of discharge  Please reach out to patient and schedule an appointment within 7-13 days from discharge date.

## 2024-10-09 NOTE — TELEPHONE ENCOUNTER
Wilfrid Frank MD  Do [id number] Clerical1 hour ago (12:10 PM)       See if he can come in at 11:15 on October 15.  Okay to open up that day.  Thanks

## 2024-10-10 NOTE — TELEPHONE ENCOUNTER
PATIENT AWARE THAT YOU ARE OUT OF THE OFFICE AND WANTED MESSAGE LEFT FOR WHEN YOU RETURN.        FYI..PATIENTS CAREGIVER CALLING BACK - RIKKI IS USING THE RESTROOM FREQUENTLY, BLOOD IN URINE, BLOOD PRESSURE IS HIGH, VERY UNSTABLE ON HIS FEET - THEY ARE GOING TO TAKE HIM BACK TO THE ER -

## 2024-10-10 NOTE — TELEPHONE ENCOUNTER
Wilfrid Frank MD  Do [id number] [id number] hours ago (12:10 PM)       See if he can come in at 11:15 on October 15.  Okay to open up that day.  Thanks

## 2024-10-29 DIAGNOSIS — C61 PROSTATE CANCER (MULTI): ICD-10-CM

## 2024-10-30 RX ORDER — LEUPROLIDE ACETATE 45 MG
45 KIT INTRAMUSCULAR
Qty: 1 EACH | Refills: 1 | Status: SHIPPED | OUTPATIENT
Start: 2024-10-30 | End: 2024-11-01

## 2024-12-03 ENCOUNTER — APPOINTMENT (OUTPATIENT)
Dept: NEUROLOGY | Facility: CLINIC | Age: 89
End: 2024-12-03
Payer: MEDICARE

## 2024-12-19 ENCOUNTER — APPOINTMENT (OUTPATIENT)
Dept: UROLOGY | Facility: CLINIC | Age: 89
End: 2024-12-19
Payer: MEDICARE

## 2025-02-11 ENCOUNTER — APPOINTMENT (OUTPATIENT)
Dept: NEUROLOGY | Facility: CLINIC | Age: 89
End: 2025-02-11
Payer: MEDICARE

## 2025-03-11 ENCOUNTER — APPOINTMENT (OUTPATIENT)
Dept: PRIMARY CARE | Facility: CLINIC | Age: 89
End: 2025-03-11
Payer: MEDICARE